# Patient Record
Sex: FEMALE | Race: WHITE | NOT HISPANIC OR LATINO | Employment: OTHER | URBAN - METROPOLITAN AREA
[De-identification: names, ages, dates, MRNs, and addresses within clinical notes are randomized per-mention and may not be internally consistent; named-entity substitution may affect disease eponyms.]

---

## 2018-06-21 ENCOUNTER — OFFICE VISIT CONVERTED (OUTPATIENT)
Dept: FAMILY MEDICINE CLINIC | Facility: CLINIC | Age: 83
End: 2018-06-21
Attending: NURSE PRACTITIONER

## 2018-07-09 ENCOUNTER — CONVERSION ENCOUNTER (OUTPATIENT)
Dept: OTHER | Facility: HOSPITAL | Age: 83
End: 2018-07-09

## 2018-10-23 ENCOUNTER — OFFICE VISIT CONVERTED (OUTPATIENT)
Dept: FAMILY MEDICINE CLINIC | Facility: CLINIC | Age: 83
End: 2018-10-23
Attending: NURSE PRACTITIONER

## 2018-12-13 ENCOUNTER — OFFICE VISIT CONVERTED (OUTPATIENT)
Dept: FAMILY MEDICINE CLINIC | Facility: CLINIC | Age: 83
End: 2018-12-13
Attending: NURSE PRACTITIONER

## 2019-07-02 ENCOUNTER — CONVERSION ENCOUNTER (OUTPATIENT)
Dept: FAMILY MEDICINE CLINIC | Facility: CLINIC | Age: 84
End: 2019-07-02

## 2019-07-02 ENCOUNTER — OFFICE VISIT CONVERTED (OUTPATIENT)
Dept: FAMILY MEDICINE CLINIC | Facility: CLINIC | Age: 84
End: 2019-07-02
Attending: NURSE PRACTITIONER

## 2019-07-09 ENCOUNTER — OFFICE VISIT CONVERTED (OUTPATIENT)
Dept: ORTHOPEDIC SURGERY | Facility: CLINIC | Age: 84
End: 2019-07-09
Attending: ORTHOPAEDIC SURGERY

## 2019-07-18 ENCOUNTER — HOSPITAL ENCOUNTER (OUTPATIENT)
Dept: OTHER | Facility: HOSPITAL | Age: 84
Discharge: HOME | End: 2019-07-18
Attending: NURSE PRACTITIONER

## 2019-07-18 LAB
ALBUMIN SERPL-MCNC: 3.7 G/DL (ref 3.5–5)
ALBUMIN/GLOB SERPL: 1.1 {RATIO} (ref 1.4–2.6)
ALP SERPL-CCNC: 177 U/L (ref 43–160)
ALT SERPL-CCNC: 17 U/L (ref 10–40)
ANION GAP SERPL CALC-SCNC: 16 MMOL/L (ref 8–19)
AST SERPL-CCNC: 24 U/L (ref 15–50)
BILIRUB SERPL-MCNC: 0.39 MG/DL (ref 0.2–1.3)
BUN SERPL-MCNC: 16 MG/DL (ref 5–25)
BUN/CREAT SERPL: 23 {RATIO} (ref 6–20)
CALCIUM SERPL-MCNC: 9.2 MG/DL (ref 8.7–10.4)
CHLORIDE SERPL-SCNC: 106 MMOL/L (ref 99–111)
CONV CO2: 30 MMOL/L (ref 22–32)
CONV TOTAL PROTEIN: 7.2 G/DL (ref 6.3–8.2)
CREAT UR-MCNC: 0.69 MG/DL (ref 0.5–0.9)
GFR SERPLBLD BASED ON 1.73 SQ M-ARVRAT: >60 ML/MIN/{1.73_M2}
GLOBULIN UR ELPH-MCNC: 3.5 G/DL (ref 2–3.5)
GLUCOSE SERPL-MCNC: 105 MG/DL (ref 65–99)
OSMOLALITY SERPL CALC.SUM OF ELEC: 310 MOSM/KG (ref 273–304)
POTASSIUM SERPL-SCNC: 3.4 MMOL/L (ref 3.5–5.3)
SODIUM SERPL-SCNC: 149 MMOL/L (ref 135–147)

## 2019-07-24 ENCOUNTER — HOSPITAL ENCOUNTER (OUTPATIENT)
Dept: FAMILY MEDICINE CLINIC | Facility: CLINIC | Age: 84
Discharge: HOME | End: 2019-07-24
Attending: NURSE PRACTITIONER

## 2019-07-24 LAB
ALBUMIN SERPL-MCNC: 3.9 G/DL (ref 3.5–5)
ALBUMIN/GLOB SERPL: 1.1 {RATIO} (ref 1.4–2.6)
ALP SERPL-CCNC: 164 U/L (ref 43–160)
ALT SERPL-CCNC: 18 U/L (ref 10–40)
ANION GAP SERPL CALC-SCNC: 15 MMOL/L (ref 8–19)
AST SERPL-CCNC: 27 U/L (ref 15–50)
BILIRUB SERPL-MCNC: 0.27 MG/DL (ref 0.2–1.3)
BUN SERPL-MCNC: 15 MG/DL (ref 5–25)
BUN/CREAT SERPL: 22 {RATIO} (ref 6–20)
CALCIUM SERPL-MCNC: 9.4 MG/DL (ref 8.7–10.4)
CHLORIDE SERPL-SCNC: 102 MMOL/L (ref 99–111)
CONV CO2: 31 MMOL/L (ref 22–32)
CONV TOTAL PROTEIN: 7.3 G/DL (ref 6.3–8.2)
CREAT UR-MCNC: 0.69 MG/DL (ref 0.5–0.9)
GFR SERPLBLD BASED ON 1.73 SQ M-ARVRAT: >60 ML/MIN/{1.73_M2}
GLOBULIN UR ELPH-MCNC: 3.4 G/DL (ref 2–3.5)
GLUCOSE SERPL-MCNC: 133 MG/DL (ref 65–99)
OSMOLALITY SERPL CALC.SUM OF ELEC: 301 MOSM/KG (ref 273–304)
POTASSIUM SERPL-SCNC: 3.6 MMOL/L (ref 3.5–5.3)
SODIUM SERPL-SCNC: 144 MMOL/L (ref 135–147)

## 2019-08-06 ENCOUNTER — HOSPITAL ENCOUNTER (OUTPATIENT)
Dept: OTHER | Facility: HOSPITAL | Age: 84
Discharge: HOME | End: 2019-08-06
Attending: FAMILY MEDICINE

## 2019-08-06 LAB
APPEARANCE UR: ABNORMAL
BILIRUB UR QL: NEGATIVE
CASTS URNS QL MICRO: ABNORMAL /[LPF]
COLOR UR: YELLOW
CONV BACTERIA: ABNORMAL
CONV COLLECTION SOURCE (UA): ABNORMAL
CONV CRYSTALS: ABNORMAL /[HPF]
CONV HYALINE CASTS IN URINE MICRO: ABNORMAL /[LPF]
CONV UROBILINOGEN IN URINE BY AUTOMATED TEST STRIP: 0.2 {EHRLICHU}/DL (ref 0.1–1)
GLUCOSE UR QL: NEGATIVE MG/DL
HGB UR QL STRIP: ABNORMAL
KETONES UR QL STRIP: NEGATIVE MG/DL
LEUKOCYTE ESTERASE UR QL STRIP: ABNORMAL
NITRITE UR QL STRIP: NEGATIVE
PH UR STRIP.AUTO: 5.5 [PH] (ref 5–8)
PROT UR QL: ABNORMAL MG/DL
RBC #/AREA URNS HPF: ABNORMAL /[HPF]
SP GR UR: 1.02 (ref 1–1.03)
SQUAMOUS SPT QL MICRO: ABNORMAL /[HPF]
WBC #/AREA URNS HPF: ABNORMAL /[HPF]

## 2019-08-08 LAB
AMOXICILLIN+CLAV SUSC ISLT: <=2
AMPICILLIN SUSC ISLT: 4
AMPICILLIN+SULBAC SUSC ISLT: <=2
BACTERIA UR CULT: ABNORMAL
CEFAZOLIN SUSC ISLT: <=4
CEFEPIME SUSC ISLT: <=1
CEFTAZIDIME SUSC ISLT: <=1
CEFTRIAXONE SUSC ISLT: <=1
CEFUROXIME ORAL SUSC ISLT: 4
CEFUROXIME PARENTER SUSC ISLT: 4
CIPROFLOXACIN SUSC ISLT: <=0.25
ERTAPENEM SUSC ISLT: <=0.5
GENTAMICIN SUSC ISLT: <=1
LEVOFLOXACIN SUSC ISLT: <=0.12
NITROFURANTOIN SUSC ISLT: <=16
TETRACYCLINE SUSC ISLT: <=1
TMP SMX SUSC ISLT: <=20
TOBRAMYCIN SUSC ISLT: <=1

## 2019-08-26 ENCOUNTER — HOSPITAL ENCOUNTER (OUTPATIENT)
Dept: FAMILY MEDICINE CLINIC | Facility: CLINIC | Age: 84
Discharge: HOME | End: 2019-08-26
Attending: NURSE PRACTITIONER

## 2019-08-26 ENCOUNTER — OFFICE VISIT CONVERTED (OUTPATIENT)
Dept: FAMILY MEDICINE CLINIC | Facility: CLINIC | Age: 84
End: 2019-08-26
Attending: NURSE PRACTITIONER

## 2019-08-28 LAB
AMOXICILLIN+CLAV SUSC ISLT: 4
AMPICILLIN SUSC ISLT: 8
AMPICILLIN+SULBAC SUSC ISLT: 4
BACTERIA UR CULT: ABNORMAL
CEFAZOLIN SUSC ISLT: >=64
CEFEPIME SUSC ISLT: <=1
CEFTAZIDIME SUSC ISLT: <=1
CEFTRIAXONE SUSC ISLT: <=1
CEFUROXIME ORAL SUSC ISLT: 4
CEFUROXIME PARENTER SUSC ISLT: 4
CIPROFLOXACIN SUSC ISLT: <=0.25
ERTAPENEM SUSC ISLT: <=0.5
GENTAMICIN SUSC ISLT: <=1
LEVOFLOXACIN SUSC ISLT: 0.5
NITROFURANTOIN SUSC ISLT: 32
TETRACYCLINE SUSC ISLT: >=16
TMP SMX SUSC ISLT: <=20
TOBRAMYCIN SUSC ISLT: <=1

## 2019-09-16 ENCOUNTER — HOSPITAL ENCOUNTER (OUTPATIENT)
Dept: FAMILY MEDICINE CLINIC | Facility: CLINIC | Age: 84
Discharge: HOME | End: 2019-09-16
Attending: NURSE PRACTITIONER

## 2019-09-18 LAB — BACTERIA UR CULT: NORMAL

## 2019-10-11 ENCOUNTER — HOSPITAL ENCOUNTER (OUTPATIENT)
Dept: FAMILY MEDICINE CLINIC | Facility: CLINIC | Age: 84
Discharge: HOME | End: 2019-10-11
Attending: NURSE PRACTITIONER

## 2019-10-13 LAB
AMOXICILLIN+CLAV SUSC ISLT: 4
AMOXICILLIN+CLAV SUSC ISLT: 8
AMPICILLIN SUSC ISLT: 16
AMPICILLIN+SULBAC SUSC ISLT: 8
BACTERIA UR CULT: ABNORMAL
CEFAZOLIN SUSC ISLT: <=4
CEFAZOLIN SUSC ISLT: >=64
CEFEPIME SUSC ISLT: <=1
CEFEPIME SUSC ISLT: <=1
CEFTAZIDIME SUSC ISLT: <=1
CEFTAZIDIME SUSC ISLT: <=1
CEFTRIAXONE SUSC ISLT: <=1
CEFTRIAXONE SUSC ISLT: <=1
CEFUROXIME ORAL SUSC ISLT: 2
CEFUROXIME ORAL SUSC ISLT: 4
CEFUROXIME PARENTER SUSC ISLT: 2
CEFUROXIME PARENTER SUSC ISLT: 4
CIPROFLOXACIN SUSC ISLT: <=0.25
CIPROFLOXACIN SUSC ISLT: <=0.25
ERTAPENEM SUSC ISLT: <=0.5
ERTAPENEM SUSC ISLT: <=0.5
GENTAMICIN SUSC ISLT: <=1
GENTAMICIN SUSC ISLT: <=1
LEVOFLOXACIN SUSC ISLT: <=0.12
LEVOFLOXACIN SUSC ISLT: <=0.12
NITROFURANTOIN SUSC ISLT: 32
NITROFURANTOIN SUSC ISLT: 32
TETRACYCLINE SUSC ISLT: <=1
TETRACYCLINE SUSC ISLT: <=1
TMP SMX SUSC ISLT: <=20
TMP SMX SUSC ISLT: <=20
TOBRAMYCIN SUSC ISLT: <=1
TOBRAMYCIN SUSC ISLT: <=1

## 2019-10-25 ENCOUNTER — OFFICE VISIT CONVERTED (OUTPATIENT)
Dept: ORTHOPEDIC SURGERY | Facility: CLINIC | Age: 84
End: 2019-10-25
Attending: ORTHOPAEDIC SURGERY

## 2019-12-05 ENCOUNTER — HOSPITAL ENCOUNTER (OUTPATIENT)
Dept: OTHER | Facility: HOSPITAL | Age: 84
Discharge: HOME | End: 2019-12-05
Attending: ORTHOPAEDIC SURGERY

## 2019-12-05 LAB
APPEARANCE UR: ABNORMAL
BILIRUB UR QL: NEGATIVE
CASTS URNS QL MICRO: ABNORMAL /[LPF]
COLOR UR: YELLOW
CONV BACTERIA: NEGATIVE
CONV COLLECTION SOURCE (UA): ABNORMAL
CONV CRYSTALS: ABNORMAL /[HPF]
CONV UROBILINOGEN IN URINE BY AUTOMATED TEST STRIP: 1 {EHRLICHU}/DL (ref 0.1–1)
GLUCOSE UR QL: NEGATIVE MG/DL
HGB UR QL STRIP: ABNORMAL
KETONES UR QL STRIP: NEGATIVE MG/DL
LEUKOCYTE ESTERASE UR QL STRIP: ABNORMAL
NITRITE UR QL STRIP: NEGATIVE
PH UR STRIP.AUTO: 5.5 [PH] (ref 5–8)
PROT UR QL: 30 MG/DL
RBC #/AREA URNS HPF: ABNORMAL /[HPF]
SP GR UR: 1.03 (ref 1–1.03)
SQUAMOUS SPT QL MICRO: ABNORMAL /[HPF]
WBC #/AREA URNS HPF: ABNORMAL /[HPF]

## 2019-12-07 LAB — BACTERIA UR CULT: NORMAL

## 2020-01-02 ENCOUNTER — HOSPITAL ENCOUNTER (OUTPATIENT)
Dept: OTHER | Facility: HOSPITAL | Age: 85
Discharge: HOME | End: 2020-01-02
Attending: FAMILY MEDICINE

## 2020-01-02 LAB
APPEARANCE UR: ABNORMAL
BILIRUB UR QL: NEGATIVE
CASTS URNS QL MICRO: ABNORMAL /[LPF]
COLOR UR: YELLOW
CONV BACTERIA: NEGATIVE
CONV COLLECTION SOURCE (UA): ABNORMAL
CONV CRYSTALS: ABNORMAL /[HPF]
CONV UROBILINOGEN IN URINE BY AUTOMATED TEST STRIP: 0.2 {EHRLICHU}/DL (ref 0.1–1)
GLUCOSE UR QL: NEGATIVE MG/DL
HGB UR QL STRIP: ABNORMAL
KETONES UR QL STRIP: NEGATIVE MG/DL
LEUKOCYTE ESTERASE UR QL STRIP: ABNORMAL
NITRITE UR QL STRIP: NEGATIVE
PH UR STRIP.AUTO: 5 [PH] (ref 5–8)
PROT UR QL: ABNORMAL MG/DL
RBC #/AREA URNS HPF: ABNORMAL /[HPF]
SP GR UR: 1.02 (ref 1–1.03)
SQUAMOUS SPT QL MICRO: ABNORMAL /[HPF]
WBC #/AREA URNS HPF: ABNORMAL /[HPF]

## 2020-01-04 LAB — BACTERIA UR CULT: NORMAL

## 2020-01-06 ENCOUNTER — HOSPITAL ENCOUNTER (OUTPATIENT)
Dept: FAMILY MEDICINE CLINIC | Facility: CLINIC | Age: 85
Discharge: HOME | End: 2020-01-06
Attending: NURSE PRACTITIONER

## 2020-01-06 LAB
ALBUMIN SERPL-MCNC: 4.2 G/DL (ref 3.5–5)
ALBUMIN/GLOB SERPL: 1.3 {RATIO} (ref 1.4–2.6)
ALP SERPL-CCNC: 100 U/L (ref 43–160)
ALT SERPL-CCNC: 13 U/L (ref 10–40)
ANION GAP SERPL CALC-SCNC: 18 MMOL/L (ref 8–19)
AST SERPL-CCNC: 24 U/L (ref 15–50)
BASOPHILS # BLD AUTO: 0.02 10*3/UL (ref 0–0.2)
BASOPHILS NFR BLD AUTO: 0.4 % (ref 0–3)
BILIRUB SERPL-MCNC: 0.42 MG/DL (ref 0.2–1.3)
BUN SERPL-MCNC: 18 MG/DL (ref 5–25)
BUN/CREAT SERPL: 22 {RATIO} (ref 6–20)
CALCIUM SERPL-MCNC: 9.2 MG/DL (ref 8.7–10.4)
CHLORIDE SERPL-SCNC: 103 MMOL/L (ref 99–111)
CHOLEST SERPL-MCNC: 197 MG/DL (ref 107–200)
CHOLEST/HDLC SERPL: 3.9 {RATIO} (ref 3–6)
CONV ABS IMM GRAN: 0.02 10*3/UL (ref 0–0.2)
CONV CO2: 27 MMOL/L (ref 22–32)
CONV IMMATURE GRAN: 0.4 % (ref 0–1.8)
CONV TOTAL PROTEIN: 7.4 G/DL (ref 6.3–8.2)
CREAT UR-MCNC: 0.82 MG/DL (ref 0.5–0.9)
DEPRECATED RDW RBC AUTO: 49.2 FL (ref 36.4–46.3)
EOSINOPHIL # BLD AUTO: 0.14 10*3/UL (ref 0–0.7)
EOSINOPHIL # BLD AUTO: 2.5 % (ref 0–7)
ERYTHROCYTE [DISTWIDTH] IN BLOOD BY AUTOMATED COUNT: 14.1 % (ref 11.7–14.4)
GFR SERPLBLD BASED ON 1.73 SQ M-ARVRAT: >60 ML/MIN/{1.73_M2}
GLOBULIN UR ELPH-MCNC: 3.2 G/DL (ref 2–3.5)
GLUCOSE SERPL-MCNC: 96 MG/DL (ref 65–99)
HCT VFR BLD AUTO: 39.4 % (ref 37–47)
HDLC SERPL-MCNC: 51 MG/DL (ref 40–60)
HGB BLD-MCNC: 12.5 G/DL (ref 12–16)
LDLC SERPL CALC-MCNC: 129 MG/DL (ref 70–100)
LYMPHOCYTES # BLD AUTO: 1.22 10*3/UL (ref 1–5)
LYMPHOCYTES NFR BLD AUTO: 21.7 % (ref 20–45)
MCH RBC QN AUTO: 30.2 PG (ref 27–31)
MCHC RBC AUTO-ENTMCNC: 31.7 G/DL (ref 33–37)
MCV RBC AUTO: 95.2 FL (ref 81–99)
MONOCYTES # BLD AUTO: 0.57 10*3/UL (ref 0.2–1.2)
MONOCYTES NFR BLD AUTO: 10.1 % (ref 3–10)
NEUTROPHILS # BLD AUTO: 3.66 10*3/UL (ref 2–8)
NEUTROPHILS NFR BLD AUTO: 64.9 % (ref 30–85)
NRBC CBCN: 0 % (ref 0–0.7)
OSMOLALITY SERPL CALC.SUM OF ELEC: 300 MOSM/KG (ref 273–304)
PLATELET # BLD AUTO: 154 10*3/UL (ref 130–400)
PMV BLD AUTO: 10.9 FL (ref 9.4–12.3)
POTASSIUM SERPL-SCNC: 4 MMOL/L (ref 3.5–5.3)
RBC # BLD AUTO: 4.14 10*6/UL (ref 4.2–5.4)
SODIUM SERPL-SCNC: 144 MMOL/L (ref 135–147)
TRIGL SERPL-MCNC: 87 MG/DL (ref 40–150)
VLDLC SERPL-MCNC: 17 MG/DL (ref 5–37)
WBC # BLD AUTO: 5.63 10*3/UL (ref 4.8–10.8)

## 2020-01-13 ENCOUNTER — OFFICE VISIT CONVERTED (OUTPATIENT)
Dept: FAMILY MEDICINE CLINIC | Facility: CLINIC | Age: 85
End: 2020-01-13
Attending: NURSE PRACTITIONER

## 2020-03-25 ENCOUNTER — PATIENT OUTREACH - CONVERTED (OUTPATIENT)
Dept: FAMILY MEDICINE CLINIC | Facility: CLINIC | Age: 85
End: 2020-03-25
Attending: NURSE PRACTITIONER

## 2020-04-27 ENCOUNTER — HOSPITAL ENCOUNTER (OUTPATIENT)
Dept: FAMILY MEDICINE CLINIC | Facility: CLINIC | Age: 85
Discharge: HOME | End: 2020-04-27
Attending: NURSE PRACTITIONER

## 2020-04-29 ENCOUNTER — PATIENT OUTREACH - CONVERTED (OUTPATIENT)
Dept: FAMILY MEDICINE CLINIC | Facility: CLINIC | Age: 85
End: 2020-04-29
Attending: NURSE PRACTITIONER

## 2020-04-30 LAB
AMOXICILLIN+CLAV SUSC ISLT: <=2
AMPICILLIN SUSC ISLT: 16
AMPICILLIN SUSC ISLT: <=2
AMPICILLIN+SULBAC SUSC ISLT: <=2
BACTERIA UR CULT: ABNORMAL
CEFAZOLIN SUSC ISLT: <=4
CEFEPIME SUSC ISLT: <=1
CEFTAZIDIME SUSC ISLT: <=1
CEFTRIAXONE SUSC ISLT: <=1
CEFUROXIME ORAL SUSC ISLT: <=1
CEFUROXIME PARENTER SUSC ISLT: <=1
CIPROFLOXACIN SUSC ISLT: <=0.25
CIPROFLOXACIN SUSC ISLT: <=0.5
CONV GENTAMICIN HIGH LEVEL SYNERGY: ABNORMAL
CONV STREPTOMYCIN HIGH LEVEL SYNERGY: ABNORMAL
DOXYCYCLINE SUSC ISLT: <=0.5
ERTAPENEM SUSC ISLT: <=0.5
ERYTHROMYCIN SUSC ISLT: <=0.25
GENTAMICIN SUSC ISLT: <=1
LEVOFLOXACIN SUSC ISLT: 2
LEVOFLOXACIN SUSC ISLT: <=0.12
LINEZOLID SUSC ISLT: 2
NITROFURANTOIN SUSC ISLT: 64
NITROFURANTOIN SUSC ISLT: <=16
TETRACYCLINE SUSC ISLT: <=1
TETRACYCLINE SUSC ISLT: <=1
TMP SMX SUSC ISLT: <=20
TOBRAMYCIN SUSC ISLT: <=1
VANCOMYCIN SUSC ISLT: <=0.5

## 2020-06-29 ENCOUNTER — PATIENT OUTREACH - CONVERTED (OUTPATIENT)
Dept: FAMILY MEDICINE CLINIC | Facility: CLINIC | Age: 85
End: 2020-06-29
Attending: NURSE PRACTITIONER

## 2020-08-26 ENCOUNTER — PATIENT OUTREACH - CONVERTED (OUTPATIENT)
Dept: FAMILY MEDICINE CLINIC | Facility: CLINIC | Age: 85
End: 2020-08-26
Attending: NURSE PRACTITIONER

## 2020-09-01 ENCOUNTER — HOSPITAL ENCOUNTER (OUTPATIENT)
Dept: FAMILY MEDICINE CLINIC | Facility: CLINIC | Age: 85
Discharge: HOME | End: 2020-09-01
Attending: NURSE PRACTITIONER

## 2020-09-01 ENCOUNTER — CONVERSION ENCOUNTER (OUTPATIENT)
Dept: FAMILY MEDICINE CLINIC | Facility: CLINIC | Age: 85
End: 2020-09-01

## 2020-09-01 ENCOUNTER — OFFICE VISIT CONVERTED (OUTPATIENT)
Dept: FAMILY MEDICINE CLINIC | Facility: CLINIC | Age: 85
End: 2020-09-01
Attending: NURSE PRACTITIONER

## 2020-09-02 LAB
ALBUMIN SERPL-MCNC: 4.1 G/DL (ref 3.5–5)
ALBUMIN/GLOB SERPL: 1.2 {RATIO} (ref 1.4–2.6)
ALP SERPL-CCNC: 111 U/L (ref 43–160)
ALT SERPL-CCNC: 12 U/L (ref 10–40)
ANION GAP SERPL CALC-SCNC: 15 MMOL/L (ref 8–19)
AST SERPL-CCNC: 23 U/L (ref 15–50)
BASOPHILS # BLD AUTO: 0.02 10*3/UL (ref 0–0.2)
BASOPHILS NFR BLD AUTO: 0.2 % (ref 0–3)
BILIRUB SERPL-MCNC: 0.28 MG/DL (ref 0.2–1.3)
BUN SERPL-MCNC: 17 MG/DL (ref 5–25)
BUN/CREAT SERPL: 20 {RATIO} (ref 6–20)
CALCIUM SERPL-MCNC: 9.8 MG/DL (ref 8.7–10.4)
CHLORIDE SERPL-SCNC: 103 MMOL/L (ref 99–111)
CHOLEST SERPL-MCNC: 206 MG/DL (ref 107–200)
CHOLEST/HDLC SERPL: 4.2 {RATIO} (ref 3–6)
CONV ABS IMM GRAN: 0.03 10*3/UL (ref 0–0.2)
CONV CO2: 27 MMOL/L (ref 22–32)
CONV IMMATURE GRAN: 0.4 % (ref 0–1.8)
CONV TOTAL PROTEIN: 7.4 G/DL (ref 6.3–8.2)
CREAT UR-MCNC: 0.84 MG/DL (ref 0.5–0.9)
DEPRECATED RDW RBC AUTO: 47.1 FL (ref 36.4–46.3)
EOSINOPHIL # BLD AUTO: 0.32 10*3/UL (ref 0–0.7)
EOSINOPHIL # BLD AUTO: 3.7 % (ref 0–7)
ERYTHROCYTE [DISTWIDTH] IN BLOOD BY AUTOMATED COUNT: 13.7 % (ref 11.7–14.4)
GFR SERPLBLD BASED ON 1.73 SQ M-ARVRAT: >60 ML/MIN/{1.73_M2}
GLOBULIN UR ELPH-MCNC: 3.3 G/DL (ref 2–3.5)
GLUCOSE SERPL-MCNC: 78 MG/DL (ref 65–99)
HCT VFR BLD AUTO: 42.4 % (ref 37–47)
HDLC SERPL-MCNC: 49 MG/DL (ref 40–60)
HGB BLD-MCNC: 13.4 G/DL (ref 12–16)
LDLC SERPL CALC-MCNC: 121 MG/DL (ref 70–100)
LYMPHOCYTES # BLD AUTO: 2.11 10*3/UL (ref 1–5)
LYMPHOCYTES NFR BLD AUTO: 24.6 % (ref 20–45)
MCH RBC QN AUTO: 30 PG (ref 27–31)
MCHC RBC AUTO-ENTMCNC: 31.6 G/DL (ref 33–37)
MCV RBC AUTO: 95.1 FL (ref 81–99)
MONOCYTES # BLD AUTO: 0.89 10*3/UL (ref 0.2–1.2)
MONOCYTES NFR BLD AUTO: 10.4 % (ref 3–10)
NEUTROPHILS # BLD AUTO: 5.2 10*3/UL (ref 2–8)
NEUTROPHILS NFR BLD AUTO: 60.7 % (ref 30–85)
NRBC CBCN: 0 % (ref 0–0.7)
OSMOLALITY SERPL CALC.SUM OF ELEC: 292 MOSM/KG (ref 273–304)
PLATELET # BLD AUTO: 187 10*3/UL (ref 130–400)
PMV BLD AUTO: 10.2 FL (ref 9.4–12.3)
POTASSIUM SERPL-SCNC: 3.7 MMOL/L (ref 3.5–5.3)
RBC # BLD AUTO: 4.46 10*6/UL (ref 4.2–5.4)
SODIUM SERPL-SCNC: 141 MMOL/L (ref 135–147)
TRIGL SERPL-MCNC: 179 MG/DL (ref 40–150)
TSH SERPL-ACNC: 1.79 M[IU]/L (ref 0.27–4.2)
VLDLC SERPL-MCNC: 36 MG/DL (ref 5–37)
WBC # BLD AUTO: 8.57 10*3/UL (ref 4.8–10.8)

## 2020-09-03 LAB
AMPICILLIN SUSC ISLT: 4
AMPICILLIN+SULBAC SUSC ISLT: <=2
BACTERIA UR CULT: ABNORMAL
CEFAZOLIN SUSC ISLT: <=4
CEFEPIME SUSC ISLT: <=0.12
CEFTAZIDIME SUSC ISLT: <=1
CEFTRIAXONE SUSC ISLT: <=0.25
CIPROFLOXACIN SUSC ISLT: <=0.25
ERTAPENEM SUSC ISLT: <=0.12
GENTAMICIN SUSC ISLT: <=1
LEVOFLOXACIN SUSC ISLT: <=0.12
NITROFURANTOIN SUSC ISLT: <=16
PIP+TAZO SUSC ISLT: <=4
TMP SMX SUSC ISLT: <=20
TOBRAMYCIN SUSC ISLT: <=1

## 2020-09-15 ENCOUNTER — HOSPITAL ENCOUNTER (OUTPATIENT)
Dept: FAMILY MEDICINE CLINIC | Facility: CLINIC | Age: 85
Discharge: HOME | End: 2020-09-15
Attending: NURSE PRACTITIONER

## 2020-09-17 LAB
AMPICILLIN SUSC ISLT: <=2
AMPICILLIN SUSC ISLT: >=32
AMPICILLIN+SULBAC SUSC ISLT: >=32
BACTERIA UR CULT: ABNORMAL
CEFAZOLIN SUSC ISLT: <=4
CEFEPIME SUSC ISLT: <=0.12
CEFTAZIDIME SUSC ISLT: <=1
CEFTRIAXONE SUSC ISLT: <=0.25
CIPROFLOXACIN SUSC ISLT: <=0.25
CIPROFLOXACIN SUSC ISLT: >=8
CONV GENTAMICIN HIGH LEVEL SYNERGY: ABNORMAL
CONV STREPTOMYCIN HIGH LEVEL SYNERGY: ABNORMAL
DAPTOMYCIN SUSC ISLT: 4
DOXYCYCLINE SUSC ISLT: >=16
ERTAPENEM SUSC ISLT: <=0.12
ERYTHROMYCIN SUSC ISLT: >=8
GENTAMICIN SUSC ISLT: <=1
LEVOFLOXACIN SUSC ISLT: <=0.12
LEVOFLOXACIN SUSC ISLT: >=8
LINEZOLID SUSC ISLT: 2
NITROFURANTOIN SUSC ISLT: 64
NITROFURANTOIN SUSC ISLT: <=16
PIP+TAZO SUSC ISLT: <=4
TETRACYCLINE SUSC ISLT: >=16
TMP SMX SUSC ISLT: >=320
TOBRAMYCIN SUSC ISLT: <=1
VANCOMYCIN SUSC ISLT: >=32

## 2020-09-25 ENCOUNTER — HOSPITAL ENCOUNTER (OUTPATIENT)
Dept: GENERAL RADIOLOGY | Facility: HOSPITAL | Age: 85
Discharge: HOME | End: 2020-09-25
Attending: NURSE PRACTITIONER

## 2020-09-28 ENCOUNTER — PATIENT OUTREACH - CONVERTED (OUTPATIENT)
Dept: FAMILY MEDICINE CLINIC | Facility: CLINIC | Age: 85
End: 2020-09-28
Attending: NURSE PRACTITIONER

## 2020-09-28 LAB
AMPICILLIN SUSC ISLT: <=2
AMPICILLIN SUSC ISLT: >=32
AMPICILLIN+SULBAC SUSC ISLT: >=32
BACTERIA UR CULT: ABNORMAL
CEFAZOLIN SUSC ISLT: <=4
CEFEPIME SUSC ISLT: <=0.12
CEFTAZIDIME SUSC ISLT: <=1
CEFTRIAXONE SUSC ISLT: <=0.25
CIPROFLOXACIN SUSC ISLT: 2
CIPROFLOXACIN SUSC ISLT: <=0.25
CONV GENTAMICIN HIGH LEVEL SYNERGY: ABNORMAL
CONV STREPTOMYCIN HIGH LEVEL SYNERGY: ABNORMAL
DOXYCYCLINE SUSC ISLT: <=0.5
ERTAPENEM SUSC ISLT: <=0.12
ERYTHROMYCIN SUSC ISLT: >=8
GENTAMICIN SUSC ISLT: <=1
LEVOFLOXACIN SUSC ISLT: 2
LEVOFLOXACIN SUSC ISLT: <=0.12
LINEZOLID SUSC ISLT: 2
NITROFURANTOIN SUSC ISLT: 128
NITROFURANTOIN SUSC ISLT: 64
PIP+TAZO SUSC ISLT: <=4
TETRACYCLINE SUSC ISLT: <=1
TMP SMX SUSC ISLT: >=320
TOBRAMYCIN SUSC ISLT: <=1
VANCOMYCIN SUSC ISLT: <=0.5

## 2020-10-16 ENCOUNTER — HOSPITAL ENCOUNTER (OUTPATIENT)
Dept: GENERAL RADIOLOGY | Facility: HOSPITAL | Age: 85
Discharge: HOME | End: 2020-10-16
Attending: NURSE PRACTITIONER

## 2020-10-19 LAB
AMPICILLIN SUSC ISLT: <=2
BACTERIA UR CULT: ABNORMAL
CIPROFLOXACIN SUSC ISLT: >=8
CONV GENTAMICIN HIGH LEVEL SYNERGY: ABNORMAL
CONV STREPTOMYCIN HIGH LEVEL SYNERGY: ABNORMAL
DAPTOMYCIN SUSC ISLT: 4
DOXYCYCLINE SUSC ISLT: >=16
ERYTHROMYCIN SUSC ISLT: >=8
LEVOFLOXACIN SUSC ISLT: >=8
LINEZOLID SUSC ISLT: 2
NITROFURANTOIN SUSC ISLT: <=16
TETRACYCLINE SUSC ISLT: >=16
VANCOMYCIN SUSC ISLT: >=32

## 2020-10-27 ENCOUNTER — PATIENT OUTREACH - CONVERTED (OUTPATIENT)
Dept: FAMILY MEDICINE CLINIC | Facility: CLINIC | Age: 85
End: 2020-10-27
Attending: NURSE PRACTITIONER

## 2020-11-16 ENCOUNTER — TELEMEDICINE CONVERTED (OUTPATIENT)
Dept: FAMILY MEDICINE CLINIC | Facility: CLINIC | Age: 85
End: 2020-11-16
Attending: NURSE PRACTITIONER

## 2020-12-31 ENCOUNTER — PATIENT OUTREACH - CONVERTED (OUTPATIENT)
Dept: FAMILY MEDICINE CLINIC | Facility: CLINIC | Age: 85
End: 2020-12-31
Attending: NURSE PRACTITIONER

## 2021-01-22 ENCOUNTER — HOSPITAL ENCOUNTER (OUTPATIENT)
Dept: FAMILY MEDICINE CLINIC | Facility: CLINIC | Age: 86
Discharge: HOME | End: 2021-01-22
Attending: NURSE PRACTITIONER

## 2021-01-22 LAB
APPEARANCE UR: ABNORMAL
BILIRUB UR QL: NEGATIVE
COLOR UR: YELLOW
CONV BACTERIA: NEGATIVE
CONV COLLECTION SOURCE (UA): ABNORMAL
CONV UROBILINOGEN IN URINE BY AUTOMATED TEST STRIP: 0.2 {EHRLICHU}/DL (ref 0.1–1)
GLUCOSE UR QL: NEGATIVE MG/DL
HGB UR QL STRIP: ABNORMAL
KETONES UR QL STRIP: NEGATIVE MG/DL
LEUKOCYTE ESTERASE UR QL STRIP: ABNORMAL
NITRITE UR QL STRIP: NEGATIVE
PH UR STRIP.AUTO: 6 [PH] (ref 5–8)
PROT UR QL: NEGATIVE MG/DL
RBC #/AREA URNS HPF: ABNORMAL /[HPF]
SP GR UR: 1.02 (ref 1–1.03)
WBC #/AREA URNS HPF: ABNORMAL /[HPF]

## 2021-01-24 LAB — BACTERIA UR CULT: NORMAL

## 2021-01-26 ENCOUNTER — PATIENT OUTREACH - CONVERTED (OUTPATIENT)
Dept: FAMILY MEDICINE CLINIC | Facility: CLINIC | Age: 86
End: 2021-01-26
Attending: NURSE PRACTITIONER

## 2021-02-23 ENCOUNTER — TELEPHONE CONVERTED (OUTPATIENT)
Dept: FAMILY MEDICINE CLINIC | Facility: CLINIC | Age: 86
End: 2021-02-23
Attending: NURSE PRACTITIONER

## 2021-03-30 ENCOUNTER — PATIENT OUTREACH - CONVERTED (OUTPATIENT)
Dept: FAMILY MEDICINE CLINIC | Facility: CLINIC | Age: 86
End: 2021-03-30
Attending: NURSE PRACTITIONER

## 2021-05-10 NOTE — OUTREACH NOTE
"   Quick Note      Patient Name: Pati Leon   Patient ID: [id number]   Sex: Female   YOB: 1933    Primary Care Provider: [place] BECERRIL    Visit Date: January 26, 2021    Provider: JONE Fabian   Location: [facility]   Location Address: [address]  [place]  [id number]   Location Phone: [phone]          History Of Present Illness     [facility]     TaskID: [id number]    Task Subject: CCM notes January 2021    Task Comments:    Date: Jan 26 2021 11:16AM     Creator: rblair  Culture results were inconclusive because of posible contamination during collection. Task has been place to Nurse by PCP to contact patient with instructions. (Chart review 5 min )    Date: Jan 21 2021  5:10PM     Creator: juan  Placed order for urinialysis with culture per instruction of PCP and printed order for lab at [place] office, I took order up fornt and got a potty hat, clean catch urine kit and took up front for patient's daughter Hilton to  in am and I called her back to inform to try to get the urine here to office before the 10am lab  and she will do her best. (8 minutes)    Date: Jan 21 2021  5:01PM     Creator: juan  Call from patient's daughter hilton who is stating the care taker is reporting her mom just \"does not feel right\" cannot pin point anything specific and they always start with urine and wanted to know if she could bring a urine for drop off tomorrow. I sent a Siterra message on computer to PCP and she reports as long as it is a clean catch urine and to do a U/A and culture of her urine. I called Hilton back and she will come to the [place] office to  supplies and return urine here for send off as she works in [place] and I will be at this office tomorrow. I requested she get it here as early as possible. (17 minutes)             Plan  · Medications  o Medications have been Reconciled  o Transition of Care or Provider " Policy            Electronically Signed by: Ricardo Vergara MA -Author on January 26, 2021 11:32:01 AM

## 2021-05-10 NOTE — OUTREACH NOTE
Quick Note      Patient Name: Pati Leon   Patient ID: [id number]   Sex: Female   YOB: 1933    Primary Care Provider: [place] BECERRIL    Visit Date: March 30, 2021    Provider: JONE Fabian   Location: [facility]   Location Address: [address]  [place]  [id number]   Location Phone: [phone]          History Of Present Illness     CCM     TaskID: [id number]    Task Subject: CCM notes March 2021    Task Comments:    Date: Mar 16 2021 12:56PM     Creator: juan GOMEZ updated CCM plan of care for March and goals all met and plan completed for this month. Scanned in cert of achievement and will mail at end of month to her home. My card is included in case they need us in the future. PCP updated on all (10 minutes)    Date: Mar 10 2021  1:11PM     Creator: juan  I spoke to patient's daughter Yenni, she is hesitant to stop services for CCM but is aware and agrees that her mom is stable in her chronic health conditions. She is aware of when to contact the office for needs for dementia, chronit UTI s/s, or anxiety. Her HLD and HTN are well maintained at this point. Certificate of Achievement completed and will be mailed to them. Goals met and UTI s/s are well maintained as well as her anxiety and dementia with sitters at home 24/7.(5 minutes)    Date: Mar  9 2021  9:37AM     Creator: juan  Noted patient consented to program 3/10/2020 for her chronic UTI, HLD, HTN, Anxiety and Dementia. All chronic conditins are now well managed and goals for PCP and patient (per her daughter Yenni) are met. She continues to have home sitters and that has improved her anxietyand dementia. Her dialy Bactrim has maintained her UTIs and no problems with that now. She is gaining weight and PCP goals met. She is due an AWV and will discuss with PCP and daughter to stop CCM services at this time. She has a good support system and her daughter is well aware of s/s to  report to PCP. (5 minutes)    Date: Mar  1 2021  2:58PM     Creator: juan  Per chart review she had a telehealth visit with provider last month on 2/23/2021 for irritated area to belly button as she had no sitter for a few days due to snow. Otherwise she is reported stable in her chronic conditions and frequent UTIs well managed with Bactrim. Only need is for an AWV as she does not have any old documentation in chart for one and her PHQ9 was score of 2 at last visit no depression needs. (chart review 7 minutes)                   Electronically Signed by: Elen Larkin RN -Author on March 30, 2021 01:36:02 PM

## 2021-05-10 NOTE — OUTREACH NOTE
Quick Note      Patient Name: Pati Leon   Patient ID: [id number]   Sex: Female   YOB: 1933    Primary Care Provider: [place] BECERRIL    Visit Date: August 26, 2020    Provider: JONE Francis   Location: [facility]   Location Address: [address] [place] [address] [place]  [id number]   Location Phone: [phone]          History Of Present Illness     [facility]     TaskID: [id number]    Task Subject: CCM notes August 2020    Task Comments:    Date: Aug 26 2020  9:38AM     Creator: juan  Patient's daughter calls this morning to report her mom states she is much better today and she does not need to be seen today in her acute appointment. I cancelled her appointment. (2 minutes)    Date: Aug 25 2020  9:45AM     Creator: juan  (cont) will call me tomorrow for update. I informed [facility] front office staff of appointment booked and why. (11 minutes)    Date: Aug 25 2020  9:44AM     Creator: juan  Noted task from PCP sent yesterday reports Desitin cream and warm compress and she would like patient seen prior to 9/1/2020 visit and request getting her a [facility] acute appointment. I discussed with  who reports any [facility] provider can see the acute patients or same day visits. I called patient's daughter back and reinforced the task stated exactly what we had discussed earlier this morning and offered an acute visit at [facility]. Her mother prefers female provider and I explained JONE Murray is available. She wanted to see how it goes this afternoon and tonight but MAY bring her in for face to face visit tomorrow for an afternoon appointment. I went ahead and booked the only afternoon appointment with [facility] for 1:15pm 8/16/2020 and we can cancel in a.m. if she wants to. She reports it is so difficult to get her mom out and she has to have afternoon appointments due to her mental state is better than morning times. PCP updated in response to the  Nurse Navigator task. Yenni    Date: Aug 25 2020  9:33AM     Creator: juan  Noted call from Yenni, patient's daughter, this am and she reports the cream she had at home and was using in Premarin cream. I discussed to not use that as it is a hormone cream for vaginal dryness and is not really appropriate for excoriation or denuded skin from incontinence irritation. We discussed use of a zinc based diaper rash cream until I hear back from PCP like Desitin or an equivalent. She asked in Vit A&D ointment would work. I informed, not as well as it does not have the zinc base. I informed her that PCP wanted the warm compresses for the left eye. She verbalizes agreement and understanding. (9 minutes)    Date: Aug 24 2020 11:04AM     Creator: juan  Task to PCP about the need for something for a possible stye on her eye and irritation on her bottom. nurse navigator task sent. (3 minutes)    Date: Aug 24 2020 10:30AM     Creator: juan  (cont) but is at work and does not know the name of it. She thinks it was prescribed when her mom was discharged from [facility] in June last year. I also informed her we would be giving her a gas card when they come back in for her 9/1/2020 follow up for her drive up here Friday for no visit. She was appreciative of that. (22 minutes)    Date: Aug 24 2020 10:27AM     Creator: juan  I called patient's daughter at [phone] to ask asbout changing schedule and to appologize for the mess up on her mother's follow up I left message for follow up call. She returned my call back and reports she was pretty upset that they did not get seen on Fridayjbut only because she is having a few issues since discharge from nursing home. She has a raw bottom and it burns when she pees because of her skin irritation and her left lwr eye lid si swollen and red now for 3 weeks and she did Zatidor drops at nursing home but no improvement. She is not wanting to do a tele health visit and wanted her seen sooner this  week, however I explained to her that [facility] is off Wednesdays and out on vacation days on Thursday and Friday this week. So, she will keep her original follow up for 9/1 with Diamond for in-person follow up. I asked her to bring all the medications with her since we still have not gotten a discharge summary and I will send a message to Diamond about her eye and bottom. She is using a cream currently    Date: Aug 24 2020  9:55AM     Creator: juan  I spoke with provider, Diamond, front office staff, Nohelia, and , Ayanna, about getting a sooner follow up. Noted PCP is out on Thursday and Friday this week. We had a same day appointment for 8/25 at 4pm, however she needs a 30 minute slot per Diamond due to she is normally [place]'s patient and she is not really familiar with her. The only other available slot is 8/31/2020 at 11am but it would have to be telehealth appointment per PCP. I will call her daughter and discuss with her. I also got approval from  to get a service recovery gas card for them on the day they come in for her follow up. (19 minutes)    Date: Aug 24 2020  8:58AM     Creator: juan  Noted a note on my desk this morning on a printed out CCM note from this month's notes that request I call [facility] at [phone] and that patient showed up for her appointment on 8/21/2020 at 1:30pm but it was cancelled because i was informed she woul be discharged on 8/25/2020 but she got out on 8/15/2020 as originally told and per note they have NO MIKAYLA that works there and her daughter was not pleaed. I called office and spoke with Wesly who is with  and I explained my prt of the situation and that I may have gotten the nurses name wrong and she reports she followed up on it and they never thought I did not call, she was told a Healther from their office called and cancelled her appointment. She stated her daughter is ok now and they are trying to get patient in sooner. (9  minutes)    Date: Aug 17 2020  2:37PM     Creator: juan  Call to [facility] for Discharge Summary notes this morning for TCM discharge med list, was transfered from opertor to nurses station and never got answered. I called again this afternoon and per Shanique she has not discharged yet. She is not scheduled to to leave now until 8/25/2020. She is doing well and progressing well but the discharge date was moved and she was unsure why. I cancelled her IPF for 8/21/2020. (11 minutes)    Date: Aug 10 2020 12:37PM     Creator: juan  Noted per discussion with front office staff, patient is being discharged on 8/15/2020 from [facility] and she has a TCM follow up with PCP scheduled for 8/21/2020. I put in another task for a reminder to call for discharge summary ad message to Ricardo LOWRY MA Care Navigator Assistant. (4 minutes)                   Electronically Signed by: Elen Larkin RN -Author on August 26, 2020 01:04:31 PM  Electronically Co-signed by: JONE Murray -Reviewer on August 26, 2020 01:20:03 PM

## 2021-05-10 NOTE — OUTREACH NOTE
"   Quick Note      Patient Name: Pati Leon   Patient ID: [id number]   Sex: Female   YOB: 1933    Primary Care Provider: [place] BECERRIL    Visit Date: December 31, 2020    Provider: JONE Fabian   Location: [facility]   Location Address: [address]  [place]  [id number]   Location Phone: [phone]          History Of Present Illness     [facility]     TaskID: [id number]    Task Subject: CCM notes December 2020    Task Comments:    Date: Dec  7 2020  2:18PM     Creator: juan  I updated her [facility] plan of care for the month and her goals per her daughter and PCP goals. (charting 12 minutes)    Date: Dec  7 2020  1:22PM     Creator: juan  Per discussion with PCP she wants her goal to be UTI free and no weight loss. (face to face with PCP 3 minutes)    Date: Dec  7 2020  1:21PM     Creator: juan  I called Yenni to check on patient and she reports she is doing so good. The Bactrim is doing the trick and no s/s of UTI since starting on this. The Seroquel is starting to kick in after about 3 weeks and they are noticing a difference in her memory now. She is \"eating everything in sight\" and no problems with weight loss at all. She is in a very good place now and better than she has been in a long time. She still has sitters Monday through Friday. No needs at this time. ( 8 minutes)    Date: Dec  7 2020  1:17PM     Creator: juan  Chart review done, she had started Bactrim daily instead of a Urology consult and will follow up on that. PCP had started Seroquel 25mg at bedtime, refilled her Bactrim for 90 days. She was instructed to have a 3 month follow up at her November telehealth with PCP but declined to schedule at that time. She is due a follow up mid February. (7 minute chart review)             Plan  · Medications  o Medications have been Reconciled  o Transition of Care or Provider Policy            Electronically Signed by: Elen Larkin RN " -Author on December 31, 2020 03:37:16 PM

## 2021-05-10 NOTE — OUTREACH NOTE
Quick Note      Patient Name: Pati Leon   Patient ID: [id number]   Sex: Female   YOB: 1933    Primary Care Provider: [place] BECERRIL    Visit Date: September 28, 2020    Provider: JONE Fabian   Location: [facility]   Location Address: [address] [facility] [place]  [id number]   Location Phone: [phone]          History Of Present Illness  CCM Comprehensive Care Plan    This Chronic Medical Management Care Plan for Pati Leon, 87 year old /White female, has been monitored and managed, reviewed, revised, and and a new plan of care implemented for the month of September. A cumulative time of 76 minutes was spent on this patient record, including face to face with provider, electronic communication with primary care provider, and chart review.   Regarding the patient's diagnoses Age-related macular degeneration, Dementia, Essential hypertension, Hyperlipidemia, and Palpitations, the following items were adressed: medical records, medications, and changes to medical care and any changes can be found within the plan section of the note. A detailed listing of time spent for chronic care management has been scanned into the patient's electronic record. Current medications include: acetaminophen 500 mg oral tablet, aspirin 81 mg oral tablet,delayed release (DR/EC), Cipro 500 mg oral tablet, Claritin 10 mg oral tablet, cranberry 400 mg oral capsule, donepezil 5 mg oral tablet, Flonase Allergy Relief 50 mcg/actuation nasal spray,suspension, ibuprofen 200 mg oral tablet, Istalol 0.5 % ophthalmic drops, once daily, losartan 50 mg oral tablet, Lubricant Redness Reliever 0.05-1 % ophthalmic (eye) drops, Macrobid 100 mg oral capsule, metoprolol succinate 50 mg oral tablet extended release 24 hr, Travatan Z 0.004 % ophthalmic drops, and Vistaril 25 mg oral capsule and the patient is reported to be compliant with medication protocol. and  Medications are reported to be non-effective in controlling symptoms and changes have been made to the medication protocol.   The patient was monitored remotely for activity level and mood and behavior for a period of 8 minutes.   This patient's physical needs include: help taking medications as prescribed, medication education, needs assistance with ADLs, physical healthcare, and. Lives at home with her daughter who is primary care taker. She is current with OT with [facility].   Patient's mental support needs include: continued support and. Her daughter Yenni is her home caretaker and she is stable at this time on medications.   The patient's cognitive support needs include: medication, continued support, coordination of community providers, requires supervision, needs assistance with ADLs, personal care, household care, emotional care, and. Continues to live at home with her daughter Yenni who is caretaker.   The patient's psychosocial support needs include:, continued support, coordination of community providers, and. CCM team working with provider and patient's daughter for continuity of care and support.   This patient's functional needs include: health care coverage, medication education, physical healthcare, and. Continues with home health for therapy s/p rehab stay and care by her daughter.   This patient's environmental needs are N/A.           Assessment  · Chronic Care Management (CCM)     V68.89/Z02.89  · Age-related macular degeneration     362.50/H35.30  · Essential hypertension     401.9/I10  · Hyperlipidemia     272.4/E78.5  · Dementia     294.8/F03.90  · UTI (urinary tract infection)     599.0/N39.0      Plan  · Orders  o 2 - CCM:Each additional 20 mins () - V68.89/Z02.89, 272.4/E78.5, 294.8/F03.90, 599.0/N39.0 - 09/21/2020  · Instructions  o Patient's Health Care Goals: To get over UTI and be more alert and up more.  o Provider's Health Care Goals: Follow up for continued UTI  for possible need for daily antibiotics.  o Patient was provided an electronic copy of care plan  o CCM services were explained and offered and patient has accepted these services.  o Patient has given their written consent to recieve CCM services and understands that this includes the authorization of electronic communication of medical information with other treating providers.  o Patient understands that they may stop CCM services at any time and these changes will be effective at the end of the calendar month and will effectively revocate the agreement of CCM services.  o Patient understands that only one practioner can furnish and be paid for CCM services during one calendar month. Patient also understands that there may be co-payment or deductible fees in association with CCM services.  o Patient will continue with at least monthly follow-up calls with the Nurse Navigator.  · Associate Tasks  o Task ID [id number] Order Follow up: Follow-Up for Pati Leon ([id number]): [id number] Urine Culture (Clean Catch) [facility]  o Task ID [id number] CCM: CCM notes September 2020            Electronically Signed by: lEen Larkin RN -Author on September 28, 2020 02:44:46 PM

## 2021-05-10 NOTE — OUTREACH NOTE
Quick Note      Patient Name: Pati Leon   Patient ID: [id number]   Sex: Female   YOB: 1933    Primary Care Provider: [place] BECERRIL    Visit Date: April 29, 2020    Provider: JONE Fabian   Location: [facility]   Location Address: [address] [place] [address] [place]  [id number]   Location Phone: [phone]          History Of Present Illness  CCM Comprehensive Care Plan    This Chronic Medical Management Care Plan for Pati Leon, 86 year old /White female, has been monitored and managed and reviewed for the month of April. A cumulative time of 26 minutes was spent on this patient record, including phone call with patient, chart review, and Call with care giver.   Regarding the patient's diagnoses Dementia, Essential hypertension, and Medication monitoring encounter, the following items were adressed: medical records and medications and any changes can be found within the plan section of the note. A detailed listing of time spent for chronic care management has been scanned into the patient's electronic record. Current medications include: aspirin 81 mg oral tablet,delayed release (DR/EC), Claritin 10 mg oral tablet, cranberry 400 mg oral capsule, donepezil 5 mg oral tablet, ibuprofen 200 mg oral tablet, Istalol 0.5 % ophthalmic drops, once daily, losartan 25 mg oral tablet, metoprolol succinate 50 mg oral tablet extended release 24 hr, Travatan Z 0.004 % ophthalmic drops, and Vistaril 25 mg oral capsule and the patient is reported to be compliant with medication protocol. and Medications are reported to be effective.   The patient was monitored remotely for blood pressure for a period of 26 minutes.   This patient's physical needs are currently being met.   Patient's mental support needs include: Continued support. Continued support required from family and med staff to assist with patients continuing health concerns.   The patient's cognitive support  needs include: medication, increased support, requires supervision, needs assistance with ADLs, personal care, household care, health care, emotional care, supervision, caregiver, and. Due to the patients declining mental status due to dementia all aspects of the patients daily activities may need to be supervised..   The patient's psychosocial support needs are N/A,   This patient's functional needs include: needs assistance for ADLs and. Continued assistance with most ADL's required.   This patient's environmental needs are N/A.           Assessment  · Chronic Care Management (CCM)     V68.89/Z02.89  · Essential hypertension     401.9/I10  · Medication monitoring encounter     V58.83/Z51.81  · Dementia     294.8/F03.90      Plan  · Medications  o Medications have been Reconciled  o Transition of Care or Provider Policy  · Instructions  o Patient's Health Care Goals: _____________________  o Provider's Health Care Goals: ____________________  o Patient was provided an electronic copy of care plan  o CCM services were explained and offered and patient has accepted these services.  o Patient has given their written consent to recieve CCM services and understands that this includes the authorization of electronic communication of medical information with other treating providers.  o Patient understands that they may stop CCM services at any time and these changes will be effective at the end of the calendar month and will effectively revocate the agreement of CCM services.  o Patient understands that only one practioner can furnish and be paid for CCM services during one calendar month. Patient also understands that there may be co-payment or deductible fees in association with CCM services.  o Patient will continue with at least monthly follow-up calls with the Nurse Navigator.  · Associate Tasks  o Task ID [id number] CCM: CCM Note April 2020            Electronically Signed by: Ricardo Vergara MA -Author on April 29, 2020  10:41:57 AM

## 2021-05-10 NOTE — OUTREACH NOTE
Quick Note      Patient Name: Pati Leon   Patient ID: [id number]   Sex: Female   YOB: 1933    Primary Care Provider: [place] BECERRIL    Visit Date: August 26, 2020    Provider: JONE Francis   Location: On license of [facility]   Location Address: [address] [place] [address] JIMMY Aguila  [id number]   Location Phone: [phone]          History Of Present Illness  CCM Comprehensive Care Plan    This Chronic Medical Management Care Plan for Pati Leon, 87 year old /White female, has been monitored and managed, reviewed, revised, and and a new plan of care implemented for the month of August. A cumulative time of 90 minutes was spent on this patient record, including face to face with provider, phone call with other providers, electronic communication with primary care provider, chart review, and phone call with patient's daughter/POA.   Regarding the patient's diagnoses Dementia, Essential hypertension, Hyperlipidemia, Macular degeneration, and Palpitations, the following items were adressed: medical records, medications, and changes to medical care and any changes can be found within the plan section of the note. A detailed listing of time spent for chronic care management has been scanned into the patient's electronic record. Current medications include: acetaminophen 500 mg oral tablet, amlodipine 5 mg oral tablet, aspirin 81 mg oral tablet,delayed release (DR/EC), Claritin 10 mg oral tablet, cranberry 400 mg oral capsule, donepezil 5 mg oral tablet, ibuprofen 200 mg oral tablet, Istalol 0.5 % ophthalmic drops, once daily, losartan 50 mg oral tablet, metoprolol succinate 50 mg oral tablet extended release 24 hr, Travatan Z 0.004 % ophthalmic drops, and Vistaril 25 mg oral capsule and the patient is reported to be compliant with medication protocol. and Medications are reported to be effective.   The patient was monitored remotely for activity level and skin integrity  for a period of 13 minutes.   This patient's physical needs include: help taking medications as prescribed, medication education, needs assistance with ADLs, physician referral, and. Lives at home with her daughter who cares for her, she was admitted to [facility] and then sent to rehab and is now back home this month.   Patient's mental support needs include: continued support and. Continues on medications and her daughter Yenni helps care for her.   The patient's cognitive support needs include: medication, increased support, coordination of community providers, needs assistance with ADLs, personal care, household care, emotional care, and. Has come home from rehab and is being cared for by her daughter Yenni.   The patient's psychosocial support needs include:, continued support, coordination of community providers, and. Per miscommunication from nursing home staff to care navigator, the TCM follow up was not completed this month, she will have a regular follow up visit with PCP first of next month. All medications have been reviewed and updated with her daughter.   This patient's functional needs include: needs assistance for ADLs, physical healthcare, and. Living with daughter from rehab facility, per daughter all needs met at this time.   This patient's environmental needs are N/A.           Assessment  · Chronic Care Management (CCM)     V68.89/Z02.89  · Essential hypertension     401.9/I10  · Dementia     294.8/F03.90  · Depression     311/F32.9      Plan  · Orders  o 2 - CCM:Each additional 20 mins () - V68.89/Z02.89, 401.9/I10, 294.8/F03.90, 311/F32.9 - 08/24/2020  · Instructions  o Patient's Health Care Goals: Per her daughter Yenni, to be able to stay at home as long as able.  o Provider's Health Care Goals: Current PCP is off on leave and provider has not seen for follow up to make goals at this time.  o Patient was provided an electronic copy of care plan  o CCM services were explained and offered and  patient has accepted these services.  o Patient has given their written consent to recieve CCM services and understands that this includes the authorization of electronic communication of medical information with other treating providers.  o Patient understands that they may stop CCM services at any time and these changes will be effective at the end of the calendar month and will effectively revocate the agreement of CCM services.  o Patient understands that only one practioner can furnish and be paid for CCM services during one calendar month. Patient also understands that there may be co-payment or deductible fees in association with CCM services.  o Patient will continue with at least monthly follow-up calls with the Nurse Navigator.  · Associate Tasks  o Task ID [id number] CCM: CCM notes August 2020  o Task ID [id number] 'Nurse Navigator: CCM call with daughter, needs for patient            Electronically Signed by: Elen Larkin RN -Author on August 26, 2020 01:03:54 PM

## 2021-05-10 NOTE — OUTREACH NOTE
Quick Note      Patient Name: Pati Leon   Patient ID: [id number]   Sex: Female   YOB: 1933    Primary Care Provider: [place] BECERRIL    Visit Date: September 28, 2020    Provider: GOOD Fabian   Location: [facility]   Location Address: [address]  [place]  [id number]   Location Phone: [phone]          History Of Present Illness     [facility]     TaskID: [id number]    Task Subject: CCM notes September 2020    Task Comments:    Date: Sep 28 2020  2:29PM     Creator: juan  Noted urine culture from Friday in chart and notes PCP reviewed but was unsure if it was another order or not. I messaged back and spoke with Hilton who reports she is somewhat better today and still sleeping but has been up more and some grouchy, so she feels like she is getting back to herself. (8 minutes)    Date: Sep 25 2020  4:32PM     Creator: juan  Patient's daughter calls to report she is at [facility] office to get upplies to bring urine back on Monday however her mom is still sleeing a lot, weak, and not feeling good. Discussion with Good Pittman and she wanted a urine today. They office is closing due to OT so she is putting in urine culture order for her to drop off urine at [place] office before 5pm today and she sent her in Cipro for further treatment. TASK to Diamond per request and hilton aware of needs. (19 minutes)    Date: Sep 21 2020  3:45PM     Creator: juan  I spoke wot patient's daughter hilton. We updated medication list and I changed some dosagases of medication in her list. She reports the Keflex and Monostat are done. She has sitters at home for her mom now and she is much better being cleaned before bed and when she gets up. Her eye is improved becuse she is getting her drops on a scheduled basis and in rehab they were not doing her drops. She is still on Aricept and we discussed the urology consult but she states her and  "Lesa had talked about it too and she just thinks her mom does not need that. She thinks the Marobid is working well and wanted to bring another urine by the office when she is done with this antibiotic Her last does will be this Friday. I tasked back to PCP and updated MOC at front dest [facility] and [facility] that Yenni will be in to  a potty hat and urine cup to bring a urine on Monday. (13 minutes)    Date: Sep 21 2020  2:49PM     Creator: juan  Charting for new CCM plan of care and notes done. Updated provider goals at this time, will discuss with daughter her patient goals at follow up call. (12 minutes)    Date: Sep 21 2020  2:26PM     Creator: juan  (cont) of need for daily antibiotics for future. I did not see any Urology consult in her chart and will discuss with PCP. I did send second task for antibiotics back to [place] to inform that Diamond had treated with Microbid and see what she wanted to do about that vs the Cipro. (24 minutes chart revie, lab review and tasking update)    Date: Sep 21 2020  2:24PM     Creator: juan  Noted per thorough chart review, patient has urine culture form 9/1/2020 with E coli and was started on Keflex and monistat at her appointmen with Diamond on 9/1/2020. Her labs were done as well and CMP was WNL. Her lipid profile with trig elevated at 179, chol elevated at 206 and LDL sl elevted at 121. Diamond reports to re-evaluate at next follo up and she was instructed to return in 3 months. No follow up scheduled. He blood pressure was stable at 136/74, her sánchez issue she was diagnosed with \"ectopion\" of left eye and suggested referal for eye doctor but daughter refused at this time. She had another urine culture on 9/15/2020 with E coli and on 9/18/2020 sent in Macroid for 7 days and to refer to urology for continued UTIs. I also noted current task in per JONE Samano who is her PCP but was off on maternity leave until last week. On 9/21/2020 se put in task on urine " from 9/15/2020 to treat with Cipro BID for 5 days and probiotics and follow up with telehealth visit after that for possibility                   Electronically Signed by: Elen Larkin RN -Author on September 28, 2020 02:45:16 PM

## 2021-05-10 NOTE — OUTREACH NOTE
Quick Note      Patient Name: Pati Leon   Patient ID: [id number]   Sex: Female   YOB: 1933    Primary Care Provider: [place] BECERRIL    Visit Date: June 29, 2020    Provider: JONE Fabian   Location: [facility]   Location Address: [address] [place] [address] JIMMY Aguila  [id number]   Location Phone: [phone]          History Of Present Illness  CCM Comprehensive Care Plan    This Chronic Medical Management Care Plan for Pati Leon, 86 year old /White female, has been monitored and managed, reviewed, revised, and and a new plan of care implemented for the month of June. A cumulative time of 29 minutes was spent on this patient record, including chart review.   Regarding the patient's diagnoses Age-related macular degeneration, Cataract, Dementia, Essential hypertension, Hyperlipidemia, and atrial fibrillation, the following items were adressed: medical records, medications, changes to medical care, and transitions to medical care and any changes can be found within the plan section of the note. A detailed listing of time spent for chronic care management has been scanned into the patient's electronic record. Current medications include: acetaminophen 500 mg oral tablet, amlodipine 5 mg oral tablet, aspirin 81 mg oral tablet,delayed release (DR/EC), Claritin 10 mg oral tablet, cranberry 400 mg oral capsule, donepezil 5 mg oral tablet, ibuprofen 200 mg oral tablet, Istalol 0.5 % ophthalmic drops, once daily, losartan 50 mg oral tablet, metoprolol succinate 50 mg oral tablet extended release 24 hr, quetiapine 25 mg oral tablet, Travatan Z 0.004 % ophthalmic drops, and Vistaril 25 mg oral capsule and the patient is reported to be compliant with medication protocol. and Medications are reported to be non-effective in controlling symptoms and changes have been made to the medication protocol.   This patient's physical needs include: help taking medications as  prescribed, needs assistance with ADLs, and physical healthcare. Lives at home with her daughter who cares for her and helps with medications.   Patient's mental support needs include: mental health education and continued support. Patient admitted to [facility] this month and discharged to [facility] due to inability to walk and started on Seroquel for mood.   The patient's cognitive support needs include: medication, increased support, coordination of community providers, needs assistance with ADLs, personal care, household care, and emotional care. Living with daughter and admitted this month and she has been discharged to [facility] for rehab needs.   The patient's psychosocial support needs include:, continued support and coordination of community providers. Patient will have a TCM follow up once discharged from nursing home.   This patient's functional needs include: medication education, needs assistance for ADLs, and physical healthcare. Currently residing at [facility] after hospitalization at [facility] for inability to walk and mood changes.   This patient's environmental needs are N/A.           Assessment  · Chronic Care Management (CCM)     V68.89/Z02.89  · Age-related macular degeneration     362.50/H35.30  · Essential hypertension     401.9/I10  · Hyperlipidemia     272.4/E78.5  · Cataract     366.9/H26.9  · Palpitations     785.1/R00.2  · Macular degeneration     362.50/H35.30  · Dementia     294.8/F03.90  · Atrial fibrillation     427.31/I48.91      Plan  · Instructions  o Patient's Health Care Goals: Unable to address goals as she was admitted to rehab facility this month.  o Provider's Health Care Goals: Safety, medication compliance, and follow up after discharge from [facility].  o Patient was provided an electronic copy of care plan  o CCM services were explained and offered and patient has accepted these services.  o Patient has given their written consent to recieve CCM services and  understands that this includes the authorization of electronic communication of medical information with other treating providers.  o Patient understands that they may stop CCM services at any time and these changes will be effective at the end of the calendar month and will effectively revocate the agreement of CCM services.  o Patient understands that only one practioner can furnish and be paid for CCM services during one calendar month. Patient also understands that there may be co-payment or deductible fees in association with CCM services.  o Patient will continue with at least monthly follow-up calls with the Nurse Navigator.  · Associate Tasks  o Task ID [id number] CCM: CCM Notes June 2020            Electronically Signed by: Elen Larkin RN -Author on June 29, 2020 02:51:11 PM

## 2021-05-10 NOTE — OUTREACH NOTE
Quick Note      Patient Name: Pati Leon   Patient ID: [id number]   Sex: Female   YOB: 1933    Primary Care Provider: [place] BECERRIL    Visit Date: March 30, 2021    Provider: JONE Fabian   Location: [facility]   Location Address: [address] [facility] [place] Agu[place]  [id number]   Location Phone: [phone]          History Of Present Illness  CCM Comprehensive Care Plan    This Chronic Medical Management Care Plan for Pati Leon, 87 year old /White female, has been monitored and managed, reviewed, and completed for the month of March. A cumulative time of 27 minutes was spent on this patient record, including face to face with provider, chart review, and phone call with patient's daughter.   Regarding the patient's diagnoses Dementia, Essential hypertension, Hyperlipidemia, and chronic UTI, the following items were adressed: medical records and medications and any changes can be found within the plan section of the note. A detailed listing of time spent for chronic care management has been scanned into the patient's electronic record. Current medications include: acetaminophen 500 mg oral tablet, aspirin 81 mg oral tablet,delayed release (DR/EC), Bactrim 400-80 mg oral tablet, Claritin 10 mg oral tablet, cranberry 400 mg oral capsule, Culturelle 10 billion cell oral capsule, donepezil 5 mg oral tablet, Flonase Allergy Relief 50 mcg/actuation nasal spray,suspension, ibuprofen 200 mg oral tablet, Istalol 0.5 % ophthalmic drops, once daily, losartan 50 mg oral tablet, Lubricant Redness Reliever 0.05-1 % ophthalmic (eye) drops, metoprolol succinate 50 mg oral tablet extended release 24 hr, Seroquel 25 mg oral tablet, Travatan Z 0.004 % ophthalmic drops, and Vistaril 25 mg oral capsule and the patient is reported to be compliant with medication protocol. Medications are reported to be effective.   The patient was monitored remotely for  weight, activity level, and mood and behavior for a period of 3 minutes.   This patient's physical needs include: help taking medications as prescribed. Patient lives by her daughter and she has caretakers at the home.   Patient's mental support needs include: continued support. Doing well with dementia with having people at the house on a regular basis for new sitter group is doing well.   The patient's cognitive support needs include: medication, continued support, coordination of community providers, personal care, household care, emotional care, supervision, and caregiver. Patient is stable and living at her home and her daughter is caring for her and checking on her, all CCM goals met and plan of care completed this month.   The patient's psychosocial support needs include:, continued support. Continued support from her daughter and she is aware to contact the CCM team if needs arise.   This patient's functional needs are N/A.   This patient's environmental needs are N/A.           Assessment  · Chronic Care Management (CCM)     V68.89/Z02.89  · Essential hypertension     401.9/I10  · Hyperlipidemia     272.4/E78.5  · Dementia     294.8/F03.90      Plan  · Instructions  o Patient's Health Care Goals: Goals met per discussion with her daughter Yenni she is stable in her mood and behavior and no UTI issues for months now, completed CCM program and daughter is aware of when to contact office for needs.  o Provider's Health Care Goals: Goals met per discussion with her provider. Will discharge from services and Certificate of Achievement and CCM discontinuation form in chart. Will mail certificate at end of month.   o Patient was provided an electronic copy of care plan  o CCM services were explained and offered and patient has accepted these services.  o Patient has given their written consent to recieve CCM services and understands that this includes the authorization of electronic communication of medical  information with other treating providers.  o Patient understands that they may stop CCM services at any time and these changes will be effective at the end of the calendar month and will effectively revocate the agreement of CCM services.  o Patient understands that only one practioner can furnish and be paid for CCM services during one calendar month. Patient also understands that there may be co-payment or deductible fees in association with CCM services.  o Patient will continue with at least monthly follow-up calls with the Nurse Navigator.  · Associate Tasks  o Task ID [id number] CCM: CCM notes March 2021            Electronically Signed by: Elen aLrkin RN -Author on March 30, 2021 01:37:18 PM

## 2021-05-10 NOTE — OUTREACH NOTE
Quick Note      Patient Name: Pati Leon   Patient ID: [id number]   Sex: Female   YOB: 1933    Primary Care Provider: [place] BECERRIL    Visit Date: October 27, 2020    Provider: JONE Fabian   [facility]   [facility] [address] [facility]  [id number]   Location Phone: [phone]          History Of Present Illness  CCM Comprehensive Care Plan    This Chronic Medical Management Care Plan for Pati Leon, 87 year old /White female, has been monitored and managed, reviewed, revised, and and a new plan of care implemented for the month of October. A cumulative time of 74 minutes was spent on this patient record, including face to face with provider, phone call with other providers, chart review, and phone call with patient's daughter Yenni.   Regarding the patient's diagnoses Dementia, Essential hypertension, Medication monitoring encounter, and recurrent UTI, the following items were adressed: medical records, medications, and changes to medical care and any changes can be found within the plan section of the note. A detailed listing of time spent for chronic care management has been scanned into the patient's electronic record. Current medications include: acetaminophen 500 mg oral tablet, aspirin 81 mg oral tablet,delayed release (DR/EC), Bactrim 400-80 mg oral tablet, Claritin 10 mg oral tablet, cranberry 400 mg oral capsule, donepezil 5 mg oral tablet, Flonase Allergy Relief 50 mcg/actuation nasal spray,suspension, ibuprofen 200 mg oral tablet, Istalol 0.5 % ophthalmic drops, once daily, losartan 50 mg oral tablet, Lubricant Redness Reliever 0.05-1 % ophthalmic (eye) drops, metoprolol succinate 50 mg oral tablet extended release 24 hr, Travatan Z 0.004 % ophthalmic drops, and Vistaril 25 mg oral capsule and the patient is reported to be compliant with medication protocol. Medications are reported to be  non-effective in controlling symptoms and changes have been made to the medication protocol.   The patient was monitored remotely for activity level and s/s of UTI or infection for a period of 13 minutes.   This patient's physical needs include: help taking medications as prescribed, medication education, and needs assistance with ADLs. Patient lives at home with her daughter and she cares for her and manages medications.   Patient's mental support needs include: continued support and coordination of community providers. Per daughter Yenni she has morning and evening help staying with her mother.   The patient's cognitive support needs include: medication, continued support, coordination of community providers, needs assistance with ADLs, personal care, household care, and emotional care. Patient lives with her daughter and she has support with private sitters as well.   The patient's psychosocial support needs include:, need for increased support and coordination of community providers. CCM team working with daughter and PCP for continued need for urinary issues for chronic UTIs and needs for antibiotics.   This patient's functional needs are N/A.   This patient's environmental needs are N/A.           Assessment  · Chronic Care Management (CCM)     V68.89/Z02.89  · Essential hypertension     401.9/I10  · Hyperlipidemia     272.4/E78.5  · Medication monitoring encounter     V58.83/Z51.81  · Dementia     294.8/F03.90  · Recurrent UTI     599.0/N39.0      Plan  · Orders  o 2 - CCM:Each additional 20 mins () - V68.89/Z02.89, 401.9/I10, 294.8/F03.90, 599.0/N39.0 - 10/19/2020  · Instructions  o Patient's Health Care Goals: To remain infection free of UTI and do the daily antibiotics for preventative. To remain out of hospital or nursing home and at home with caregivers and family for as long as possible.  o Provider's Health Care Goals: Compliance with medications and follow up. to trial Bactrim for 2 months and if  fails for urine cultures or any more UTIs will need urology evaluation.  o Patient was provided an electronic copy of care plan  o CCM services were explained and offered and patient has accepted these services.  o Patient has given their written consent to recieve CCM services and understands that this includes the authorization of electronic communication of medical information with other treating providers.  o Patient understands that they may stop CCM services at any time and these changes will be effective at the end of the calendar month and will effectively revocate the agreement of CCM services.  o Patient understands that only one practioner can furnish and be paid for CCM services during one calendar month. Patient also understands that there may be co-payment or deductible fees in association with CCM services.  o Patient will continue with at least monthly follow-up calls with the Nurse Navigator.  · Associate Tasks  o Task ID [id number] CCM: CCM notes October 2020  o Task ID [id number] ''Prescription Refill/Request: refills requested            Electronically Signed by: Elen Larkin RN -Author on October 27, 2020 05:52:02 PM

## 2021-05-10 NOTE — OUTREACH NOTE
Quick Note      Patient Name: Pati Leon   Patient ID: [id number]   Sex: Female   YOB: 1933    Primary Care Provider: Madeline BECERRIL    Visit Date: January 26, 2021    Provider: JONE Fabian   Location: [facility]   Location Address: [address] [facility] [place]  [id number]   Location Phone: [phone]          History Of Present Illness  CCM Comprehensive Care Plan    This Chronic Medical Management Care Plan for Pati Leon, 87 year old /White female, has been monitored and managed and reviewed for the month of January. A cumulative time of 30 minutes was spent on this patient record, including phone call with patient and chart review.   Regarding the patient's diagnoses Dementia, Essential hypertension, Hyperlipidemia, and recurrent UTI, the following items were adressed: medical records and medications and any changes can be found within the plan section of the note. A detailed listing of time spent for chronic care management has been scanned into the patient's electronic record. Current medications include: acetaminophen 500 mg oral tablet, aspirin 81 mg oral tablet,delayed release (DR/EC), Bactrim 400-80 mg oral tablet, Claritin 10 mg oral tablet, cranberry 400 mg oral capsule, Culturelle 10 billion cell oral capsule, donepezil 5 mg oral tablet, Flonase Allergy Relief 50 mcg/actuation nasal spray,suspension, ibuprofen 200 mg oral tablet, Istalol 0.5 % ophthalmic drops, once daily, losartan 50 mg oral tablet, Lubricant Redness Reliever 0.05-1 % ophthalmic (eye) drops, metoprolol succinate 50 mg oral tablet extended release 24 hr, Seroquel 25 mg oral tablet, Travatan Z 0.004 % ophthalmic drops, and Vistaril 25 mg oral capsule and the patient is reported to be compliant with medication protocol. Medications are reported to be effective. Regarding these diagnoses, referrals were made to the following provider/s N/A.   The  patient was monitored remotely for blood pressure, weight, and activity level for a period of 6 minutes.   This patient's physical needs include: help taking medications as prescribed, health care coverage, medication education, and needs assistance with ADLs. Patient lives with daughter but is not independent still requires assistance has sitter..   Patient's mental support needs include: continued support and contact information. Continued use of medication with minimal improvement..   The patient's cognitive support needs include: medication, continued support, and coordination of community providers. on going treatment for continued UTI.   The patient's psychosocial support needs include:, continued support. continued support from family as well as staff to help with treatment plan..   This patient's functional needs are N/A.   This patient's environmental needs are N/A.           Assessment  · Chronic Care Management (CCM)     V68.89/Z02.89  · Essential hypertension     401.9/I10  · Hyperlipidemia     272.4/E78.5  · Medication monitoring encounter     V58.83/Z51.81  · Dementia     294.8/F03.90      Plan  · Medications  o Medications have been Reconciled  o Transition of Care or Provider Policy  · Instructions  o Patient's Health Care Goals: eliminate UTI issue  o Provider's Health Care Goals: Continued treatment per care plan and ongoing health and wellness.   o Patient was provided an electronic copy of care plan  o CCM services were explained and offered and patient has accepted these services.  o Patient has given their written consent to recieve CCM services and understands that this includes the authorization of electronic communication of medical information with other treating providers.  o Patient understands that they may stop CCM services at any time and these changes will be effective at the end of the calendar month and will effectively revocate the agreement of CCM services.  o Patient understands  that only one practioner can furnish and be paid for CCM services during one calendar month. Patient also understands that there may be co-payment or deductible fees in association with CCM services.  o Patient will continue with at least monthly follow-up calls with the Nurse Navigator.  · Associate Tasks  o Task ID [id number] CCM: CCM notes January 2021            Electronically Signed by: Ricardo Vergara MA -Author on January 26, 2021 11:31:32 AM

## 2021-05-10 NOTE — OUTREACH NOTE
Quick Note      Patient Name: Pati Leon   Patient ID: [id number]   Sex: Female   YOB: 1933    Primary Care Provider: Madeline BECERRIL    Visit Date: October 27, 2020    Provider: JONE Fabian   Location: [facility]   Location Address: [address] [facility] [place]  [id number]   Location Phone: [phone]          History Of Present Illness     [facility]     TaskID: [id number]    Task Subject: [facility] notes October 2020    Task Comments:    Date: Oct 27 2020  5:47PM     Creator: juan  Noted patient started her Bactrim per converstaion with her daughter Yenni. We sara discussed the  probiotics. She reports giving her yougart. I told her she really needs to do a probiotic like Cuturelle, Align, etc to help prevent diarrhea from being on a daily dose of bactrim. She will get her something OTC tonight. She is requesting refills on 3 medications and task sent to PCP. She is aware of the VRE in the urine and to monitor her for S/S of infection and she reports she is doing fine for now. She is also aware the bactrim is for a 2 month trial per task from PCP. (15 minutes)    Date: Oct 20 2020 10:06AM     Creator: juan  Noted per tasking results from PCP not enough organism to treat for UTI at 20,000 but wants patient to see Urology. I called her daughter Yenni and she is still against this and wants to see if PCP will just treat with amaintenance antibiotic as they had discussed previously about 2 years ago and just recently. She does not think her mom will do well going to a specialists office, having any procedures done, and is not wanting exposure to go into an office. I sent task back to PCP. (8 minutes)    Date: Oct 19 2020  4:48PM     Creator: juan  [facility] results fax received form lab and scanned, attached to order for PCP to review. Will call her daughter back once results reviewed and plan of care obtained. I do not think she will treat as the final is  20,000 cfu/ml. (5 minutes)    Date: Oct 19 2020  4:01PM     Creator: juan  I checked for urine culture results in [place] and order si still pending. I got a call from her daughter Yenni to check status and noted per [facility] lab results it is in but not able for me to print. 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 That is in the system. Waiting for culture results to be faxed from hospital as I called micro and they are to fax over report for PCP. (9 minutes)    Date: Oct 16 2020  2:01PM     Creator: juan  Patient's daughter Yenni called this am and she dropped of the urine. She felt like it looked better today. I did inform it will be sent for culture only and no U/A or dip per PCP. She verbalizes understanding. (4 minutes)    Date: Oct 15 2020  4:45PM     Creator: juan  Orders placed for urine culture and order printed for [facility] lab staff. I sent task to PCP about Losartan need to PCP. She requested to send to [facility] to send over for 30 days. Task changed to YOVANNY Garner. (6 minutes)    Date: Oct 15 2020  4:38PM     Creator: juan  Call from Yenni who reports patient is doing ok, still kind of sluggish still. She is going by [facility] office to get the specimine cup for her repeat urine and I told her she could take urine sample there or I will be at [facility] office tomorrow and they have a lab as well like last time. She will bring by here. I discussed with PCP and she only wants a send out culture, no u/a or dip. I also was told per her daughter she needs the Losartan for B/P sent to \[facility]\"" as the 50mg, take 1/2 tab or 25 mg daily and they do not have that on file, only have the 100mg, take 1/2 tab daily from nursing home there. I will d/w PCP. (11 minutes)    Date: Oct  5 2020  1:25PM     Creator: juan Correa came to office to discuss urine culture. She reports there was not  really a lot of organisms for antibiotic treatment with last culture and she would like to wait a full 2 weeks OFF antibiotics to repeat a culture this time. I called Yenni back to inform and she is aware she can  supplies anytime but PCP does not want another urine until 10/16/2020. She verbalizes understanding but is not 100% in agreement. She is aware of s/s of UTI and knows to contact office if any issues arrise betwen now and then. (6 minutes)    Date: Oct  5 2020  1:21PM     Creator: juan  Called to check on patient. Noted per Yenni, she is doing well. she had a bad day yesterday but doing fine today so far. She would like another urine done as her mom is off her CIpro as of Friday 10/2/2020. I informed her I need to discuss with PCP and that I would get another set of cup and potty hat to place up front for her to . I  updated task from Madeline about urine culture. (10 minutes)                   Electronically Signed by: Elen Larkin RN -Author on October 27, 2020 05:52:41 PM

## 2021-05-10 NOTE — OUTREACH NOTE
Quick Note      Patient Name: Pati Leon   Patient ID: [id number]   Sex: Female   YOB: 1933    Primary Care Provider: Madeline BECERRIL    Visit Date: December 31, 2020    Provider: JONE Fabian   Location: [facility]   Location Address: [address] [facility] [place]  [id number]   Location Phone: [phone]          History Of Present Illness  CCM Comprehensive Care Plan    This Chronic Medical Management Care Plan for Pati Leon, 87 year old /White female, has been monitored and managed, reviewed, revised, and and a new plan of care implemented for the month of December. A cumulative time of 30 minutes was spent on this patient record, including face to face with provider, chart review, and phone cl with patient's daughter Yenni.   Regarding the patient's diagnoses Dementia, Essential hypertension, Hyperlipidemia, and recurrent UTI, the following items were adressed: medical records and medications and any changes can be found within the plan section of the note. A detailed listing of time spent for chronic care management has been scanned into the patient's electronic record. Current medications include: acetaminophen 500 mg oral tablet, aspirin 81 mg oral tablet,delayed release (DR/EC), Bactrim 400-80 mg oral tablet, Claritin 10 mg oral tablet, cranberry 400 mg oral capsule, Culturelle 10 billion cell oral capsule, donepezil 5 mg oral tablet, Flonase Allergy Relief 50 mcg/actuation nasal spray,suspension, ibuprofen 200 mg oral tablet, Istalol 0.5 % ophthalmic drops, once daily, losartan 50 mg oral tablet, Lubricant Redness Reliever 0.05-1 % ophthalmic (eye) drops, metoprolol succinate 50 mg oral tablet extended release 24 hr, Seroquel 25 mg oral tablet, Travatan Z 0.004 % ophthalmic drops, and Vistaril 25 mg oral capsule and the patient is reported to be compliant with medication protocol. Medications are reported to be effective.    The patient was monitored remotely for weight, activity level, and mood and behavior and s/s of UTI for a period of 6 minutes.   This patient's physical needs include: help taking medications as prescribed, medication education, needs assistance with ADLs, and physical healthcare. lives in daughter's home in her own area and has sitters at home Monday through Friday to help care for her.   Patient's mental support needs include: continued support. Daughter reports improvement since starting Seroquel, now noting improvement in memory.   The patient's cognitive support needs include: medication, continued support, needs assistance with ADLs, personal care, household care, emotional care, supervision, and caregiver. Continues to live with daughter and has sitters. She is in a really good place right now per her daughter.   The patient's psychosocial support needs include:, continued support and coordination of community providers. Continues her antibiotics for preventative UTI and PCP just refilled for another 3 months.   This patient's functional needs are N/A.   This patient's environmental needs are N/A.           Assessment  · Chronic Care Management (CCM)     V68.89/Z02.89  · Essential hypertension     401.9/I10  · Hyperlipidemia     272.4/E78.5  · Dementia     294.8/F03.90  · Recurrent UTI (urinary tract infection)     599.0/N39.0      Plan  · Medications  o Medications have been Reconciled  o Transition of Care or Provider Policy  · Instructions  o Patient's Health Care Goals: Per her daughter Yenni, she is in a very good place right now, better than she has been in a while. To maintain status and continue memory improvement with Seroquel.  o Provider's Health Care Goals: To remain UTI free and eat better with no weight loss. Per discussion with her daughter she is eating very well now and no weight loss.   o Patient was provided an electronic copy of care plan  o CCM services were explained and offered and  patient has accepted these services.  o Patient has given their written consent to recieve CCM services and understands that this includes the authorization of electronic communication of medical information with other treating providers.  o Patient understands that they may stop CCM services at any time and these changes will be effective at the end of the calendar month and will effectively revocate the agreement of CCM services.  o Patient understands that only one practioner can furnish and be paid for CCM services during one calendar month. Patient also understands that there may be co-payment or deductible fees in association with CCM services.  o Patient will continue with at least monthly follow-up calls with the Nurse Navigator.  · Associate Tasks  o Task ID [id number] CCM: CCM notes December 2020            Electronically Signed by: Elen Larkin RN -Author on December 31, 2020 03:36:03 PM

## 2021-05-13 ENCOUNTER — TELEMEDICINE CONVERTED (OUTPATIENT)
Dept: FAMILY MEDICINE CLINIC | Facility: CLINIC | Age: 86
End: 2021-05-13
Attending: NURSE PRACTITIONER

## 2021-05-13 NOTE — PROGRESS NOTES
"   Progress Note      Patient Name: Pati Leon   Patient ID: [id number]   Sex: Female   YOB: 1933    Primary Care Provider: Madeline BECERRIL    Visit Date: September 1, 2020    Provider: JONE Francis   Location: [facility]   Location Address: [address]  [place]  [id number]   Location Phone: [phone]          Chief Complaint  · \"Bottom\" is burning on the outside  · Left eye irritated  · Nasal Drip       History Of Present Illness  Pati Leon is a 87 year old /White female who presents for evaluation and treatment of:      She is here with her daughter.    She has been in rehab and has been discharged and has been discharged from OT as well. Her daughter says she is doing great.   She has 3 concerns.    her left eyelid is irritated    She is having burning with urination, but her daughter thinks it is coming from her backside rather than the front    she has a \"drippy nose.\"       Past Medical History  Disease Name Date Onset Notes   Age-related macular degeneration 12/13/2018 --    Arthritis --  --    Cataract --  --    Dementia --  --    Essential hypertension 12/13/2018 --    Hemorrhoids --  --    Hip fracture, right 06/05/19 Dr Orta   Hyperlipidemia 12/13/2018 --    Macular degeneration --  --    Malignant Melanoma Of Skin --  2001   Medication monitoring encounter 12/13/2018 --    Palpitations --  --    Screening for depression 12/13/2018 --    Tricuspid Regurgitation --  --          Past Surgical History  Procedure Name Date Notes   Cesarian Section --  --    Hip fracture repair, right 06/05/19 Dr Mariano   Joint Surgery --  --    Melanoma excision 2001 --          Medication List  Name Date Started Instructions   acetaminophen 500 mg oral tablet  take 1 ( 500 mg) tablet every 6 hours while awake for pain   aspirin 81 mg oral tablet,delayed release (DR/EC)  take 1 tablet (81 mg) by oral route once daily   Claritin 10 mg " oral tablet  take 1 tablet (10 mg) by oral route once daily   cranberry 400 mg oral capsule  take 1 capsule by oral route daily   donepezil 5 mg oral tablet 2020 take 1 tablet (5 mg) by oral route once daily in the evening for 30 days   ibuprofen 200 mg oral tablet  take 2 tablets by oral route daily   Istalol 0.5 % ophthalmic drops, once daily  instill 1 drop into affected eye(s) by ophthalmic route once daily   losartan 50 mg oral tablet  take 1 tablet (50 mg) by oral route once daily for 30 days   metoprolol succinate 50 mg oral tablet extended release 24 hr  take 1 tablet (50 mg) by oral route twice daily   Travatan Z 0.004 % ophthalmic drops  instill 1 drop into affected eye(s) by ophthalmic route once daily in the evening   Vistaril 25 mg oral capsule 2020 take 1 capsule by oral route in the PM.         Allergy List  Allergen Name Date Reaction Notes   NO KNOWN DRUG ALLERGIES --  --  --          Family Medical History  Disease Name Relative/Age Notes   Stroke Mother/   Mother   Heart Disease Brother/  Father/  Sister/   Father; Sister; Brother         Reproductive History  Menstrual   Pregnancy Summary   Total Pregnancies: 1 Full Term: 0 Premature: 0   Ab Induced: 0 Ab Spontaneous: 0 Ectopics: 0   Multiples: 0 Livin         Social History  Finding Status Start/Stop Quantity Notes   Alcohol Never --/-- --  --    Alcohol Use Never --/-- --  does not drink   Homemaker --  --/-- --  --    lives alone --  --/-- --  --    Other Substance Use Never --/-- --  --    Recreational Drug Use Never --/-- --  no   Retired. --  --/-- --  --    Tobacco Never --/-- --  never smoker    --  --/-- --  --          Immunizations  NameDate Admin Mfg Trade Name Lot Number Route Inj VIS Given VIS Publication   Yzjkslmem36/13/2020 SKB Fluarix, quadrivalent, preservative free [id number]  LD 2020    Comments:    Uskdadfyz58/13/2018 SKB Fluarix, quadrivalent, preservative free [id number] [facility] 2018  "  Comments:    Nbmylnuvq36/05/2017 SKB Fluarix, quadrivalent, preservative free [id number] IM LD 10/05/2017 08/05/2015   Comments:    Djmnbqbra35/28/2016 SKB Fluarix, quadrivalent, preservative free [id number] IM LD 12/28/2016 08/05/2016   Comments:    Mywbiauyf00/09/2014 SKB Fluarix, quadrivalent, preservative free [id number] NE NE 10/09/2014 07/02/2012   Comments:    Prevnar 1310/05/2017 NE Prevnar [id number] IM RD 10/05/2017 08/05/2015   Comments:          Review of Systems  · Constitutional  o Admits  o : fatigue  o Denies  o : fever, weight loss, weight gain  · Cardiovascular  o Denies  o : lower extremity edema, claudication, chest pressure, palpitations  · Respiratory  o Denies  o : shortness of breath, wheezing, cough, hemoptysis, dyspnea on exertion  · Gastrointestinal  o Denies  o : nausea, vomiting, diarrhea, constipation, abdominal pain  · Psychiatric  o Denies  o : suicidal ideation, homicidal ideation      Vitals  Date Time BP Position Site L\R Cuff Size HR RR TEMP (F) WT  HT  BMI kg/m2 BSA m2 O2 Sat HC       01/13/2020 01:20 /62 Sitting    64 - R 20 98.6 110lbs 9oz 5'  2\" 20.22 1.48 96 %    01/13/2020 01:42 /76 Sitting               09/01/2020 02:25 /74 Sitting    79 - R 20 98.5 121lbs 16oz 5'  2\" 22.31 1.56 93 %          Physical Examination  · Constitutional  o Appearance  o : well-nourished, well developed, alert, in no acute distress  · Respiratory  o Respiratory Effort  o : breathing unlabored  o Auscultation of Lungs  o : normal breath sounds throughout  · Cardiovascular  o Heart  o :   § Auscultation of Heart  § : irregular rate and rhythm, no murmurs, gallops or rubs  § Palpation of Heart  § : normal apical impulse, no cardiac thrill present  · Neurologic  o Mental Status Examination  o :   § Orientation  § : grossly oriented to person, place and time  o Cranial Nerves  o : cranial nerves intact and symmetric throughout  · Psychiatric  o Mood and Affect  o : mood normal, affect appropriate, " denies any SI/HI     no sign of pressure sore on the coccyx, there is some stool aroudn the rectum, she is wearing a brief    she has mild ectropian of the left lower lid with some tearing           Results  · In-Office Procedures  o Lab procedure  § IOP - Urinalysis without Microscopy (Clinitek) [facility] ([id number])   § Color Ur: Yellow   § Clarity Ur: Clear   § Glucose Ur Ql Strip: Negative   § Bilirub Ur Ql Strip: Negative   § Ketones Ur Ql Strip: Negative   § Sp Gr Ur Qn: 1.015   § Hgb Ur Ql Strip: Moderate   § pH Ur-LsCnc: 5.5   § Prot Ur Ql Strip: Negative   § Urobilinogen Ur Strip-mCnc: 0.2 E.U./dL   § Nitrite Ur Ql Strip: Negative   § WBC Est Ur Ql Strip: Small       Assessment  · Generalized anxiety disorder     300.02/F41.1  · Essential hypertension     401.9/I10  · Hyperlipidemia     272.4/E78.5  · Need for influenza vaccination     V04.81/Z23  · UTI (urinary tract infection)     599.0/N39.0  · Dementia     294.20/F03.90  · Ectropion due to laxity of eyelid, left     374.10/H02.106  · Vaginitis     616.10/N76.0      Plan  · Orders  o Physical, Primary Care Panel (CBC, CMP, Lipid, TSH) [facility] ([id number], [id number], 76999, 14074) - - 09/01/2020  o ACO-39: Current medications updated and reviewed () - - 09/01/2020  o ACO-15: Pneumococcal Vaccine Administered or Previously Received (4040F) - - 09/01/2020  o ACO-14: Influenza immunization administered or previously received () - - 09/01/2020  o Urine Culture (Clean Catch) [facility] ([id number]) - - 09/01/2020  · Medications  o Keflex 500 mg oral capsule   SIG: take 1 capsule (500 mg) by oral route 4 times per day for 10 days   DISP: (40) capsules with 0 refills  Prescribed on 09/01/2020     o Monistat 7 2 % vaginal cream   SIG: insert 1 applicatorful by vaginal route once daily at bedtime for 7 days   DISP: (1) 45 gm tube/kit with 0 refills  Prescribed on 09/01/2020     o Flonase Allergy Relief 50 mcg/actuation nasal spray,suspension   SIG: inhale 2 sprays (100 mcg) in each  nostril by intranasal route once daily as needed   DISP: (1) 9.9 ml aer w/adap with 1 refills  Prescribed on 09/01/2020     o Lubricant Redness Reliever 0.05-1 % ophthalmic (eye) drops   SIG: instill 1 drop in affected eye 4 times a day as needed   DISP: (1) 15 ml drop btl with 4 refills  Prescribed on 09/01/2020     o Medications have been Reconciled  o Transition of Care or Provider Policy  · Instructions  o Advised that cheeses and other sources of dairy fats, animal fats, fast food, and the extras (candy, pasteries, pies, doughnuts and cookies) all contain LDL raising nutrients. Advised to increase fruits, vegetables, whole grains, and to monitor portion sizes.   o Take all medications as prescribed/directed.  o Patient was educated/instructed on their diagnosis, treatment and medications prior to discharge from the clinic today.  o Patient instructed to seek medical attention urgently for new or worsening symptoms.  o Call the office with any concerns or questions.  o will go ahead and treat for a uti as well as the vaginitis, I offered a surgical eval for the ectropian and she declines. Will check labs and call with results.   · Disposition  o Call or Return if symptoms worsen or persist.  o F/u appt in 3 months            Electronically Signed by: JONE Francis -Author on September 3, 2020 05:17:16 PM

## 2021-05-13 NOTE — PROGRESS NOTES
Progress Note      Patient Name: Pati Leon   Patient ID: [id number]   Sex: Female   YOB: 1933    Primary Care Provider: Madeline BECERRIL    Visit Date: November 16, 2020    Provider: JONE Fabian   Location: [facility]   Location Address: [address]  [place]  [id number]   Location Phone: [phone]          Chief Complaint     med refill       History Of Present Illness  Pati Leon is a 87 year old /White female who presents for evaluation and treatment of:   TELEHEALTH TELEPHONE VISIT  Pati Leon is a 87 year old /White female who is presenting for evaluation via telehealth telephone visit. Verbal consent obtained before beginning visit.   Provider spent 8:25-8:46 minutes with patient during telehealth visit.   The following staff were present during this visit: MIGUEL Lau LPN, daughter (Yenni), pt, KCW   Past Medical History/Overview of Patient Symptoms     She is not doing HH anymore.  Chronic UTI are doing better with the Bactrim  She is having up and downs with scratching hand and palms are raw per daughter.  She is restless more during the day but evenings she does rest.  She has care givers from 9-4 and she is doing good.  She is eating good.  She is doing good with bowels.  SHe is wearing panties and doing good and pullup at night.  She has the same people there during the day (2 different lady).  She has been scratching her hands more.    HTN:  She is taking her meds and she is doing.  LIPID:  SHe is not on chol med.  Allergies:  She is doing good with her nasal sprays.      I reviewed her Sept labs with pt.       Past Medical History  Disease Name Date Onset Notes   Age-related macular degeneration 12/13/2018 --    Arthritis --  --    Cataract --  --    Dementia --  --    Essential hypertension 12/13/2018 --    Hemorrhoids --  --    Hip fracture, right 06/05/19 Dr Orta   Hyperlipidemia 12/13/2018  --    Macular degeneration --  --    Malignant Melanoma Of Skin --  2001   Medication monitoring encounter 12/13/2018 --    Palpitations --  --    Screening for depression 12/13/2018 --    Tricuspid Regurgitation --  --          Past Surgical History  Procedure Name Date Notes   Cesarian Section --  --    Hip fracture repair, right 06/05/19 Dr Mariano   Joint Surgery --  --    Melanoma excision 2001 --          Medication List  Name Date Started Instructions   acetaminophen 500 mg oral tablet  take 1 ( 500 mg) tablet every 6 hours while awake for pain   aspirin 81 mg oral tablet,delayed release (DR/EC)  take 1 tablet (81 mg) by oral route once daily   Bactrim 400-80 mg oral tablet 11/16/2020 take 1 tablet by oral route daily for 90 days   Claritin 10 mg oral tablet  take 1 tablet (10 mg) by oral route once daily   cranberry 400 mg oral capsule  take 1 capsule by oral route daily   Culturelle 10 billion cell oral capsule  take 1 capsule by oral route daily   donepezil 5 mg oral tablet 11/16/2020 take 1 tablet (5 mg) by oral route once daily in the evening for 90 days   Flonase Allergy Relief 50 mcg/actuation nasal spray,suspension 11/16/2020 inhale 2 sprays (100 mcg) in each nostril by intranasal route once daily as needed   ibuprofen 200 mg oral tablet  take 2 tablets by oral route daily   Istalol 0.5 % ophthalmic drops, once daily  instill 1 drop into affected eye(s) by ophthalmic route once daily   losartan 50 mg oral tablet 11/16/2020 take 0.5 tablet by oral route daily for 90 days   Lubricant Redness Reliever 0.05-1 % ophthalmic (eye) drops 09/01/2020 instill 1 drop in affected eye 4 times a day as needed   metoprolol succinate 50 mg oral tablet extended release 24 hr 11/16/2020 take 1 tablet (50 mg) by oral route once daily for 90 days   Travatan Z 0.004 % ophthalmic drops  instill 1 drop into affected eye(s) by ophthalmic route once daily in the evening   Vistaril 25 mg oral capsule 11/02/2020 take 1 capsule by  oral route in the PM.         Allergy List  Allergen Name Date Reaction Notes   NO KNOWN DRUG ALLERGIES --  --  --        Allergies Reconciled  Family Medical History  Disease Name Relative/Age Notes   Stroke Mother/   Mother   Heart Disease Brother/  Father/  Sister/   Father; Sister; Brother         Reproductive History  Menstrual   Pregnancy Summary   Total Pregnancies: 1 Full Term: 0 Premature: 0   Ab Induced: 0 Ab Spontaneous: 0 Ectopics: 0   Multiples: 0 Livin         Social History  Finding Status Start/Stop Quantity Notes   Alcohol Never --/-- --  --    Alcohol Use Never --/-- --  does not drink   Homemaker --  --/-- --  --    lives alone --  --/-- --  --    Other Substance Use Never --/-- --  --    Recreational Drug Use Never --/-- --  no   Retired. --  --/-- --  --    Tobacco Never --/-- --  never smoker    --  --/-- --  --          Immunizations  NameDate Admin Mfg Trade Name Lot Number Route Inj VIS Given VIS Publication   Cmcggdhiq05/13/2020 SKB Fluarix, quadrivalent, preservative free [id number]  LD 2020    Comments:    Prevnar 131 NE Prevnar [id number] IM RD 10/05/2017 2015   Comments:          Review of Systems  · Constitutional  o Admits  o : fatigue  o Denies  o : fever, weight loss, weight gain  · Cardiovascular  o Denies  o : lower extremity edema, claudication, chest pressure, palpitations  · Respiratory  o Denies  o : shortness of breath, wheezing, cough, hemoptysis, dyspnea on exertion  · Gastrointestinal  o Denies  o : nausea, vomiting, diarrhea, constipation, abdominal pain  · Psychiatric  o Denies  o : suicidal ideation, homicidal ideation          Assessment  · Allergic rhinitis due to allergen     477.9/J30.9  · Generalized anxiety disorder     300.02/F41.1  · Essential hypertension     401.9/I10  · Hyperlipidemia     272.4/E78.5  · Chronic UTI     599.0/N39.0  · Dementia     294.20/F03.90  · Memory changes     780.93/R41.3      Plan  · Orders  o ACO-39: Current  medications updated and reviewed () - - 11/16/2020  o ACO-15: Pneumococcal Vaccine Administered or Previously Received (4040F) - - 11/16/2020  o ACO-14: Influenza immunization administered or previously received () - - 11/16/2020  o ACO - Advance Care Plan or Surrogate Decision Maker documented in EMR (1123F) - - 11/16/2020 7/2019 in chart  o Physician Telephone Evaluation, 21-30 minutes (29103) - - 11/16/2020  · Medications  o Seroquel 25 mg oral tablet   SIG: take 1 tablet by oral route once a day (at bedtime)   DISP: (30) Tablet with 2 refills  Prescribed on 11/16/2020     o Bactrim 400-80 mg oral tablet   SIG: take 1 tablet by oral route daily for 90 days   DISP: (90) Tablet with 1 refills  Refilled on 11/16/2020     o donepezil 5 mg oral tablet   SIG: take 1 tablet (5 mg) by oral route once daily in the evening for 90 days   DISP: (90) Tablet with 1 refills  Refilled on 11/16/2020     o Flonase Allergy Relief 50 mcg/actuation nasal spray,suspension   SIG: inhale 2 sprays (100 mcg) in each nostril by intranasal route once daily as needed   DISP: (3) Bottle with 1 refills  Refilled on 11/16/2020     o losartan 50 mg oral tablet   SIG: take 0.5 tablet by oral route daily for 90 days   DISP: (45) Tablet with 1 refills  Refilled on 11/16/2020     o metoprolol succinate 50 mg oral tablet extended release 24 hr   SIG: take 1 tablet (50 mg) by oral route once daily for 90 days   DISP: (90) Tablet with 1 refills  Refilled on 11/16/2020     o Vistaril 25 mg oral capsule   SIG: take 1 capsule by oral route in the PM.   DISP: (30) Capsule with 2 refills  Refilled on 11/16/2020     o Medications have been Reconciled  o Transition of Care or Provider Policy  · Instructions  o Advised that cheeses and other sources of dairy fats, animal fats, fast food, and the extras (candy, pasteries, pies, doughnuts and cookies) all contain LDL raising nutrients. Advised to increase fruits, vegetables, whole grains, and to  monitor portion sizes.   o Advised that cheeses and other sources of dairy fats, animal fats, fast food, and the extras (candy, pastries, pies, doughnuts and cookies) all contain LDL raising nutrients. Advised to increase fruits, vegetables, whole grains, and to monitor portion sizes.   o Take all medications as prescribed/directed.  o Patient was educated/instructed on their diagnosis, treatment and medications prior to discharge from the clinic today.  o Patient instructed to seek medical attention urgently for new or worsening symptoms.  o Call the office with any concerns or questions.  o We will add Seroquel at night with the Vistaril. F>U in 3 months for telemed to see how the Seroquel is doing. We may need to increase up at that time but if Yenni see's any bad s/e she will call me. Call with any concerns or questions. Declines to do 3 month appt.  · Disposition  o Call or Return if symptoms worsen or persist.  o Return Visit Request in/on 6 months +/- 2 days ([id number]).            Electronically Signed by: JONE Fabian -Author on November 16, 2020 08:48:31 AM

## 2021-05-14 VITALS
WEIGHT: 122 LBS | DIASTOLIC BLOOD PRESSURE: 74 MMHG | HEART RATE: 79 BPM | RESPIRATION RATE: 20 BRPM | TEMPERATURE: 98.5 F | SYSTOLIC BLOOD PRESSURE: 136 MMHG | OXYGEN SATURATION: 93 % | BODY MASS INDEX: 22.45 KG/M2 | HEIGHT: 62 IN

## 2021-05-14 NOTE — PROGRESS NOTES
Progress Note      Patient Name: Pati Leon   Patient ID: [id number]   Sex: Female   YOB: 1933    Primary Care Provider: Madeline BECERRIL    Visit Date: February 23, 2021    Provider: JONE Fabian   Location: [facility]   Location Address: [address] [facility] [place]  [id number]   Location Phone: [phone]          Chief Complaint     Med refills  Belly button drainage, picks at belly button       History Of Present Illness  Pati Leon is a 87 year old /White female who presents for evaluation and treatment of:   TELEHEALTH TELEPHONE VISIT  Pati Leon is a 87 year old /White female who is presenting for evaluation via telehealth telephone visit. Verbal consent obtained before beginning visit.   Provider spent 7:44-7:55 minutes with patient during telehealth visit.   The following staff were present during this visit: MIGUEL Lau LPN, daughter (Yenni), pt, KCW   Past Medical History/Overview of Patient Symptoms     She still has her sitters and last week during the snow the sitters could not come so she was picking more and she picked an area on the belly button per Yenni it is looking better already this week but asked if they could do Neosporin.  Chronic UTI are doing better with the Bactrim and doing well.  She is doing better with the itching.    HTN:  She is taking her meds and she is doing.  LIPID:  SHe is not on chol med.  Allergies:  She is doing good with her nasal sprays.      I reviewed her Sept labs with pt.       Past Medical History  Disease Name Date Onset Notes   Age-related macular degeneration 12/13/2018 --    Arthritis --  --    Cataract --  --    Dementia --  --    Essential hypertension 12/13/2018 --    Hemorrhoids --  --    Hip fracture, right 06/05/19 Dr Orta   Hyperlipidemia 12/13/2018 --    Macular degeneration --  --    Malignant Melanoma Of Skin --  2001   Medication monitoring encounter  12/13/2018 --    Palpitations --  --    Screening for depression 12/13/2018 --    Tricuspid Regurgitation --  --          Past Surgical History  Procedure Name Date Notes   Cesarian Section --  --    Hip fracture repair, right 06/05/19 Dr Mariano   Joint Surgery --  --    Melanoma excision 2001 --          Medication List  Name Date Started Instructions   acetaminophen 500 mg oral tablet  take 1 ( 500 mg) tablet every 6 hours while awake for pain   aspirin 81 mg oral tablet,delayed release (/EC)  take 1 tablet (81 mg) by oral route once daily   Bactrim 400-80 mg oral tablet 11/16/2020 take 1 tablet by oral route daily for 90 days   Claritin 10 mg oral tablet  take 1 tablet (10 mg) by oral route once daily   cranberry 400 mg oral capsule  take 1 capsule by oral route daily   Culturelle 10 billion cell oral capsule  take 1 capsule by oral route daily   donepezil 5 mg oral tablet 11/16/2020 take 1 tablet (5 mg) by oral route once daily in the evening for 90 days   Flonase Allergy Relief 50 mcg/actuation nasal spray,suspension 11/16/2020 inhale 2 sprays (100 mcg) in each nostril by intranasal route once daily as needed   ibuprofen 200 mg oral tablet  take 2 tablets by oral route daily   Istalol 0.5 % ophthalmic drops, once daily  instill 1 drop into affected eye(s) by ophthalmic route once daily   losartan 50 mg oral tablet 11/16/2020 take 0.5 tablet by oral route daily for 90 days   Lubricant Redness Reliever 0.05-1 % ophthalmic (eye) drops 09/01/2020 instill 1 drop in affected eye 4 times a day as needed   metoprolol succinate 50 mg oral tablet extended release 24 hr 11/16/2020 take 1 tablet (50 mg) by oral route once daily for 90 days   Seroquel 25 mg oral tablet 02/23/2021 take 1 tablet by oral route once a day (at bedtime) for 90 days   Travatan Z 0.004 % ophthalmic drops  instill 1 drop into affected eye(s) by ophthalmic route once daily in the evening   Vistaril 25 mg oral capsule 02/23/2021 take 1 capsule by oral  route in the PM. for 90 days         Allergy List  Allergen Name Date Reaction Notes   NO KNOWN DRUG ALLERGIES --  --  --        Allergies Reconciled  Family Medical History  Disease Name Relative/Age Notes   Stroke Mother/   Mother   Heart Disease Brother/  Father/  Sister/   Father; Sister; Brother         Reproductive History  Menstrual   Pregnancy Summary   Total Pregnancies: 1 Full Term: 0 Premature: 0   Ab Induced: 0 Ab Spontaneous: 0 Ectopics: 0   Multiples: 0 Livin         Social History  Finding Status Start/Stop Quantity Notes   Alcohol Never --/-- --  --    Alcohol Use Never --/-- --  does not drink   Homemaker --  --/-- --  --    lives alone --  --/-- --  --    Other Substance Use Never --/-- --  --    Recreational Drug Use Never --/-- --  no   Retired. --  --/-- --  --    Tobacco Never --/-- --  never smoker    --  --/-- --  --          Immunizations  NameDate Admin Mfg Trade Name Lot Number Route Inj VIS Given VIS Publication   COVID [id number] MOD Moderna COVID-19 Vaccine  NE NE 2021    Comments:    Xqrpmxrpp62/13/2020 SKB Fluarix, quadrivalent, preservative free [id number] IM LD 2020    Comments:    Prevnar 131 NE Prevnar [id number] IM RD 10/05/2017 2015   Comments:          Review of Systems  · Constitutional  o Admits  o : fatigue  o Denies  o : fever, weight loss, weight gain  · Cardiovascular  o Denies  o : lower extremity edema, claudication, chest pressure, palpitations  · Respiratory  o Denies  o : shortness of breath, wheezing, cough, hemoptysis, dyspnea on exertion  · Gastrointestinal  o Denies  o : nausea, vomiting, diarrhea, constipation, abdominal pain  · Psychiatric  o Denies  o : suicidal ideation, homicidal ideation              Assessment  · Allergic rhinitis due to allergen     477.9/J30.9  · Generalized anxiety disorder     300.02/F41.1  · Essential hypertension     401.9/I10  · Hyperlipidemia     272.4/E78.5  · Screening for  depression     V79.0/Z13.89  · Dementia     294.20/F03.90  · Memory changes     780.93/R41.3      Plan  · Orders  o ACO-18: Negative screen for clinical depression using a standardized tool () - V79.0/Z13.89 - 02/23/2021  o ACO-39: Current medications updated and reviewed () - - 02/23/2021  o ACO-15: Pneumococcal Vaccine Administered or Previously Received (4040F) - - 02/23/2021  o ACO-14: Influenza immunization administered or previously received () - - 02/23/2021  o Physician Telephone Evaluation, 11-20 minutes (89555) - - 02/23/2021  · Medications  o Seroquel 25 mg oral tablet   SIG: take 1 tablet by oral route once a day (at bedtime) for 90 days   DISP: (90) Tablet with 0 refills  Refilled on 02/23/2021     o Vistaril 25 mg oral capsule   SIG: take 1 capsule by oral route in the PM. for 90 days   DISP: (270) Capsule with 0 refills  Refilled on 02/23/2021     o Medications have been Reconciled  o Transition of Care or Provider Policy  · Instructions  o Advised that cheeses and other sources of dairy fats, animal fats, fast food, and the extras (candy, pasteries, pies, doughnuts and cookies) all contain LDL raising nutrients. Advised to increase fruits, vegetables, whole grains, and to monitor portion sizes.   o Advised that cheeses and other sources of dairy fats, animal fats, fast food, and the extras (candy, pastries, pies, doughnuts and cookies) all contain LDL raising nutrients. Advised to increase fruits, vegetables, whole grains, and to monitor portion sizes.   o Today's PHQ-9 result is _2__  o Take all medications as prescribed/directed.  o Patient was educated/instructed on their diagnosis, treatment and medications prior to discharge from the clinic today.  o Patient instructed to seek medical attention urgently for new or worsening symptoms.  o Call the office with any concerns or questions.  o We will stay the same for the meds. F.U in 3 months in office and go from there. Call with any  concerns or questions. Per daughter eYnni pt is doing well. She did not talk today on the phone but she was right beside Yenni per Yenni. She will call back to schedule the appt. We will do a covering during the night for the belly button. No streaking noted.  · Disposition  o Call or Return if symptoms worsen or persist.  o Return Visit Request in/on 3 months +/- 2 days ([id number]).            Electronically Signed by: JONE Fabian -Author on February 23, 2021 08:10:38 AM

## 2021-05-15 VITALS — HEART RATE: 76 BPM | HEIGHT: 62 IN | BODY MASS INDEX: 20.61 KG/M2 | OXYGEN SATURATION: 97 % | WEIGHT: 112 LBS

## 2021-05-15 VITALS
DIASTOLIC BLOOD PRESSURE: 60 MMHG | TEMPERATURE: 97.7 F | OXYGEN SATURATION: 96 % | RESPIRATION RATE: 12 BRPM | WEIGHT: 112 LBS | SYSTOLIC BLOOD PRESSURE: 120 MMHG | BODY MASS INDEX: 19.12 KG/M2 | HEIGHT: 64 IN | HEART RATE: 73 BPM

## 2021-05-15 VITALS — HEART RATE: 63 BPM | HEIGHT: 64 IN | WEIGHT: 110 LBS | BODY MASS INDEX: 18.78 KG/M2 | OXYGEN SATURATION: 97 %

## 2021-05-15 VITALS
HEIGHT: 62 IN | BODY MASS INDEX: 20.35 KG/M2 | HEART RATE: 64 BPM | TEMPERATURE: 98.6 F | WEIGHT: 110.56 LBS | RESPIRATION RATE: 20 BRPM | DIASTOLIC BLOOD PRESSURE: 62 MMHG | OXYGEN SATURATION: 96 % | SYSTOLIC BLOOD PRESSURE: 142 MMHG

## 2021-05-15 VITALS
RESPIRATION RATE: 12 BRPM | TEMPERATURE: 98.8 F | HEIGHT: 64 IN | HEART RATE: 73 BPM | BODY MASS INDEX: 18.86 KG/M2 | WEIGHT: 110.5 LBS | OXYGEN SATURATION: 97 %

## 2021-05-15 VITALS — DIASTOLIC BLOOD PRESSURE: 82 MMHG | SYSTOLIC BLOOD PRESSURE: 142 MMHG

## 2021-05-16 VITALS
OXYGEN SATURATION: 97 % | HEART RATE: 41 BPM | BODY MASS INDEX: 19.17 KG/M2 | TEMPERATURE: 97.5 F | SYSTOLIC BLOOD PRESSURE: 160 MMHG | WEIGHT: 112.25 LBS | RESPIRATION RATE: 16 BRPM | DIASTOLIC BLOOD PRESSURE: 90 MMHG | HEIGHT: 64 IN

## 2021-05-16 VITALS
WEIGHT: 108.06 LBS | OXYGEN SATURATION: 98 % | TEMPERATURE: 98.8 F | DIASTOLIC BLOOD PRESSURE: 53 MMHG | BODY MASS INDEX: 18.45 KG/M2 | RESPIRATION RATE: 16 BRPM | HEART RATE: 37 BPM | HEIGHT: 64 IN | SYSTOLIC BLOOD PRESSURE: 159 MMHG

## 2021-05-16 VITALS
OXYGEN SATURATION: 97 % | HEART RATE: 87 BPM | HEIGHT: 64 IN | TEMPERATURE: 98.4 F | DIASTOLIC BLOOD PRESSURE: 76 MMHG | SYSTOLIC BLOOD PRESSURE: 157 MMHG | BODY MASS INDEX: 18.31 KG/M2 | WEIGHT: 107.25 LBS | RESPIRATION RATE: 16 BRPM

## 2021-05-16 VITALS — DIASTOLIC BLOOD PRESSURE: 72 MMHG | SYSTOLIC BLOOD PRESSURE: 136 MMHG

## 2021-05-24 ENCOUNTER — HOSPITAL ENCOUNTER (OUTPATIENT)
Dept: FAMILY MEDICINE CLINIC | Facility: CLINIC | Age: 86
Discharge: HOME | End: 2021-05-24
Attending: NURSE PRACTITIONER

## 2021-05-24 ENCOUNTER — OFFICE VISIT CONVERTED (OUTPATIENT)
Dept: FAMILY MEDICINE CLINIC | Facility: CLINIC | Age: 86
End: 2021-05-24
Attending: NURSE PRACTITIONER

## 2021-05-24 LAB
ALBUMIN SERPL-MCNC: 3.6 G/DL (ref 3.5–5)
ALBUMIN/GLOB SERPL: 1.1 {RATIO} (ref 1.4–2.6)
ALP SERPL-CCNC: 91 U/L (ref 43–160)
ALT SERPL-CCNC: 12 U/L (ref 10–40)
ANION GAP SERPL CALC-SCNC: 14 MMOL/L (ref 8–19)
AST SERPL-CCNC: 16 U/L (ref 15–50)
BASOPHILS # BLD AUTO: 0.04 10*3/UL (ref 0–0.2)
BASOPHILS # BLD: 0 % (ref 0–3)
BASOPHILS NFR BLD AUTO: 0.3 % (ref 0–3)
BILIRUB SERPL-MCNC: 0.43 MG/DL (ref 0.2–1.3)
BUN SERPL-MCNC: 23 MG/DL (ref 5–25)
BUN/CREAT SERPL: 24 {RATIO} (ref 6–20)
CALCIUM SERPL-MCNC: 8.8 MG/DL (ref 8.7–10.4)
CHLORIDE SERPL-SCNC: 105 MMOL/L (ref 99–111)
CONV ABS BANDS: 4 % (ref 1–5)
CONV ABS IMM GRAN: 0.07 10*3/UL (ref 0–0.2)
CONV ANISOCYTES: SLIGHT
CONV ATYPICAL LYMPHOCYTES: 1 % (ref 0–5)
CONV CO2: 28 MMOL/L (ref 22–32)
CONV IMMATURE GRAN: 0.5 % (ref 0–1.8)
CONV SEGMENTED NEUTROPHILS: 43 % (ref 45–70)
CONV TOTAL PROTEIN: 7 G/DL (ref 6.3–8.2)
CREAT UR-MCNC: 0.96 MG/DL (ref 0.5–0.9)
DEPRECATED RDW RBC AUTO: 50.8 FL (ref 36.4–46.3)
EOSINOPHIL # BLD AUTO: 25 % (ref 0–7)
EOSINOPHIL # BLD AUTO: 3.2 10*3/UL (ref 0–0.7)
EOSINOPHIL NFR BLD AUTO: 25 % (ref 0–7)
ERYTHROCYTE [DISTWIDTH] IN BLOOD BY AUTOMATED COUNT: 14.5 % (ref 11.7–14.4)
GFR SERPLBLD BASED ON 1.73 SQ M-ARVRAT: 53 ML/MIN/{1.73_M2}
GLOBULIN UR ELPH-MCNC: 3.4 G/DL (ref 2–3.5)
GLUCOSE SERPL-MCNC: 88 MG/DL (ref 65–99)
HCT VFR BLD AUTO: 37.3 % (ref 37–47)
HGB BLD-MCNC: 12 G/DL (ref 12–16)
LYMPHOCYTES # BLD AUTO: 1.92 10*3/UL (ref 1–5)
LYMPHOCYTES NFR BLD AUTO: 15 % (ref 20–45)
MCH RBC QN AUTO: 30.9 PG (ref 27–31)
MCHC RBC AUTO-ENTMCNC: 32.2 G/DL (ref 33–37)
MCV RBC AUTO: 96.1 FL (ref 81–99)
METAMYELOCYTES NFR BLD MANUAL: 1 %
MONOCYTES # BLD AUTO: 1.45 10*3/UL (ref 0.2–1.2)
MONOCYTES NFR BLD AUTO: 11.3 % (ref 3–10)
MONOCYTES NFR BLD MANUAL: 15 % (ref 3–10)
NEUTROPHILS # BLD AUTO: 6.14 10*3/UL (ref 2–8)
NEUTROPHILS NFR BLD AUTO: 47.9 % (ref 30–85)
NRBC CBCN: 0 % (ref 0–0.7)
NUC CELL # PRT MANUAL: 0 /100{WBCS}
OSMOLALITY SERPL CALC.SUM OF ELEC: 299 MOSM/KG (ref 273–304)
PLAT MORPH BLD: NORMAL
PLATELET # BLD AUTO: 200 10*3/UL (ref 130–400)
PMV BLD AUTO: 10.9 FL (ref 9.4–12.3)
POTASSIUM SERPL-SCNC: 3.9 MMOL/L (ref 3.5–5.3)
RBC # BLD AUTO: 3.88 10*6/UL (ref 4.2–5.4)
SMALL PLATELETS BLD QL SMEAR: ADEQUATE
SODIUM SERPL-SCNC: 143 MMOL/L (ref 135–147)
T4 FREE SERPL-MCNC: 1.3 NG/DL (ref 0.9–1.8)
TSH SERPL-ACNC: 2.04 M[IU]/L (ref 0.27–4.2)
VARIANT LYMPHS NFR BLD MANUAL: 11 % (ref 20–45)
WBC # BLD AUTO: 12.82 10*3/UL (ref 4.8–10.8)

## 2021-05-25 LAB — IGE SERPL-ACNC: 79 K[IU]/ML (ref 0–24)

## 2021-05-26 LAB — BACTERIA SPEC AEROBE CULT: NORMAL

## 2021-06-05 NOTE — PROGRESS NOTES
Progress Note      Patient Name: Pati Leon   Patient ID: [id number]   Sex: Female   YOB: 1933    Primary Care Provider: Madeline BECERRIL    Visit Date: May 13, 2021    Provider: JONE Francis   Location: [facility]   Location Address: [address]  [place]  [id number]   Location Phone: [phone]          Chief Complaint  · rash legs, hands, hip, bottom      History Of Present Illness  Pati Leon is a 87 year old /White female who presents for evaluation and treatment of:   Video Conferencing Visit  Pati Leon is a 87 year old /White female who is presenting for evaluation via video conferencing via [employer]. Verbal consent obtained before beginning visit.   The following staff were present during this visit: gm      daughter Yenni on the call with her.     Rash for about a week and half or two weeks, starting to spread. Is on her hands, hips, now on her bottom, some are fluid filled, some have little blood blisters, no new meds, no new skin products, itching not burning. She has 2 caregivers and none say they have used anything new. She had improvement with Benadryl ointment.       Past Medical History  Disease Name Date Onset Notes   Age-related macular degeneration 12/13/2018 --    Arthritis --  --    Cataract --  --    Dementia --  --    Essential hypertension 12/13/2018 --    Hemorrhoids --  --    Hip fracture, right 06/05/19 Dr Orta   Hyperlipidemia 12/13/2018 --    Macular degeneration --  --    Malignant Melanoma Of Skin --  2001   Medication monitoring encounter 12/13/2018 --    Palpitations --  --    Screening for depression 12/13/2018 --    Tricuspid Regurgitation --  --          Past Surgical History  Procedure Name Date Notes   Cesarian Section --  --    Hip fracture repair, right 06/05/19 Dr Mariano   Joint Surgery --  --    Melanoma excision 2001 --          Medication List  Name Date Started  Instructions   acetaminophen 500 mg oral tablet  take 1 ( 500 mg) tablet every 6 hours while awake for pain   aspirin 81 mg oral tablet,delayed release (DR/EC)  take 1 tablet (81 mg) by oral route once daily   Bactrim 400-80 mg oral tablet 11/16/2020 take 1 tablet by oral route daily for 90 days   Claritin 10 mg oral tablet  take 1 tablet (10 mg) by oral route once daily   cranberry 400 mg oral capsule  take 1 capsule by oral route daily   Culturelle 10 billion cell oral capsule  take 1 capsule by oral route daily   donepezil 5 mg oral tablet 11/16/2020 take 1 tablet (5 mg) by oral route once daily in the evening for 90 days   Flonase Allergy Relief 50 mcg/actuation nasal spray,suspension 11/16/2020 inhale 2 sprays (100 mcg) in each nostril by intranasal route once daily as needed   ibuprofen 200 mg oral tablet  take 2 tablets by oral route daily   Istalol 0.5 % ophthalmic drops, once daily  instill 1 drop into affected eye(s) by ophthalmic route once daily   losartan 50 mg oral tablet 11/16/2020 take 0.5 tablet by oral route daily for 90 days   Lubricant Redness Reliever 0.05-1 % ophthalmic (eye) drops 09/01/2020 instill 1 drop in affected eye 4 times a day as needed   metoprolol succinate 50 mg oral tablet extended release 24 hr 11/16/2020 take 1 tablet (50 mg) by oral route once daily for 90 days   Seroquel 25 mg oral tablet 02/23/2021 take 1 tablet by oral route once a day (at bedtime) for 90 days   Travatan Z 0.004 % ophthalmic drops  instill 1 drop into affected eye(s) by ophthalmic route once daily in the evening   Vistaril 25 mg oral capsule 05/03/2021 take 1 capsule by oral route 2 times a day for 30 days         Allergy List  Allergen Name Date Reaction Notes   NO KNOWN DRUG ALLERGIES --  --  --        Allergies Reconciled  Family Medical History  Disease Name Relative/Age Notes   Stroke Mother/   Mother   Heart Disease Brother/  Father/  Sister/   Father; Sister; Brother         Reproductive  History  Menstrual   Pregnancy Summary   Total Pregnancies: 1 Full Term: 0 Premature: 0   Ab Induced: 0 Ab Spontaneous: 0 Ectopics: 0   Multiples: 0 Livin         Social History  Finding Status Start/Stop Quantity Notes   Alcohol Never --/-- --  --    Homemaker --  --/-- --  --    lives alone --  --/-- --  --    Other Substance Use Never --/-- --  --    Recreational Drug Use Never --/-- --  no   Retired. --  --/-- --  --    Tobacco Never --/-- --  never smoker    --  --/-- --  --          Immunizations  NameDate Admin Mfg Trade Name Lot Number Route Inj VIS Given VIS Publication   COVID [id number] MOD Moderna COVID-19 Vaccine  NE NE 2021    Comments:    COVID [id number] MOD Moderna COVID-19 Vaccine  NE NE 2021    Comments:    Gplfgkefr22/13/2020 SKB Fluarix, quadrivalent, preservative free [id number] IM LD 2020    Comments:    Prevnar 131 NE Prevnar [id number] IM RD 10/05/2017 2015   Comments:          Review of Systems  · Constitutional  o Denies  o : fever, fatigue, weight loss, weight gain  · Cardiovascular  o Denies  o : lower extremity edema chest pressure, palpitations  · Respiratory  o Denies  o : shortness of breath, wheezing, cough, dyspnea on exertion  · Gastrointestinal  o Denies  o : nausea, vomiting, diarrhea, constipation, abdominal pain  · Integument  o Admits  o : rash, itching      Physical Examination  · Constitutional  o Appearance  o : well developed, well-nourished, no acute distress  · Respiratory  o Respiratory Effort  o : breathing unlabored  · Psychiatric  o Mood and Affect  o : mood normal, affect appropriate     difficult to view rash due to quality of video and internet problems, there seemed to be some crusting of the blisters on her hand and some red raised areas on the right hip.           Assessment  · Folliculitis     704.8/L73.9  · Rash     782.1/R21  · Itching     698.9/L29.9      Plan  · Orders  o ACO-39: Current medications updated  and reviewed (1159F, ) - - 05/13/2021  · Medications  o Keflex 500 mg oral capsule   SIG: take 1 capsule by oral route 3 times a day for 10 days   DISP: (30) Capsule with 0 refills  Prescribed on 05/13/2021     o Medrol (Thanh) 4 mg oral tablets,dose pack   SIG: take by oral route as directed per package instructions   DISP: (1) Packet with 0 refills  Prescribed on 05/13/2021     o Medications have been Reconciled  o Transition of Care or Provider Policy  · Instructions  o Patient was educated/instructed on their diagnosis, treatment and medications prior to discharge from the clinic today.  o Patient instructed to seek medical attention urgently for new or worsening symptoms.  o Call the office with any concerns or questions.  o hold the Bactrim while on the Keflex. I might consider holding the Bactrim completely in case it's a delayed reaction to the abx. F/u in 10 days sooner if needed.   · Disposition  o Call or Return if symptoms worsen or persist.            Electronically Signed by: JONE Francis -Author on May 13, 2021 05:10:41 PM

## 2021-06-05 NOTE — PROGRESS NOTES
Progress Note      Patient Name: Pati Leon   Patient ID: [id number]   Sex: Female   YOB: 1933    Primary Care Provider: Madeline BECERRIL    Visit Date: May 24, 2021    Provider: JONE Francis   Location: [facility]   Location Address: [address]  [place], [place]  [id number]   Location Phone: [phone]          Chief Complaint  · rash      History Of Present Illness  Pati Leon is a 87 year old /White female who presents for evaluation and treatment of:      daughter Yenni with her    Rash for about a month now, started first of May, was treated wth abx and steroids, right after she stopped the steroids it started cming back with blistering now. Is on her hands, hips, now on her bottom, some are fluid filled, no new meds, no new skin products, itching not burning. She has 2 caregivers and none say they have used anything new. They are quite bothersome. She stopped the Bactrim for 2 weeks and no improvement, thought it might be a med. reaction.     No fevers or any other symptoms.    Covid vaccine was in march.       Past Medical History  Disease Name Date Onset Notes   Age-related macular degeneration 12/13/2018 --    Arthritis --  --    Cataract --  --    Dementia --  --    Essential hypertension 12/13/2018 --    Hemorrhoids --  --    Hip fracture, right 06/05/19 Dr Orta   Hyperlipidemia 12/13/2018 --    Macular degeneration --  --    Malignant Melanoma Of Skin --  2001   Medication monitoring encounter 12/13/2018 --    Palpitations --  --    Screening for depression 12/13/2018 --    Tricuspid Regurgitation --  --          Past Surgical History  Procedure Name Date Notes   Cesarian Section --  --    Hip fracture repair, right 06/05/19 Dr Mariano   Joint Surgery --  --    Melanoma excision 2001 --          Medication List  Name Date Started Instructions   acetaminophen 500 mg oral tablet  take 1 ( 500 mg) tablet every 6 hours while  awake for pain   aspirin 81 mg oral tablet,delayed release (DR/EC)  take 1 tablet (81 mg) by oral route once daily   Bactrim 400-80 mg oral tablet 11/16/2020 take 1 tablet by oral route daily for 90 days   Claritin 10 mg oral tablet  take 1 tablet (10 mg) by oral route once daily   cranberry 400 mg oral capsule  take 1 capsule by oral route daily   Culturelle 10 billion cell oral capsule  take 1 capsule by oral route daily   donepezil 5 mg oral tablet 11/16/2020 take 1 tablet (5 mg) by oral route once daily in the evening for 90 days   Flonase Allergy Relief 50 mcg/actuation nasal spray,suspension 11/16/2020 inhale 2 sprays (100 mcg) in each nostril by intranasal route once daily as needed   ibuprofen 200 mg oral tablet  take 2 tablets by oral route daily   Istalol 0.5 % ophthalmic drops, once daily  instill 1 drop into affected eye(s) by ophthalmic route once daily   Keflex 500 mg oral capsule 05/13/2021 take 1 capsule by oral route 3 times a day for 10 days   losartan 50 mg oral tablet 05/18/2021 take 0.5 tablet by oral route daily for 90 days   Lubricant Redness Reliever 0.05-1 % ophthalmic (eye) drops 09/01/2020 instill 1 drop in affected eye 4 times a day as needed   Medrol (Thanh) 4 mg oral tablets,dose pack 05/13/2021 take by oral route as directed per package instructions   metoprolol succinate 50 mg oral tablet extended release 24 hr 11/16/2020 take 1 tablet (50 mg) by oral route once daily for 90 days   Seroquel 25 mg oral tablet 02/23/2021 take 1 tablet by oral route once a day (at bedtime) for 90 days   Travatan Z 0.004 % ophthalmic drops  instill 1 drop into affected eye(s) by ophthalmic route once daily in the evening   Vistaril 25 mg oral capsule 05/03/2021 take 1 capsule by oral route 2 times a day for 30 days         Allergy List  Allergen Name Date Reaction Notes   NO KNOWN DRUG ALLERGIES --  --  --        Allergies Reconciled  Family Medical History  Disease Name Relative/Age Notes   Stroke  "Mother/   Mother   Heart Disease Brother/  Father/  Sister/   Father; Sister; Brother         Reproductive History  Menstrual   Pregnancy Summary   Total Pregnancies: 1 Full Term: 0 Premature: 0   Ab Induced: 0 Ab Spontaneous: 0 Ectopics: 0   Multiples: 0 Livin         Social History  Finding Status Start/Stop Quantity Notes   Alcohol Never --/-- --  --    Homemaker --  --/-- --  --    lives alone --  --/-- --  --    Other Substance Use Never --/-- --  --    Recreational Drug Use Never --/-- --  no   Retired. --  --/-- --  --    Tobacco Never --/-- --  never smoker    --  --/-- --  --          Immunizations  NameDate Admin Mfg Trade Name Lot Number Route Inj VIS Given VIS Publication   COVID [id number] MOD Moderna COVID-19 Vaccine  NE NE 2021    Comments:    COVID [id number] MOD Moderna COVID-19 Vaccine  [facility] 2021    Comments:    Mwztqfiex45/13/2020 SKB Fluarix, quadrivalent, preservative free [id number] IM LD 2020    Comments:    Prevnar 131 NE Prevnar [id number] IM RD 10/05/2017 2015   Comments:          Review of Systems  · Constitutional  o Denies  o : chills, fatigue, fever, weight gain, weight loss  · Integument  o Admits  o : rash, itching, new skin lesions  · Heme-Lymph  o Denies  o : easy bleeding, easy bruising, edema      Vitals  Date Time BP Position Site L\R Cuff Size HR RR TEMP (F) WT  HT  BMI kg/m2 BSA m2 O2 Sat FR L/min FiO2        2021 11:46 /52 Sitting    76 - R 18 97.1 141lbs 1oz 5'  2\" 25.8 1.67 96 %            Physical Examination  · Constitutional  o Appearance  o : well developed, well-nourished, no acute distress  · Respiratory  o Respiratory Effort  o : breathing unlabored  o Auscultation of Lungs  o : normal breath sounds  · Cardiovascular  o Heart  o : heart rate and rhythm regular  o Peripheral Vascular System  o : no edema  · Psychiatric  o Mood and Affect  o : mood normal, affect appropriate     on the right thigh there is " "a lacy area of redness that is about 8\" in diameter, on the medial side there is a blister that is about 2.5\" across. This is one that I cultured.     there are several similar areas on the left thigh. There are a few scattered round blisters on the hands, on the bottom, the skin is reddened and scaly.           Assessment  · Rash     782.1/R21  · Itching     698.9/L29.9  · Blisters of multiple sites     919.2/R23.8      Plan  · Orders  o CBC with Auto Diff [facility] (48756) - - 2021  o CMP [facility] (96265) - - 2021  o ACO-39: Current medications updated and reviewed (1159F, ) - - 2021  o DERMATOLOGY CONSULTATION (DERMA) - 782.1/R21, 698.9/L29.9, 919.2/R23.8 - 2021  o Thyroid Profile (24750, 05144, THYII) - - 2021  o IgE ab total RAST (90777) - - 2021  o Wound Culture with Sensitivities if indicated [facility] (33407) - 919.2/R23.8 - 2021  · Medications  o triamcinolone acetonide 0.1 % topical cream   SIG: apply a thin layer to the affected area(s) by topical route 2 times per day   DISP: (35) Gram with 1 refills  Prescribed on 2021     o prednisone 20 mg oral tablet   SI tidx3d, 1 bidx3d, 1qdx3d   DISP: (18) Tablet with 0 refills  Prescribed on 2021     o SSD 1 % topical cream   SIG: apply a 1/16 inch (1.5 mm) thick layer to entire burn area by topical route 2 times per day   DISP: (60) Gram with 0 refills  Prescribed on 2021     o Medications have been Reconciled  o Transition of Care or Provider Policy  · Instructions  o Patient was educated/instructed on their diagnosis, treatment and medications prior to discharge from the clinic today.  o Patient instructed to seek medical attention urgently for new or worsening symptoms.  o Call the office with any concerns or questions.  o Chronic conditions reviewed and taken into consideration for today's treatment plan.  o hold the Bactrim while on the Keflex. I might consider holding the Bactrim completely in case it's a " delayed reaction to the abx. F/u in 10 days sooner if needed.   o i'm a little concerned about melvin Sommers, will get her in wht derm asap.   o since she had improvement with the steroids will do another round  · Disposition  o Call or Return if symptoms worsen or persist.  o Care Transition            Electronically Signed by: JONE Francis -Author on May 24, 2021 12:40:46 PM

## 2021-07-01 ENCOUNTER — TELEMEDICINE (OUTPATIENT)
Dept: FAMILY MEDICINE CLINIC | Facility: CLINIC | Age: 86
End: 2021-07-01

## 2021-07-01 DIAGNOSIS — N39.0 CHRONIC UTI (URINARY TRACT INFECTION): Primary | Chronic | ICD-10-CM

## 2021-07-01 DIAGNOSIS — F02.80 OTHER FRONTOTEMPORAL DEMENTIA WITHOUT BEHAVIORAL DISTURBANCE: ICD-10-CM

## 2021-07-01 DIAGNOSIS — G31.09 OTHER FRONTOTEMPORAL DEMENTIA WITHOUT BEHAVIORAL DISTURBANCE: ICD-10-CM

## 2021-07-01 PROBLEM — I07.1 TRICUSPID REGURGITATION: Status: ACTIVE | Noted: 2021-07-01

## 2021-07-01 PROBLEM — C43.9 MALIGNANT MELANOMA OF SKIN: Status: ACTIVE | Noted: 2021-07-01

## 2021-07-01 PROBLEM — E78.5 HYPERLIPIDEMIA: Status: ACTIVE | Noted: 2018-12-13

## 2021-07-01 PROBLEM — M19.90 ARTHRITIS: Status: ACTIVE | Noted: 2021-07-01

## 2021-07-01 PROBLEM — H35.30 AGE-RELATED MACULAR DEGENERATION: Status: ACTIVE | Noted: 2018-12-13

## 2021-07-01 PROBLEM — I10 ESSENTIAL HYPERTENSION: Status: ACTIVE | Noted: 2018-12-13

## 2021-07-01 PROBLEM — H26.9 CATARACT: Status: ACTIVE | Noted: 2021-07-01

## 2021-07-01 PROBLEM — F03.90 DEMENTIA: Status: ACTIVE | Noted: 2021-07-01

## 2021-07-01 PROBLEM — Z51.81 ENCOUNTER FOR THERAPEUTIC DRUG MONITORING: Status: ACTIVE | Noted: 2018-12-13

## 2021-07-01 PROCEDURE — 99213 OFFICE O/P EST LOW 20 MIN: CPT | Performed by: NURSE PRACTITIONER

## 2021-07-01 RX ORDER — ACETAMINOPHEN 500 MG
500 TABLET ORAL EVERY MORNING
COMMUNITY

## 2021-07-01 RX ORDER — HYDROXYZINE PAMOATE 25 MG/1
25 CAPSULE ORAL DAILY
Qty: 90 CAPSULE | Refills: 1 | Status: SHIPPED | OUTPATIENT
Start: 2021-07-01 | End: 2022-01-12

## 2021-07-01 RX ORDER — LORATADINE 10 MG/1
TABLET ORAL
COMMUNITY
End: 2022-07-27 | Stop reason: SDUPTHER

## 2021-07-01 RX ORDER — AMLODIPINE BESYLATE 5 MG/1
TABLET ORAL
COMMUNITY
End: 2021-07-01

## 2021-07-01 RX ORDER — METOPROLOL SUCCINATE 50 MG/1
TABLET, EXTENDED RELEASE ORAL
COMMUNITY
End: 2021-08-18

## 2021-07-01 RX ORDER — HYDROXYZINE PAMOATE 25 MG/1
25 CAPSULE ORAL 2 TIMES DAILY
COMMUNITY
Start: 2021-05-03 | End: 2021-07-01 | Stop reason: SDUPTHER

## 2021-07-01 RX ORDER — DONEPEZIL HYDROCHLORIDE 5 MG/1
5 TABLET, FILM COATED ORAL NIGHTLY
Qty: 90 TABLET | Refills: 1 | Status: SHIPPED | OUTPATIENT
Start: 2021-07-01 | End: 2022-01-12

## 2021-07-01 RX ORDER — DONEPEZIL HYDROCHLORIDE 5 MG/1
5 TABLET, FILM COATED ORAL NIGHTLY
COMMUNITY
End: 2021-07-01 | Stop reason: SDUPTHER

## 2021-07-01 RX ORDER — QUETIAPINE FUMARATE 25 MG/1
TABLET, FILM COATED ORAL
COMMUNITY
End: 2021-07-01

## 2021-07-01 RX ORDER — TIMOLOL MALEATE 6.8 MG/ML
1 SOLUTION/ DROPS OPHTHALMIC
COMMUNITY

## 2021-07-01 RX ORDER — LOSARTAN POTASSIUM 50 MG/1
25 TABLET ORAL DAILY
COMMUNITY
Start: 2021-05-18 | End: 2021-12-02

## 2021-07-01 RX ORDER — ASPIRIN 81 MG/1
81 TABLET ORAL DAILY
COMMUNITY

## 2021-07-01 RX ORDER — TRAVOPROST OPHTHALMIC SOLUTION 0.04 MG/ML
1 SOLUTION OPHTHALMIC
COMMUNITY

## 2021-07-01 NOTE — ASSESSMENT & PLAN NOTE
She saw Diamond about 4 to 6 weeks ago for a rash.  She was given antibiotics and steroids.  Then she had to see dermatology after all.  She was having blisters.  They have given her 3 steroid shots and since then all the blisters/bullous are gone.  The dermatologist thought that it could be related to her medications especially her Bactrim.  They would not specifically say though.  So her daughter Yenni who we did this conversation with has started back on the Bactrim and within the first 2 days the rash/blisters started again.  She once again stopped the Bactrim.  Patient is not having any urinary tract symptom issues right now.  We will hold from starting back any current medications on the UTI until she starts showing signs and symptoms of a UTI.  At that time patient's daughter will bring in urine for sample.

## 2021-07-01 NOTE — PROGRESS NOTES
Chief Complaint  Med Refill (Discuss changing antibotics)    Subjective          Pati Leon presents to [facility]  History of Present Illness  The use of a video visit has been reviewed with the patient and verbal informed consent has been obtained.  Pt's daughter did a zoom through [employer] and we had to use the telephone to do voice.    Past Medical History:   • Age-related macular degeneration   • Arthritis   • Cataract   • Dementia (CMS/HCC)   • Essential hypertension   • Hemorrhoids   • Hip fracture (CMS/HCC)    Right, Dr. Orta   • HLD (hyperlipidemia)   • Macular degeneration   • Malignant melanoma of skin (CMS/HCC)   • Palpitations   • Tricuspid regurgitation       Allergies  Patient has no known allergies.    Past Surgical History:   •  SECTION   • HIP FRACTURE SURGERY    Dr. Orta   • OTHER SURGICAL HISTORY    Joint Surgery   • SKIN CANCER EXCISION    Melanoma       Social History     Tobacco Use   • Smoking status: Never Smoker   • Smokeless tobacco: Never Used   Substance Use Topics   • Alcohol use: Never   • Drug use: Never       Family History   Problem Relation Age of Onset   • Stroke Mother    • Heart disease Father    • Heart disease Sister    • Heart disease Brother         Health Maintenance Due   Topic Date Due   • DXA SCAN  Never done   • TDAP/TD VACCINES (1 - Tdap) Never done   • ZOSTER VACCINE (1 of 2) Never done   • Pneumococcal Vaccine 65+ (2 of 2 - PPSV23) 10/05/2018   • ANNUAL WELLNESS VISIT  Never done   • LIPID PANEL  2021          Current Outpatient Medications:   •  acetaminophen (TYLENOL) 500 MG tablet, acetaminophen 500 mg oral tablet take 1 ( 500 mg)  tablet every 6 hours while awake for pain   Active, Disp: , Rfl:   •  aspirin (aspirin) 81 MG EC tablet, aspirin 81 mg oral tablet,delayed release (DR/EC) take 1 tablet (81 mg) by oral route once daily   Active, Disp: , Rfl:   •  Calcium Carbonate-Vitamin D (calcium-vitamin D) 500-200  MG-UNIT tablet per tablet, Calcium 500 + D 500 mg(1,250mg) -200 unit oral tablet take 1 tablet by oral route 2 times a day   Suspended, Disp: , Rfl:   •  donepezil (ARICEPT) 5 MG tablet, Take 1 tablet by mouth Every Night., Disp: 90 tablet, Rfl: 1  •  hydrOXYzine pamoate (VISTARIL) 25 MG capsule, Take 1 capsule by mouth Daily., Disp: 90 capsule, Rfl: 1  •  loratadine (Claritin) 10 MG tablet, Claritin 10 mg oral tablet take 1 tablet (10 mg) by oral route once daily   Suspended, Disp: , Rfl:   •  losartan (COZAAR) 50 MG tablet, Take 25 mg by mouth Daily., Disp: , Rfl:   •  metoprolol succinate XL (TOPROL-XL) 50 MG 24 hr tablet, metoprolol succinate 50 mg oral tablet extended release 24 hr take 1 tablet (50 mg) by oral route twice daily   Suspended, Disp: , Rfl:   •  Timolol Maleate (Istalol) 0.5 % (DAILY) solution, 1 drop., Disp: , Rfl:   •  travoprost, JUNE free, (Travatan Z) 0.004 % solution ophthalmic solution, 1 drop., Disp: , Rfl:     Medications Discontinued During This Encounter   Medication Reason   • amLODIPine (NORVASC) 5 MG tablet *Therapy completed   • QUEtiapine (SEROquel) 25 MG tablet *Therapy completed   • hydrOXYzine pamoate (VISTARIL) 25 MG capsule Reorder   • donepezil (ARICEPT) 5 MG tablet Reorder       Immunization History   Administered Date(s) Administered   • COVID-19 (MODERNA) 02/03/2021, 03/03/2021   • Influenza, Unspecified 01/13/2020   • Pneumococcal Conjugate 13-Valent (PCV13) 10/05/2017       Review of Systems     Objective       There were no vitals filed for this visit.  There is no height or weight on file to calculate BMI.         Physical Exam        Result Review :     The following data was reviewed by: JONE Fabian on 07/01/2021:                     Assessment and Plan      Diagnoses and all orders for this visit:    1. Chronic UTI (urinary tract infection) (Primary)  Assessment & Plan:  She saw Diamond about 4 to 6 weeks ago for a rash.  She was given antibiotics and  steroids.  Then she had to see dermatology after all.  She was having blisters.  They have given her 3 steroid shots and since then all the blisters/bullous are gone.  The dermatologist thought that it could be related to her medications especially her Bactrim.  They would not specifically say though.  So her daughter Yenni who we did this conversation with has started back on the Bactrim and within the first 2 days the rash/blisters started again.  She once again stopped the Bactrim.  Patient is not having any urinary tract symptom issues right now.  We will hold from starting back any current medications on the UTI until she starts showing signs and symptoms of a UTI.  At that time patient's daughter will bring in urine for sample.       2. Other frontotemporal dementia without behavioral disturbance (CMS/HCC)  Assessment & Plan:  She then started to try the ibuprofen and and has not started back on the Aricept or Seroquel yet.  She will start back with Aricept next week to see if her mom has a reaction if so we will stop that too.  She will take the hydroxyzine daily and twice a day if needed for the itching.  We will follow up in 2 weeks with a phone call.      Other orders  -     hydrOXYzine pamoate (VISTARIL) 25 MG capsule; Take 1 capsule by mouth Daily.  Dispense: 90 capsule; Refill: 1  -     donepezil (ARICEPT) 5 MG tablet; Take 1 tablet by mouth Every Night.  Dispense: 90 tablet; Refill: 1          Follow Up     No follow-ups on file.    Patient was given instructions and counseling regarding her condition or for health maintenance advice. Please see specific information pulled into the AVS if appropriate.   Patient's daughter wanted just to have a consultation with me to talk about her mom regarding her medications.  We will start back on the Aricept next week and she will keep me posted if she has any blisters.  We will do a UTI test when appropriate.  At this time we will hold from any new antibiotics  regarding her chronic UTIs.  We will follow up as needed.    Madeline Trammell, APRN

## 2021-07-01 NOTE — ASSESSMENT & PLAN NOTE
She then started to try the ibuprofen and and has not started back on the Aricept or Seroquel yet.  She will start back with Aricept next week to see if her mom has a reaction if so we will stop that too.  She will take the hydroxyzine daily and twice a day if needed for the itching.  We will follow up in 2 weeks with a phone call.

## 2021-07-15 VITALS
HEART RATE: 76 BPM | WEIGHT: 141.06 LBS | RESPIRATION RATE: 18 BRPM | OXYGEN SATURATION: 96 % | SYSTOLIC BLOOD PRESSURE: 140 MMHG | HEIGHT: 62 IN | BODY MASS INDEX: 25.96 KG/M2 | DIASTOLIC BLOOD PRESSURE: 52 MMHG | TEMPERATURE: 97.1 F

## 2021-07-20 ENCOUNTER — TELEPHONE (OUTPATIENT)
Dept: FAMILY MEDICINE CLINIC | Facility: CLINIC | Age: 86
End: 2021-07-20

## 2021-07-21 ENCOUNTER — TELEPHONE (OUTPATIENT)
Dept: FAMILY MEDICINE CLINIC | Facility: CLINIC | Age: 86
End: 2021-07-21

## 2021-07-21 PROBLEM — R53.83 LETHARGIC: Status: ACTIVE | Noted: 2021-07-21

## 2021-07-21 PROCEDURE — 87086 URINE CULTURE/COLONY COUNT: CPT | Performed by: NURSE PRACTITIONER

## 2021-07-21 NOTE — TELEPHONE ENCOUNTER
Patient daughter Yenni Allen phoned office said patient sleeping a lot, slurring speech.Yenni ask if she could collect ua to see if patient has uti. Yenni will pickup cup for collection.

## 2021-08-10 ENCOUNTER — CLINICAL SUPPORT (OUTPATIENT)
Dept: FAMILY MEDICINE CLINIC | Facility: CLINIC | Age: 86
End: 2021-08-10

## 2021-08-10 ENCOUNTER — TELEPHONE (OUTPATIENT)
Dept: FAMILY MEDICINE CLINIC | Facility: CLINIC | Age: 86
End: 2021-08-10

## 2021-08-10 DIAGNOSIS — R41.0 CONFUSION: Primary | ICD-10-CM

## 2021-08-10 PROCEDURE — 87086 URINE CULTURE/COLONY COUNT: CPT | Performed by: NURSE PRACTITIONER

## 2021-08-10 NOTE — TELEPHONE ENCOUNTER
Yenni states her mom was more confused yesterday and would like to do a urine check. I advise her to stop by for a urine cup.

## 2021-08-11 ENCOUNTER — CLINICAL SUPPORT (OUTPATIENT)
Dept: FAMILY MEDICINE CLINIC | Facility: CLINIC | Age: 86
End: 2021-08-11

## 2021-08-11 DIAGNOSIS — R41.0 CONFUSION: Primary | ICD-10-CM

## 2021-08-11 LAB — BACTERIA SPEC AEROBE CULT: NORMAL

## 2021-08-11 PROCEDURE — 87086 URINE CULTURE/COLONY COUNT: CPT | Performed by: NURSE PRACTITIONER

## 2021-08-12 LAB — BACTERIA SPEC AEROBE CULT: NORMAL

## 2021-08-18 RX ORDER — METOPROLOL SUCCINATE 50 MG/1
TABLET, EXTENDED RELEASE ORAL
Qty: 90 TABLET | Refills: 1 | Status: SHIPPED | OUTPATIENT
Start: 2021-08-18 | End: 2022-03-31

## 2021-10-04 ENCOUNTER — OFFICE VISIT (OUTPATIENT)
Dept: FAMILY MEDICINE CLINIC | Facility: CLINIC | Age: 86
End: 2021-10-04

## 2021-10-04 VITALS
OXYGEN SATURATION: 98 % | HEIGHT: 62 IN | WEIGHT: 143.3 LBS | SYSTOLIC BLOOD PRESSURE: 140 MMHG | DIASTOLIC BLOOD PRESSURE: 80 MMHG | BODY MASS INDEX: 26.37 KG/M2 | HEART RATE: 78 BPM | RESPIRATION RATE: 20 BRPM | TEMPERATURE: 97.5 F

## 2021-10-04 DIAGNOSIS — R41.0 CONFUSION: Primary | ICD-10-CM

## 2021-10-04 DIAGNOSIS — G31.09 OTHER FRONTOTEMPORAL DEMENTIA WITHOUT BEHAVIORAL DISTURBANCE: ICD-10-CM

## 2021-10-04 DIAGNOSIS — N39.0 CHRONIC UTI (URINARY TRACT INFECTION): ICD-10-CM

## 2021-10-04 DIAGNOSIS — F02.80 OTHER FRONTOTEMPORAL DEMENTIA WITHOUT BEHAVIORAL DISTURBANCE: ICD-10-CM

## 2021-10-04 LAB
BILIRUB BLD-MCNC: NEGATIVE MG/DL
CLARITY, POC: CLEAR
COLOR UR: YELLOW
GLUCOSE UR STRIP-MCNC: NEGATIVE MG/DL
KETONES UR QL: NEGATIVE
LEUKOCYTE EST, POC: NEGATIVE
NITRITE UR-MCNC: NEGATIVE MG/ML
PH UR: 5.5 [PH] (ref 5–8)
PROT UR STRIP-MCNC: ABNORMAL MG/DL
RBC # UR STRIP: NEGATIVE /UL
SP GR UR: 1.03 (ref 1–1.03)
UROBILINOGEN UR QL: NORMAL

## 2021-10-04 PROCEDURE — 99213 OFFICE O/P EST LOW 20 MIN: CPT | Performed by: NURSE PRACTITIONER

## 2021-10-04 PROCEDURE — 81003 URINALYSIS AUTO W/O SCOPE: CPT | Performed by: NURSE PRACTITIONER

## 2021-10-04 PROCEDURE — 87086 URINE CULTURE/COLONY COUNT: CPT | Performed by: NURSE PRACTITIONER

## 2021-10-04 NOTE — PROGRESS NOTES
Chief Complaint  Fatigue, Altered Mental Status, and Headache    Subjective          Pati Leon presents to [facility]  History of Present Illness  She is having increased hallucinations.  She is not having any pain.  She is weak/confused.  She is wanting to know what and where.  She didn't go back to the derm last week.  She has not had a steroid shot in 2 months.  She has been off the bactrim and ibu but the s/s have gotten worse.      Depression Screening Not Performed    Past Medical History:   • Age-related macular degeneration   • Arthritis   • Cataract   • Dementia (HCC)   • Essential hypertension   • Hemorrhoids   • Hip fracture (HCC)    Right, Dr. Orta   • HLD (hyperlipidemia)   • Macular degeneration   • Malignant melanoma of skin (HCC)   • Palpitations   • Tricuspid regurgitation       Allergies  Patient has no known allergies.    Past Surgical History:   •  SECTION   • HIP FRACTURE SURGERY    Dr. Orta   • OTHER SURGICAL HISTORY    Joint Surgery   • SKIN CANCER EXCISION    Melanoma       Social History     Tobacco Use   • Smoking status: Never Smoker   • Smokeless tobacco: Never Used   Substance Use Topics   • Alcohol use: Never   • Drug use: Never       Family History   Problem Relation Age of Onset   • Stroke Mother    • Heart disease Father    • Heart disease Sister    • Heart disease Brother         Health Maintenance Due   Topic Date Due   • DXA SCAN  Never done   • TDAP/TD VACCINES (1 - Tdap) Never done   • ZOSTER VACCINE (1 of 2) Never done   • Pneumococcal Vaccine 65+ (2 of 2 - PPSV23) 10/05/2018   • ANNUAL WELLNESS VISIT  Never done   • LIPID PANEL  2021   • INFLUENZA VACCINE  2021          Current Outpatient Medications:   •  acetaminophen (TYLENOL) 500 MG tablet, acetaminophen 500 mg oral tablet take 1 ( 500 mg)  tablet every 6 hours while awake for pain   Active, Disp: , Rfl:   •  aspirin (aspirin) 81 MG EC tablet, aspirin 81 mg oral  "tablet,delayed release (DR/EC) take 1 tablet (81 mg) by oral route once daily   Active, Disp: , Rfl:   •  Calcium Carbonate-Vitamin D (calcium-vitamin D) 500-200 MG-UNIT tablet per tablet, Calcium 500 + D 500 mg(1,250mg) -200 unit oral tablet take 1 tablet by oral route 2 times a day   Suspended, Disp: , Rfl:   •  donepezil (ARICEPT) 5 MG tablet, Take 1 tablet by mouth Every Night., Disp: 90 tablet, Rfl: 1  •  hydrOXYzine pamoate (VISTARIL) 25 MG capsule, Take 1 capsule by mouth Daily., Disp: 90 capsule, Rfl: 1  •  loratadine (Claritin) 10 MG tablet, Claritin 10 mg oral tablet take 1 tablet (10 mg) by oral route once daily   Suspended, Disp: , Rfl:   •  losartan (COZAAR) 50 MG tablet, Take 25 mg by mouth Daily., Disp: , Rfl:   •  metoprolol succinate XL (TOPROL-XL) 50 MG 24 hr tablet, TAKE ONE TABLET BY MOUTH ONCE DAILY, Disp: 90 tablet, Rfl: 1  •  Timolol Maleate (Istalol) 0.5 % (DAILY) solution, 1 drop., Disp: , Rfl:   •  travoprost, JUNE free, (Travatan Z) 0.004 % solution ophthalmic solution, 1 drop., Disp: , Rfl:     There are no discontinued medications.    Immunization History   Administered Date(s) Administered   • COVID-19 (MODERNA) 02/03/2021, 03/03/2021   • Influenza, Unspecified 01/13/2020   • Pneumococcal Conjugate 13-Valent (PCV13) 10/05/2017       Review of Systems   Gastrointestinal: Negative for nausea and vomiting.   Neurological: Positive for dizziness and confusion.        Objective       Vitals:    10/04/21 1555   BP: 140/80   Pulse:    Resp:    Temp:    SpO2:      Body mass index is 26.21 kg/m².     Vitals:    10/04/21 1516 10/04/21 1555   BP: 160/80 140/80   Pulse: 78    Resp: 20    Temp: 97.5 °F (36.4 °C)    SpO2: 98%    Weight: 65 kg (143 lb 4.8 oz)    Height: 157.5 cm (62\")          Physical Exam  Vitals reviewed.   Constitutional:       Appearance: Normal appearance. She is well-developed.   Cardiovascular:      Rate and Rhythm: Normal rate and regular rhythm.      Heart sounds: Normal " heart sounds. No murmur heard.     Pulmonary:      Effort: Pulmonary effort is normal.      Breath sounds: Normal breath sounds.   Neurological:      Mental Status: She is alert.      Cranial Nerves: No cranial nerve deficit.      Motor: No weakness.   Psychiatric:         Mood and Affect: Mood and affect normal.             Result Review :     The following data was reviewed by: JONE Fabian on 10/04/2021:    UA    Urinalysis 1/22/21 10/4/21   Specific [place], UA 1.021    Ketones, UA NEGATIVE Negative   Blood, UA LARGE (A)    Leukocytes, UA TRACE (A) Negative   Nitrite, UA NEGATIVE    RBC, UA TNTC(>51) (A)    (A) Abnormal value       Comments are available for some flowsheets but are not being displayed.                          Assessment and Plan      Diagnoses and all orders for this visit:    1. Confusion (Primary)  -     POCT urinalysis dipstick, automated    2. Chronic UTI (urinary tract infection)  -     Urine Culture - Urine, Urine, Clean Catch    3. Other frontotemporal dementia without behavioral disturbance (HCC)            Follow Up     No follow-ups on file.  We will do the urine and urine cult today and if needed we will do 8 days of macrobid then go to the daily 50mg of macrodantin.  Drink plenty of fluids.  Call with any concerns or questions.  Discussed also that it could be the dementia getting worse.  We may need to adjust medications for that.  Patient was given instructions and counseling regarding her condition or for health maintenance advice. Please see specific information pulled into the AVS if appropriate.   JONE Fabian

## 2021-10-05 LAB — BACTERIA SPEC AEROBE CULT: NORMAL

## 2021-10-06 RX ORDER — QUETIAPINE FUMARATE 25 MG/1
25 TABLET, FILM COATED ORAL NIGHTLY
Qty: 30 TABLET | Refills: 2 | Status: SHIPPED | OUTPATIENT
Start: 2021-10-06 | End: 2022-03-31

## 2021-10-28 ENCOUNTER — OFFICE VISIT (OUTPATIENT)
Dept: UROLOGY | Facility: CLINIC | Age: 86
End: 2021-10-28

## 2021-10-28 VITALS — RESPIRATION RATE: 16 BRPM | WEIGHT: 142.8 LBS | BODY MASS INDEX: 23.79 KG/M2 | HEIGHT: 65 IN

## 2021-10-28 DIAGNOSIS — N39.0 RECURRENT URINARY TRACT INFECTION: ICD-10-CM

## 2021-10-28 DIAGNOSIS — N95.2 ATROPHIC VAGINITIS: Primary | ICD-10-CM

## 2021-10-28 PROCEDURE — 99214 OFFICE O/P EST MOD 30 MIN: CPT | Performed by: NURSE PRACTITIONER

## 2021-10-28 PROCEDURE — 87086 URINE CULTURE/COLONY COUNT: CPT | Performed by: NURSE PRACTITIONER

## 2021-10-28 RX ORDER — TITANIUM DIOXIDE, OCTINOXATE, ZINC OXIDE 4.61; 1.6; .78 G/40ML; G/40ML; G/40ML
CREAM TOPICAL
COMMUNITY
End: 2022-07-27

## 2021-10-28 RX ORDER — ESTRADIOL 0.1 MG/G
1 CREAM VAGINAL 3 TIMES WEEKLY
Qty: 42.5 G | Refills: 6 | Status: SHIPPED | OUTPATIENT
Start: 2021-10-29 | End: 2022-01-27

## 2021-10-28 NOTE — PROGRESS NOTES
"Chief Complaint: Cystitis (was on bactrim but can't take now and she does not act like she did on bactrim)    Subjective         History of Present Illness  Pati Leon is a 88 y.o. female presents to [facility] to be seen for recurrent urinary tract infections.    She does have a HX of urosepsis in last may.    She was started on prophylactic antibiotic therapy daily with bactrim.     She was diagnosed with bullous pemphigoid and this was thought to be related to the Bactrim so the Bactrim was discontinued by dermatology.    Her daughter states since coming off of prophylactic bactrim she has had worsening issues with intermittent confusion like she was having previously when she had recurrent urinary tract infections.    She drinks water and cranberry juice and her daughter states that they \"push\" fluids but unsure how much she is getting.     Her daughter wishes to trial macrobid.    Her daughter states she feels as if the patient is very confused today.     The patient's daughter states that she often complains of vaginal pain as well.      Urine cultures:     10/4/2021 greater than 100,000 colony-forming units per milliliter of mixed manny    8/11/2021 less than 25,000 colony-forming units per milliliter of mixed manny    8/10/2021 25,000 colony-forming units per milliliter of mixed manny    7/21/2021 25,000 colony-forming units per milliliter of mixed manny     1/22/2021 multiple organisms isolated probable contamination not suggestive of urinary tract infection.    10/16/2020 vancomycin-resistant Enterococcus faecalis only susceptible to ampicillin, linezolid, nitrofurantoin, daptomycin    9/25/2020 Enterococcus faecium 60,000 colony-forming units per milliliter E. coli 15,000 colony-forming units per milliliter resistant to erythromycin nitrofurantoin ampicillin, Bactrim, ampicillin/sulbactam    9/15/2020 E. coli greater than 100,000 colony-forming units per milliliter, " vancomycin-resistant Enterococcus faecalis resistant to ampicillin/sulbactam, ampicillin, Bactrim, ciprofloxacin, levofloxacin, tetracycline, gentamicin, erythromycin, vancomycin, doxycycline    2020 E. coli greater than 100,000 colony-forming units per milliliter pansensitive    2020 Klebsiella oxytoca 20,000 colony-forming units per milliliter, Enterococcus faecium greater than 100,000 coliform units per milliliter resistant to ampicillin, intermittent susceptibility to nitrofurantoin    2020 no uropathogens    2019 no uropathogens      Objective     Past Medical History:   Diagnosis Date   • Age-related macular degeneration 2018   • Arthritis    • Cataract    • Dementia (HCC)    • Essential hypertension 2018   • Hemorrhoids    • Hip fracture (HCC) 2019    Right, Dr. Orta   • HLD (hyperlipidemia) 2018   • Macular degeneration    • Malignant melanoma of skin (HCC)    • Palpitations    • Tricuspid regurgitation        Past Surgical History:   Procedure Laterality Date   •  SECTION     • HIP FRACTURE SURGERY Right 2019    Dr. Orta   • OTHER SURGICAL HISTORY      Joint Surgery   • SKIN CANCER EXCISION      Melanoma         Current Outpatient Medications:   •  acetaminophen (TYLENOL) 500 MG tablet, acetaminophen 500 mg oral tablet take 1 ( 500 mg)  tablet every 6 hours while awake for pain   Active, Disp: , Rfl:   •  aspirin (aspirin) 81 MG EC tablet, aspirin 81 mg oral tablet,delayed release (DR/EC) take 1 tablet (81 mg) by oral route once daily   Active, Disp: , Rfl:   •  Cranberry 400 MG capsule, cranberry 400 mg oral capsule take 1 capsule by oral route daily   Active, Disp: , Rfl:   •  donepezil (ARICEPT) 5 MG tablet, Take 1 tablet by mouth Every Night., Disp: 90 tablet, Rfl: 1  •  [START ON 10/29/2021] estradiol (ESTRACE) 0.1 MG/GM vaginal cream, Insert 1 g into the vagina 3 (Three) Times a Week for 90 days., Disp: 42.5 g, Rfl: 6  •  hydrOXYzine  "pamoate (VISTARIL) 25 MG capsule, Take 1 capsule by mouth Daily., Disp: 90 capsule, Rfl: 1  •  loratadine (Claritin) 10 MG tablet, Claritin 10 mg oral tablet take 1 tablet (10 mg) by oral route once daily   Suspended, Disp: , Rfl:   •  losartan (COZAAR) 50 MG tablet, Take 25 mg by mouth Daily., Disp: , Rfl:   •  metoprolol succinate XL (TOPROL-XL) 50 MG 24 hr tablet, TAKE ONE TABLET BY MOUTH ONCE DAILY, Disp: 90 tablet, Rfl: 1  •  QUEtiapine (SEROquel) 25 MG tablet, Take 1 tablet by mouth Every Night., Disp: 30 tablet, Rfl: 2  •  Timolol Maleate (Istalol) 0.5 % (DAILY) solution, 1 drop., Disp: , Rfl:   •  travoprost, JUNE free, (Travatan Z) 0.004 % solution ophthalmic solution, 1 drop., Disp: , Rfl:     Allergies   Allergen Reactions   • Sulfa Antibiotics Unknown - Low Severity   • Keflex [Cephalexin] Rash        Family History   Problem Relation Age of Onset   • Stroke Mother    • Heart disease Father    • Heart disease Sister    • Heart disease Brother        Social History     Socioeconomic History   • Marital status:    Tobacco Use   • Smoking status: Never Smoker   • Smokeless tobacco: Never Used   Vaping Use   • Vaping Use: Never used   Substance and Sexual Activity   • Alcohol use: Never   • Drug use: Never       Vital Signs:   Resp 16   Ht 165.1 cm (65\")   Wt 64.8 kg (142 lb 12.8 oz)   BMI 23.76 kg/m²      Physical Exam     Result Review :   The following data was reviewed by: JONE Bryson on 10/28/2021:  Results for orders placed or performed in visit on 10/04/21   Urine Culture - Urine, Urine, Clean Catch    Specimen: Urine, Clean Catch   Result Value Ref Range    Urine Culture >100,000 CFU/mL Mixed Tana Isolated    POCT urinalysis dipstick, automated    Specimen: Urine   Result Value Ref Range    Color Yellow Yellow, Straw, Dark Yellow, Shana    Clarity, UA Clear Clear    Specific Gravity  1.030 1.005 - 1.030    pH, Urine 5.5 5.0 - 8.0    Leukocytes Negative Negative    Nitrite, UA " Negative Negative    Protein, POC Trace (A) Negative mg/dL    Glucose, UA Negative Negative, 1000 mg/dL (3+) mg/dL    Ketones, UA Negative Negative    Urobilinogen, UA Normal Normal    Bilirubin Negative Negative    Blood, UA Negative Negative          Procedures        Assessment and Plan    Diagnoses and all orders for this visit:    1. Atrophic vaginitis (Primary)  -     estradiol (ESTRACE) 0.1 MG/GM vaginal cream; Insert 1 g into the vagina 3 (Three) Times a Week for 90 days.  Dispense: 42.5 g; Refill: 6      Discussed with the patient and the patient's daughter that unsure at this point if her symptoms are from a urologic cause given that her last 4 urine cultures have all been heavily contaminated with vaginal manny.    Discussed with the patient's daughter that prophylactic therapy with Macrobid at this point in time does not seem like a viable option especially given that her last 4 cultures have not had a predominant bacteria or sensitivity report that would support placing her on Macrobid.  We did discuss that no phylactic treatment with an antibiotic such as Macrobid may cause dizziness, drowsiness, confusion, pulmonary fibrosis, decrease in renal function studies all of which could be potentially fatal for the elderly population.    Discussed that given her age the contamination is likely related to vaginal tissue atrophy and that her vaginal pain may be alleviated by trial of vaginal Estrace cream which can also provide a benefit in prevention of recurrent urinary tract infections as well as contamination from vaginal manny.    Recommend catheterized specimen for potential urinary tract infection symptoms.  Patient's daughter to call the office with any symptoms for an office visit to be catheterized or may present to PCP office should they be equipped to perform catheterized specimen.        I spent 30 minutes caring for Pati on this date of service. This time includes time spent by me in the  following activities:reviewing tests, obtaining and/or reviewing a separately obtained history, performing a medically appropriate examination and/or evaluation , counseling and educating the patient/family/caregiver, ordering medications, tests, or procedures, and documenting information in the medical record  Follow Up   Return in about 3 months (around 1/28/2022) for f/u UTIs/ Vaginitis .  Patient was given instructions and counseling regarding her condition or for health maintenance advice. Please see specific information pulled into the AVS if appropriate.         This document has been electronically signed by JONE Bryson  October 28, 2021 14:46 EDT

## 2021-10-29 LAB — BACTERIA SPEC AEROBE CULT: NORMAL

## 2021-11-15 ENCOUNTER — OFFICE VISIT (OUTPATIENT)
Dept: UROLOGY | Facility: CLINIC | Age: 86
End: 2021-11-15

## 2021-11-15 DIAGNOSIS — R30.0 DYSURIA: Primary | ICD-10-CM

## 2021-11-15 PROCEDURE — 87186 SC STD MICRODIL/AGAR DIL: CPT | Performed by: NURSE PRACTITIONER

## 2021-11-15 PROCEDURE — 87077 CULTURE AEROBIC IDENTIFY: CPT | Performed by: NURSE PRACTITIONER

## 2021-11-15 PROCEDURE — 87086 URINE CULTURE/COLONY COUNT: CPT | Performed by: NURSE PRACTITIONER

## 2021-11-17 DIAGNOSIS — N39.0 URINARY TRACT INFECTION WITHOUT HEMATURIA, SITE UNSPECIFIED: Primary | ICD-10-CM

## 2021-11-17 LAB — BACTERIA SPEC AEROBE CULT: ABNORMAL

## 2021-11-17 RX ORDER — NITROFURANTOIN 25; 75 MG/1; MG/1
100 CAPSULE ORAL 2 TIMES DAILY
Qty: 20 CAPSULE | Refills: 0 | Status: SHIPPED | OUTPATIENT
Start: 2021-11-17 | End: 2021-11-27

## 2021-11-17 RX ORDER — SULFAMETHOXAZOLE AND TRIMETHOPRIM 800; 160 MG/1; MG/1
1 TABLET ORAL 2 TIMES DAILY
Qty: 20 TABLET | Refills: 0 | Status: SHIPPED | OUTPATIENT
Start: 2021-11-17 | End: 2021-11-27

## 2021-12-02 RX ORDER — LOSARTAN POTASSIUM 50 MG/1
TABLET ORAL
Qty: 45 TABLET | Refills: 1 | Status: SHIPPED | OUTPATIENT
Start: 2021-12-02 | End: 2022-07-01

## 2022-01-12 RX ORDER — DONEPEZIL HYDROCHLORIDE 5 MG/1
TABLET, FILM COATED ORAL
Qty: 90 TABLET | Refills: 1 | Status: SHIPPED | OUTPATIENT
Start: 2022-01-12 | End: 2022-07-01

## 2022-01-12 RX ORDER — HYDROXYZINE PAMOATE 25 MG/1
CAPSULE ORAL
Qty: 90 CAPSULE | Refills: 1 | Status: SHIPPED | OUTPATIENT
Start: 2022-01-12 | End: 2022-07-01

## 2022-01-27 ENCOUNTER — TELEPHONE (OUTPATIENT)
Dept: UROLOGY | Facility: CLINIC | Age: 87
End: 2022-01-27

## 2022-03-03 ENCOUNTER — TELEPHONE (OUTPATIENT)
Dept: UROLOGY | Facility: CLINIC | Age: 87
End: 2022-03-03

## 2022-03-08 NOTE — TELEPHONE ENCOUNTER
3RD CALL TO PTS DAUGHTER TO [facility] PT FROM 3/3/2022. NO ANSWER/LMOM. PT CANCELLED APPT 3/3, 1/28, AND NO SHOW 11/15/21, PLEASE ADVISE IF ANYTHING FURTHER TO DO

## 2022-03-31 DIAGNOSIS — G31.09 OTHER FRONTOTEMPORAL DEMENTIA WITHOUT BEHAVIORAL DISTURBANCE: ICD-10-CM

## 2022-03-31 DIAGNOSIS — F02.80 OTHER FRONTOTEMPORAL DEMENTIA WITHOUT BEHAVIORAL DISTURBANCE: ICD-10-CM

## 2022-03-31 RX ORDER — QUETIAPINE FUMARATE 25 MG/1
TABLET, FILM COATED ORAL
Qty: 30 TABLET | Refills: 0 | Status: SHIPPED | OUTPATIENT
Start: 2022-03-31 | End: 2022-07-27

## 2022-03-31 RX ORDER — METOPROLOL SUCCINATE 50 MG/1
TABLET, EXTENDED RELEASE ORAL
Qty: 30 TABLET | Refills: 0 | Status: SHIPPED | OUTPATIENT
Start: 2022-03-31 | End: 2022-04-07

## 2022-04-07 RX ORDER — METOPROLOL SUCCINATE 50 MG/1
TABLET, EXTENDED RELEASE ORAL
Qty: 90 TABLET | Refills: 0 | Status: SHIPPED | OUTPATIENT
Start: 2022-04-07 | End: 2022-07-27 | Stop reason: SDUPTHER

## 2022-05-14 ENCOUNTER — APPOINTMENT (OUTPATIENT)
Dept: GENERAL RADIOLOGY | Facility: HOSPITAL | Age: 87
End: 2022-05-14

## 2022-05-14 ENCOUNTER — HOSPITAL ENCOUNTER (EMERGENCY)
Facility: HOSPITAL | Age: 87
Discharge: HOME | End: 2022-05-15
Attending: EMERGENCY MEDICINE | Admitting: EMERGENCY MEDICINE

## 2022-05-14 DIAGNOSIS — N39.0 URINARY TRACT INFECTION IN FEMALE: Primary | ICD-10-CM

## 2022-05-14 LAB
ALBUMIN SERPL-MCNC: 3.5 G/DL (ref 3.5–5.2)
ALBUMIN/GLOB SERPL: 1.1 G/DL
ALP SERPL-CCNC: 101 U/L (ref 39–117)
ALT SERPL W P-5'-P-CCNC: 5 U/L (ref 1–33)
ANION GAP SERPL CALCULATED.3IONS-SCNC: 11.7 MMOL/L (ref 5–15)
AST SERPL-CCNC: 15 U/L (ref 1–32)
BACTERIA UR QL AUTO: ABNORMAL /HPF
BASOPHILS # BLD AUTO: 0.01 10*3/MM3 (ref 0–0.2)
BASOPHILS NFR BLD AUTO: 0.1 % (ref 0–1.5)
BILIRUB SERPL-MCNC: 0.5 MG/DL (ref 0–1.2)
BILIRUB UR QL STRIP: NEGATIVE
BUN SERPL-MCNC: 9 MG/DL (ref 8–23)
BUN/CREAT SERPL: 14.1 (ref 7–25)
CALCIUM SPEC-SCNC: 8.3 MG/DL (ref 8.6–10.5)
CHLORIDE SERPL-SCNC: 106 MMOL/L (ref 98–107)
CLARITY UR: CLEAR
CO2 SERPL-SCNC: 23.3 MMOL/L (ref 22–29)
COLOR UR: YELLOW
CREAT SERPL-MCNC: 0.64 MG/DL (ref 0.57–1)
DEPRECATED RDW RBC AUTO: 46.5 FL (ref 37–54)
EGFRCR SERPLBLD CKD-EPI 2021: 85.1 ML/MIN/1.73
EOSINOPHIL # BLD AUTO: 0.06 10*3/MM3 (ref 0–0.4)
EOSINOPHIL NFR BLD AUTO: 0.7 % (ref 0.3–6.2)
ERYTHROCYTE [DISTWIDTH] IN BLOOD BY AUTOMATED COUNT: 13.8 % (ref 12.3–15.4)
GLOBULIN UR ELPH-MCNC: 3.2 GM/DL
GLUCOSE SERPL-MCNC: 108 MG/DL (ref 65–99)
GLUCOSE UR STRIP-MCNC: NEGATIVE MG/DL
HCT VFR BLD AUTO: 39.2 % (ref 34–46.6)
HGB BLD-MCNC: 12.8 G/DL (ref 12–15.9)
HGB UR QL STRIP.AUTO: ABNORMAL
HOLD SPECIMEN: NORMAL
HOLD SPECIMEN: NORMAL
HYALINE CASTS UR QL AUTO: ABNORMAL /LPF
IMM GRANULOCYTES # BLD AUTO: 0.04 10*3/MM3 (ref 0–0.05)
IMM GRANULOCYTES NFR BLD AUTO: 0.5 % (ref 0–0.5)
INR PPP: 0.98 (ref 0.86–1.15)
KETONES UR QL STRIP: NEGATIVE
LEUKOCYTE ESTERASE UR QL STRIP.AUTO: ABNORMAL
LIPASE SERPL-CCNC: 38 U/L (ref 13–60)
LYMPHOCYTES # BLD AUTO: 1.47 10*3/MM3 (ref 0.7–3.1)
LYMPHOCYTES NFR BLD AUTO: 17.6 % (ref 19.6–45.3)
MCH RBC QN AUTO: 29.8 PG (ref 26.6–33)
MCHC RBC AUTO-ENTMCNC: 32.7 G/DL (ref 31.5–35.7)
MCV RBC AUTO: 91.2 FL (ref 79–97)
MONOCYTES # BLD AUTO: 0.68 10*3/MM3 (ref 0.1–0.9)
MONOCYTES NFR BLD AUTO: 8.1 % (ref 5–12)
NEUTROPHILS NFR BLD AUTO: 6.11 10*3/MM3 (ref 1.7–7)
NEUTROPHILS NFR BLD AUTO: 73 % (ref 42.7–76)
NITRITE UR QL STRIP: NEGATIVE
NRBC BLD AUTO-RTO: 0 /100 WBC (ref 0–0.2)
PH UR STRIP.AUTO: 7 [PH] (ref 5–8)
PLATELET # BLD AUTO: 162 10*3/MM3 (ref 140–450)
PMV BLD AUTO: 9.6 FL (ref 6–12)
POTASSIUM SERPL-SCNC: 3.1 MMOL/L (ref 3.5–5.2)
PROT SERPL-MCNC: 6.7 G/DL (ref 6–8.5)
PROT UR QL STRIP: NEGATIVE
PROTHROMBIN TIME: 13.1 SECONDS (ref 11.8–14.9)
RBC # BLD AUTO: 4.3 10*6/MM3 (ref 3.77–5.28)
RBC # UR STRIP: ABNORMAL /HPF
REF LAB TEST METHOD: ABNORMAL
SODIUM SERPL-SCNC: 141 MMOL/L (ref 136–145)
SP GR UR STRIP: 1.02 (ref 1–1.03)
SQUAMOUS #/AREA URNS HPF: ABNORMAL /HPF
UROBILINOGEN UR QL STRIP: ABNORMAL
WBC # UR STRIP: ABNORMAL /HPF
WBC NRBC COR # BLD: 8.37 10*3/MM3 (ref 3.4–10.8)

## 2022-05-14 PROCEDURE — 85610 PROTHROMBIN TIME: CPT | Performed by: EMERGENCY MEDICINE

## 2022-05-14 PROCEDURE — 71045 X-RAY EXAM CHEST 1 VIEW: CPT

## 2022-05-14 PROCEDURE — 96374 THER/PROPH/DIAG INJ IV PUSH: CPT

## 2022-05-14 PROCEDURE — 81001 URINALYSIS AUTO W/SCOPE: CPT | Performed by: EMERGENCY MEDICINE

## 2022-05-14 PROCEDURE — 36415 COLL VENOUS BLD VENIPUNCTURE: CPT

## 2022-05-14 PROCEDURE — 99284 EMERGENCY DEPT VISIT MOD MDM: CPT

## 2022-05-14 PROCEDURE — 87086 URINE CULTURE/COLONY COUNT: CPT | Performed by: EMERGENCY MEDICINE

## 2022-05-14 PROCEDURE — 80053 COMPREHEN METABOLIC PANEL: CPT | Performed by: EMERGENCY MEDICINE

## 2022-05-14 PROCEDURE — 85025 COMPLETE CBC W/AUTO DIFF WBC: CPT | Performed by: EMERGENCY MEDICINE

## 2022-05-14 PROCEDURE — 83690 ASSAY OF LIPASE: CPT | Performed by: EMERGENCY MEDICINE

## 2022-05-14 RX ADMIN — METOPROLOL TARTRATE 5 MG: 1 INJECTION, SOLUTION INTRAVENOUS at 23:01

## 2022-05-15 ENCOUNTER — APPOINTMENT (OUTPATIENT)
Dept: CT IMAGING | Facility: HOSPITAL | Age: 87
End: 2022-05-15

## 2022-05-15 VITALS
WEIGHT: 147.05 LBS | OXYGEN SATURATION: 94 % | HEIGHT: 65 IN | TEMPERATURE: 98.8 F | SYSTOLIC BLOOD PRESSURE: 179 MMHG | BODY MASS INDEX: 24.5 KG/M2 | RESPIRATION RATE: 18 BRPM | DIASTOLIC BLOOD PRESSURE: 72 MMHG | HEART RATE: 85 BPM

## 2022-05-15 LAB
WHOLE BLOOD HOLD COAG: NORMAL
WHOLE BLOOD HOLD SPECIMEN: NORMAL

## 2022-05-15 PROCEDURE — 70450 CT HEAD/BRAIN W/O DYE: CPT

## 2022-05-15 PROCEDURE — 72125 CT NECK SPINE W/O DYE: CPT

## 2022-05-15 RX ORDER — NITROFURANTOIN 25; 75 MG/1; MG/1
100 CAPSULE ORAL ONCE
Status: COMPLETED | OUTPATIENT
Start: 2022-05-15 | End: 2022-05-15

## 2022-05-15 RX ORDER — NITROFURANTOIN 25; 75 MG/1; MG/1
100 CAPSULE ORAL 2 TIMES DAILY
Qty: 14 CAPSULE | Refills: 0 | Status: SHIPPED | OUTPATIENT
Start: 2022-05-15 | End: 2022-07-27

## 2022-05-15 RX ADMIN — NITROFURANTOIN MONOHYDRATE/MACROCRYSTALLINE 100 MG: 25; 75 CAPSULE ORAL at 02:29

## 2022-05-15 NOTE — ED PROVIDER NOTES
Time: 10:47 PM EDT  Arrived by: EMS  Chief Complaint: Fall  History provided by: pt and relative  History is limited by: N/A     History of Present Illness:  Patient is a 88 y.o. year old female that presents to the emergency department for evaluation of an unwitnessed fall that occurred PTA. The pt's daughter at bedside provides the history. The daughter reports that the pt was found down outside on her backside by the door. The pt reports that she fell into the back porch door and did hit her head and shoulder. No LOC or neck pain. The pt only took a few steps to get onto the stretcher after the fall, and she has not ambulated other than that. The pt denies any pain to any body system at this time. The pt's symptoms are moderate in severity and have no modifying factors.     She is on ASA. No thinners. The pt has a hx of chronic UTI.      She does have a skin tear to the left forearm present.       History provided by:  Patient and relative   used: No        Similar Symptoms Previously: N/a  Recently seen: N/a      Patient Care Team  Primary Care Provider: Madeline Trammell APRN    Past Medical History:     Allergies   Allergen Reactions   • Sulfa Antibiotics Unknown - Low Severity   • Keflex [Cephalexin] Rash     Past Medical History:   Diagnosis Date   • Age-related macular degeneration 2018   • Arthritis    • Cataract    • Dementia (HCC)    • Essential hypertension 2018   • Hemorrhoids    • Hip fracture (HCC) 2019    Right, Dr. Orta   • HLD (hyperlipidemia) 2018   • Macular degeneration    • Malignant melanoma of skin ([facility])    • Palpitations    • Tricuspid regurgitation      Past Surgical History:   Procedure Laterality Date   •  SECTION     • HIP FRACTURE SURGERY Right 2019    Dr. Orta   • OTHER SURGICAL HISTORY      Joint Surgery   • SKIN CANCER EXCISION      Melanoma     Family History   Problem Relation Age of Onset   • Stroke Mother     • Heart disease Father    • Heart disease Sister    • Heart disease Brother        Home Medications:  Prior to Admission medications    Medication Sig Start Date End Date Taking? Authorizing Provider   acetaminophen (TYLENOL) 500 MG tablet acetaminophen 500 mg oral tablet take 1 ( 500 mg)  tablet every 6 hours while awake for pain   Active    Julian Riley MD   aspirin (aspirin) 81 MG EC tablet aspirin 81 mg oral tablet,delayed release (DR/EC) take 1 tablet (81 mg) by oral route once daily   Active    Julian Riley MD   Cranberry 400 MG capsule cranberry 400 mg oral capsule take 1 capsule by oral route daily   Active    Julian Riley MD   donepezil (ARICEPT) 5 MG tablet TAKE ONE TABLET BY MOUTH EVERY NIGHT 1/12/22   Madeline Trammell APRN   hydrOXYzine pamoate (VISTARIL) 25 MG capsule TAKE ONE CAPSULE BY MOUTH EVERY DAY 1/12/22   Madeline Trammell APRN   loratadine (Claritin) 10 MG tablet Claritin 10 mg oral tablet take 1 tablet (10 mg) by oral route once daily   Suspended    Julian Riley MD   losartan (COZAAR) 50 MG tablet TAKE 1/2 TABLET BY MOUTH EVERY DAY 12/2/21   Madeline Trammell APRN   metoprolol succinate XL (TOPROL-XL) 50 MG 24 hr tablet TAKE ONE TABLET BY MOUTH ONCE DAILY 4/7/22   Madeline Trammell APRN   QUEtiapine (SEROquel) 25 MG tablet TAKE ONE TABLET BY MOUTH EVERY NIGHT 3/31/22   Madeline Trammell APRN   Timolol Maleate (Istalol) 0.5 % (DAILY) solution 1 drop.    Julian Riley MD   travoprost, JUNE free, (Travatan Z) 0.004 % solution ophthalmic solution 1 drop.    Julian Riley MD        Social History:   Social History     Tobacco Use   • Smoking status: Never Smoker   • Smokeless tobacco: Never Used   Vaping Use   • Vaping Use: Never used   Substance Use Topics   • Alcohol use: Never   • Drug use: Never       Review of Systems:  Review of Systems   Constitutional: Negative for chills and fever.   HENT: Negative for  "congestion, nosebleeds and rhinorrhea.    Eyes: Negative for visual disturbance.   Respiratory: Negative for cough and shortness of breath.    Cardiovascular: Negative for chest pain and leg swelling.   Gastrointestinal: Negative for abdominal pain, diarrhea, nausea and vomiting.   Genitourinary: Negative for dysuria and hematuria.   Musculoskeletal: Negative for back pain.   Skin: Positive for wound (Skin tear). Negative for pallor and rash.   Neurological: Negative for weakness, numbness and headaches.        Physical Exam:  /72   Pulse 85   Temp 98.8 °F (37.1 °C) (Oral)   Resp 18   Ht 165.1 cm (65\")   Wt 66.7 kg (147 lb 0.8 oz)   LMP  (LMP Unknown)   SpO2 94%   BMI 24.47 kg/m²     Physical Exam  Vitals and nursing note reviewed.   Constitutional:       General: She is not in acute distress.     Appearance: Normal appearance.   HENT:      Head: Normocephalic and atraumatic.      Mouth/Throat:      Mouth: Mucous membranes are moist.   Eyes:      General: No scleral icterus.     Extraocular Movements: Extraocular movements intact.      Pupils: Pupils are equal, round, and reactive to light.   Cardiovascular:      Rate and Rhythm: Normal rate and regular rhythm.      Pulses: Normal pulses.      Heart sounds: Normal heart sounds. No murmur heard.  Pulmonary:      Effort: Pulmonary effort is normal. No respiratory distress.      Breath sounds: Normal breath sounds. No wheezing.   Abdominal:      General: There is no distension.      Palpations: Abdomen is soft.      Tenderness: There is no abdominal tenderness.   Musculoskeletal:         General: Normal range of motion.      Right shoulder: No deformity, tenderness or bony tenderness. Normal range of motion.      Left shoulder: No deformity, tenderness or bony tenderness. Normal range of motion.      Cervical back: Neck supple.      Right hip: No deformity, tenderness or bony tenderness. Normal range of motion.      Left hip: No deformity, tenderness or " bony tenderness. Normal range of motion.      Right lower leg: No edema.      Left lower leg: No edema.   Skin:     Coloration: Skin is not pale.      Findings: Ecchymosis (Fingers on dorsum of left hand) present.      Comments: Skin tear to left proximal forearm that is 2x4cm and is not actively bleeding.    Neurological:      General: No focal deficit present.      Mental Status: She is alert and oriented to person, place, and time.      Cranial Nerves: No cranial nerve deficit.      Sensory: No sensory deficit.                Medications in the Emergency Department:  Medications   metoprolol tartrate (LOPRESSOR) injection 5 mg (5 mg Intravenous Given 5/14/22 2301)   nitrofurantoin (macrocrystal-monohydrate) (MACROBID) capsule 100 mg (100 mg Oral Given 5/15/22 0229)        Labs  Lab Results (last 24 hours)     ** No results found for the last 24 hours. **           Imaging:  No Radiology Exams Resulted Within Past 24 Hours    Procedures:  Procedures    Progress                            Medical Decision Making:  [facility]     Final diagnoses:   Urinary tract infection in female        Disposition:  ED Disposition     ED Disposition   Discharge    Condition   Stable    Comment   --             Documentation assistance provided by Tai Greer acting as scribe for Tito Gallardo DO. Information recorded by the scribe was done at my direction and has been verified and validated by me.        Tai Greer  05/14/22 2320       Tai Greer  05/14/22 2322       Tito Gallardo DO  05/17/22 2037

## 2022-05-16 LAB — BACTERIA SPEC AEROBE CULT: NORMAL

## 2022-07-01 RX ORDER — LOSARTAN POTASSIUM 50 MG/1
TABLET ORAL
Qty: 15 TABLET | Refills: 0 | Status: SHIPPED | OUTPATIENT
Start: 2022-07-01 | End: 2022-07-27 | Stop reason: SDUPTHER

## 2022-07-01 RX ORDER — DONEPEZIL HYDROCHLORIDE 5 MG/1
TABLET, FILM COATED ORAL
Qty: 30 TABLET | Refills: 0 | Status: SHIPPED | OUTPATIENT
Start: 2022-07-01 | End: 2022-07-27 | Stop reason: SDUPTHER

## 2022-07-01 RX ORDER — HYDROXYZINE PAMOATE 25 MG/1
CAPSULE ORAL
Qty: 30 CAPSULE | Refills: 0 | Status: SHIPPED | OUTPATIENT
Start: 2022-07-01 | End: 2022-07-27 | Stop reason: SDUPTHER

## 2022-07-08 RX ORDER — HYDROXYZINE PAMOATE 25 MG/1
CAPSULE ORAL
Qty: 30 CAPSULE | Refills: 0 | OUTPATIENT
Start: 2022-07-08

## 2022-07-08 RX ORDER — LOSARTAN POTASSIUM 50 MG/1
TABLET ORAL
Qty: 15 TABLET | Refills: 0 | OUTPATIENT
Start: 2022-07-08

## 2022-07-08 RX ORDER — DONEPEZIL HYDROCHLORIDE 5 MG/1
TABLET, FILM COATED ORAL
Qty: 30 TABLET | Refills: 0 | OUTPATIENT
Start: 2022-07-08

## 2022-07-27 ENCOUNTER — OFFICE VISIT (OUTPATIENT)
Dept: FAMILY MEDICINE CLINIC | Facility: CLINIC | Age: 87
End: 2022-07-27

## 2022-07-27 VITALS
TEMPERATURE: 96.1 F | WEIGHT: 128.6 LBS | DIASTOLIC BLOOD PRESSURE: 78 MMHG | HEIGHT: 65 IN | OXYGEN SATURATION: 96 % | SYSTOLIC BLOOD PRESSURE: 140 MMHG | BODY MASS INDEX: 21.43 KG/M2 | HEART RATE: 75 BPM | RESPIRATION RATE: 20 BRPM

## 2022-07-27 DIAGNOSIS — Z23 COVID-19 VACCINE ADMINISTERED: ICD-10-CM

## 2022-07-27 DIAGNOSIS — F02.80 OTHER FRONTOTEMPORAL DEMENTIA WITHOUT BEHAVIORAL DISTURBANCE: ICD-10-CM

## 2022-07-27 DIAGNOSIS — I10 ESSENTIAL HYPERTENSION: ICD-10-CM

## 2022-07-27 DIAGNOSIS — G31.09 OTHER FRONTOTEMPORAL DEMENTIA WITHOUT BEHAVIORAL DISTURBANCE: ICD-10-CM

## 2022-07-27 DIAGNOSIS — Z00.00 MEDICARE ANNUAL WELLNESS VISIT, SUBSEQUENT: Primary | ICD-10-CM

## 2022-07-27 DIAGNOSIS — J30.89 SEASONAL ALLERGIC RHINITIS DUE TO OTHER ALLERGIC TRIGGER: ICD-10-CM

## 2022-07-27 DIAGNOSIS — R06.02 SHORTNESS OF BREATH: ICD-10-CM
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
## 2022-07-27 DIAGNOSIS — R53.1 WEAKNESS: ICD-10-CM

## 2022-07-27 DIAGNOSIS — L29.9 ITCHING: ICD-10-CM

## 2022-07-27 DIAGNOSIS — E78.2 MIXED HYPERLIPIDEMIA: ICD-10-CM

## 2022-07-27 DIAGNOSIS — Z87.440 HISTORY OF UTI: ICD-10-CM

## 2022-07-27 LAB
ALBUMIN SERPL-MCNC: 4.1 G/DL (ref 3.5–5.2)
ALBUMIN/GLOB SERPL: 1.4 G/DL
ALP SERPL-CCNC: 96 U/L (ref 39–117)
ALT SERPL W P-5'-P-CCNC: 8 U/L (ref 1–33)
ANION GAP SERPL CALCULATED.3IONS-SCNC: 14 MMOL/L (ref 5–15)
AST SERPL-CCNC: 15 U/L (ref 1–32)
BASOPHILS # BLD AUTO: 0.01 10*3/MM3 (ref 0–0.2)
BASOPHILS NFR BLD AUTO: 0.1 % (ref 0–1.5)
BILIRUB BLD-MCNC: NEGATIVE MG/DL
BILIRUB SERPL-MCNC: 0.3 MG/DL (ref 0–1.2)
BUN SERPL-MCNC: 12 MG/DL (ref 8–23)
BUN/CREAT SERPL: 17.4 (ref 7–25)
CALCIUM SPEC-SCNC: 9 MG/DL (ref 8.6–10.5)
CHLORIDE SERPL-SCNC: 105 MMOL/L (ref 98–107)
CLARITY, POC: ABNORMAL
CO2 SERPL-SCNC: 26 MMOL/L (ref 22–29)
COLOR UR: YELLOW
CREAT SERPL-MCNC: 0.69 MG/DL (ref 0.57–1)
DEPRECATED RDW RBC AUTO: 44.8 FL (ref 37–54)
EGFRCR SERPLBLD CKD-EPI 2021: 83.6 ML/MIN/1.73
EOSINOPHIL # BLD AUTO: 0.09 10*3/MM3 (ref 0–0.4)
EOSINOPHIL NFR BLD AUTO: 1.1 % (ref 0.3–6.2)
ERYTHROCYTE [DISTWIDTH] IN BLOOD BY AUTOMATED COUNT: 14.2 % (ref 12.3–15.4)
EXPIRATION DATE: ABNORMAL
GLOBULIN UR ELPH-MCNC: 2.9 GM/DL
GLUCOSE SERPL-MCNC: 96 MG/DL (ref 65–99)
GLUCOSE UR STRIP-MCNC: NEGATIVE MG/DL
HCT VFR BLD AUTO: 38.8 % (ref 34–46.6)
HGB BLD-MCNC: 12.9 G/DL (ref 12–15.9)
IMM GRANULOCYTES # BLD AUTO: 0.03 10*3/MM3 (ref 0–0.05)
IMM GRANULOCYTES NFR BLD AUTO: 0.4 % (ref 0–0.5)
KETONES UR QL: ABNORMAL
LEUKOCYTE EST, POC: ABNORMAL
LYMPHOCYTES # BLD AUTO: 2.68 10*3/MM3 (ref 0.7–3.1)
LYMPHOCYTES NFR BLD AUTO: 32.6 % (ref 19.6–45.3)
Lab: ABNORMAL
MCH RBC QN AUTO: 29.1 PG (ref 26.6–33)
MCHC RBC AUTO-ENTMCNC: 33.2 G/DL (ref 31.5–35.7)
MCV RBC AUTO: 87.6 FL (ref 79–97)
MONOCYTES # BLD AUTO: 0.66 10*3/MM3 (ref 0.1–0.9)
MONOCYTES NFR BLD AUTO: 8 % (ref 5–12)
NEUTROPHILS NFR BLD AUTO: 4.75 10*3/MM3 (ref 1.7–7)
NEUTROPHILS NFR BLD AUTO: 57.8 % (ref 42.7–76)
NITRITE UR-MCNC: NEGATIVE MG/ML
NRBC BLD AUTO-RTO: 0 /100 WBC (ref 0–0.2)
NT-PROBNP SERPL-MCNC: 675 PG/ML (ref 0–1800)
PH UR: 5.5 [PH] (ref 5–8)
PLATELET # BLD AUTO: 212 10*3/MM3 (ref 140–450)
PMV BLD AUTO: 10.5 FL (ref 6–12)
POTASSIUM SERPL-SCNC: 3.5 MMOL/L (ref 3.5–5.2)
PROT SERPL-MCNC: 7 G/DL (ref 6–8.5)
PROT UR STRIP-MCNC: ABNORMAL MG/DL
RBC # BLD AUTO: 4.43 10*6/MM3 (ref 3.77–5.28)
RBC # UR STRIP: ABNORMAL /UL
SODIUM SERPL-SCNC: 145 MMOL/L (ref 136–145)
SP GR UR: 1.03 (ref 1–1.03)
UROBILINOGEN UR QL: NORMAL
WBC NRBC COR # BLD: 8.22 10*3/MM3 (ref 3.4–10.8)

## 2022-07-27 PROCEDURE — 81003 URINALYSIS AUTO W/O SCOPE: CPT | Performed by: NURSE PRACTITIONER

## 2022-07-27 PROCEDURE — 83880 ASSAY OF NATRIURETIC PEPTIDE: CPT | Performed by: NURSE PRACTITIONER

## 2022-07-27 PROCEDURE — 91305 COVID-19 (PFIZER) 12+ YRS: CPT | Performed by: NURSE PRACTITIONER

## 2022-07-27 PROCEDURE — 99213 OFFICE O/P EST LOW 20 MIN: CPT | Performed by: NURSE PRACTITIONER

## 2022-07-27 PROCEDURE — 1170F FXNL STATUS ASSESSED: CPT | Performed by: NURSE PRACTITIONER

## 2022-07-27 PROCEDURE — G0439 PPPS, SUBSEQ VISIT: HCPCS | Performed by: NURSE PRACTITIONER

## 2022-07-27 PROCEDURE — 0051A COVID-19 (PFIZER) 12+ YRS: CPT | Performed by: NURSE PRACTITIONER

## 2022-07-27 PROCEDURE — 36415 COLL VENOUS BLD VENIPUNCTURE: CPT | Performed by: NURSE PRACTITIONER

## 2022-07-27 PROCEDURE — 85025 COMPLETE CBC W/AUTO DIFF WBC: CPT | Performed by: NURSE PRACTITIONER

## 2022-07-27 PROCEDURE — 80053 COMPREHEN METABOLIC PANEL: CPT | Performed by: NURSE PRACTITIONER

## 2022-07-27 PROCEDURE — 1159F MED LIST DOCD IN RCRD: CPT | Performed by: NURSE PRACTITIONER

## 2022-07-27 PROCEDURE — 87086 URINE CULTURE/COLONY COUNT: CPT | Performed by: NURSE PRACTITIONER

## 2022-07-27 RX ORDER — METOPROLOL SUCCINATE 50 MG/1
50 TABLET, EXTENDED RELEASE ORAL DAILY
Qty: 30 TABLET | Refills: 5 | Status: SHIPPED | OUTPATIENT
Start: 2022-07-27 | End: 2022-08-23 | Stop reason: SDUPTHER

## 2022-07-27 RX ORDER — LOSARTAN POTASSIUM 50 MG/1
25 TABLET ORAL DAILY
Qty: 15 TABLET | Refills: 5 | Status: SHIPPED | OUTPATIENT
Start: 2022-07-27

## 2022-07-27 RX ORDER — HYDROXYZINE PAMOATE 25 MG/1
25 CAPSULE ORAL DAILY
Qty: 30 CAPSULE | Refills: 5 | Status: SHIPPED | OUTPATIENT
Start: 2022-07-27

## 2022-07-27 RX ORDER — DONEPEZIL HYDROCHLORIDE 5 MG/1
5 TABLET, FILM COATED ORAL
Qty: 30 TABLET | Refills: 5 | Status: SHIPPED | OUTPATIENT
Start: 2022-07-27

## 2022-07-27 RX ORDER — LORATADINE 10 MG/1
10 TABLET ORAL DAILY
Qty: 30 TABLET | Refills: 5 | Status: SHIPPED | OUTPATIENT
Start: 2022-07-27

## 2022-07-27 NOTE — PROGRESS NOTES
The ABCs of the Annual Wellness Visit  Subsequent Medicare Wellness Visit    Chief Complaint   Patient presents with   • Follow-up     States she fell back in May DX UTI   • Medicare Wellness-subsequent      Subjective    History of Present Illness:  Pati Leon is a 88 y.o. female who presents for a Subsequent Medicare Wellness Visit.    The following portions of the patient's history were reviewed and   updated as appropriate: allergies, current medications, past family history, past medical history, past social history, past surgical history and problem list.    Compared to one year ago, the patient feels her physical   health is worse.    Compared to one year ago, the patient feels her mental   health is worse.    Recent Hospitalizations:  She was not admitted to the hospital during the last year.       Current Medical Providers:  Patient Care Team:  Madeline Trammell APRN as PCP - General (Nurse Practitioner)  Shaina Lloyd APRN as Nurse Practitioner (Nurse Practitioner)    Outpatient Medications Prior to Visit   Medication Sig Dispense Refill   • acetaminophen (TYLENOL) 500 MG tablet acetaminophen 500 mg oral tablet take 1 ( 500 mg)  tablet every 6 hours while awake for pain   Active     • aspirin 81 MG EC tablet aspirin 81 mg oral tablet,delayed release (DR/EC) take 1 tablet (81 mg) by oral route once daily   Active     • Timolol Maleate, Once-Daily, 0.5 % solution 1 drop.     • travoprost, JUNE free, (TRAVATAN) 0.004 % solution ophthalmic solution 1 drop.     • donepezil (ARICEPT) 5 MG tablet TAKE ONE TABLET BY MOUTH EVERY NIGHT 30 tablet 0   • hydrOXYzine pamoate (VISTARIL) 25 MG capsule TAKE ONE CAPSULE BY MOUTH EVERY DAY 30 capsule 0   • loratadine (CLARITIN) 10 MG tablet Claritin 10 mg oral tablet take 1 tablet (10 mg) by oral route once daily   Suspended     • losartan (COZAAR) 50 MG tablet TAKE 1/2 TABLET BY MOUTH EVERY DAY 15 tablet 0   • metoprolol succinate XL (TOPROL-XL) 50 MG 24 hr  "tablet TAKE ONE TABLET BY MOUTH ONCE DAILY 90 tablet 0   • Cranberry 400 MG capsule cranberry 400 mg oral capsule take 1 capsule by oral route daily   Active     • nitrofurantoin, macrocrystal-monohydrate, (MACROBID) 100 MG capsule Take 1 capsule by mouth 2 (Two) Times a Day. 14 capsule 0   • QUEtiapine (SEROquel) 25 MG tablet TAKE ONE TABLET BY MOUTH EVERY NIGHT 30 tablet 0     No facility-administered medications prior to visit.       No opioid medication identified on active medication list. I have reviewed chart for other potential  high risk medication/s and harmful drug interactions in the elderly.          Aspirin is on active medication list. Aspirin use is indicated based on review of current medical condition/s. Pros and cons of this therapy have been discussed today. Benefits of this medication outweigh potential harm.  Patient has been encouraged to continue taking this medication.  .      Patient Active Problem List   Diagnosis   • Age-related macular degeneration   • Arthritis   • Cataract   • Dementia (HCC)   • Essential hypertension   • Hyperlipidemia   • Malignant melanoma of skin (HCC)   • Encounter for therapeutic drug monitoring   • Tricuspid regurgitation   • Chronic UTI (urinary tract infection)   • Lethargic     Advance Care Planning  Advance Directive is on file.  ACP discussion was declined by the patient. Patient has an advance directive in EMR which is still valid.           Objective    Vitals:    07/27/22 1403   BP: 140/78   Pulse: 75   Resp: 20   Temp: 96.1 °F (35.6 °C)   SpO2: 96%   Weight: 58.3 kg (128 lb 9.6 oz)   Height: 165.1 cm (65\")     Estimated body mass index is 21.4 kg/m² as calculated from the following:    Height as of this encounter: 165.1 cm (65\").    Weight as of this encounter: 58.3 kg (128 lb 9.6 oz).    BMI is within normal parameters. No other follow-up for BMI required.      Does the patient have evidence of cognitive impairment? Yes    Physical Exam      "       HEALTH RISK ASSESSMENT    Smoking Status:  Social History     Tobacco Use   Smoking Status Never Smoker   Smokeless Tobacco Never Used     Alcohol Consumption:  Social History     Substance and Sexual Activity   Alcohol Use Never     Fall Risk Screen:    STEADI Fall Risk Assessment was completed, and patient is at MODERATE risk for falls. Assessment completed on:7/27/2022    Depression Screening:  PHQ-2/PHQ-9 Depression Screening 7/27/2022   Retired PHQ-9 Total Score -   Retired Total Score -   Little Interest or Pleasure in Doing Things 0-->not at all   Feeling Down, Depressed or Hopeless 0-->not at all   PHQ-9: Brief Depression Severity Measure Score 0       Health Habits and Functional and Cognitive Screening:  Functional & Cognitive Status 7/27/2022   Do you have difficulty preparing food and eating? Yes   Do you have difficulty bathing yourself, getting dressed or grooming yourself? Yes   Do you have difficulty using the toilet? Yes   Do you have difficulty moving around from place to place? Yes   Do you have trouble with steps or getting out of a bed or a chair? Yes   Current Diet Well Balanced Diet        Current Diet Comment daughter states she is eating better   Dental Exam Not up to date   Eye Exam Not up to date   Exercise (times per week) 0 times per week   Current Exercises Include No Regular Exercise   Do you need help using the phone?  Yes   Are you deaf or do you have serious difficulty hearing?  Yes   Do you need help with transportation? Yes   Do you need help shopping? Yes   Do you need help preparing meals?  Yes   Do you need help with housework?  Yes   Do you need help with laundry? Yes   Do you need help taking your medications? Yes   Do you need help managing money? Yes   Do you ever drive or ride in a car without wearing a seat belt? No   Have you felt unusual stress, anger or loneliness in the last month? No   Who do you live with? Child   If you need help, do you have trouble finding  someone available to you? No   Do you have difficulty concentrating, remembering or making decisions? Yes       Age-appropriate Screening Schedule:  Refer to the list below for future screening recommendations based on patient's age, sex and/or medical conditions. Orders for these recommended tests are listed in the plan section. The patient has been provided with a written plan.    Health Maintenance   Topic Date Due   • LIPID PANEL  07/01/2021   • TDAP/TD VACCINES (1 - Tdap) 06/13/2023 (Originally 8/13/1952)   • DXA SCAN  07/11/2023 (Originally 8/13/1933)   • ZOSTER VACCINE (1 of 2) 07/27/2023 (Originally 8/13/1983)   • INFLUENZA VACCINE  10/01/2022              Assessment & Plan   CMS Preventative Services Quick Reference  Risk Factors Identified During Encounter  Cardiovascular Disease  Dementia/Memory   Depression/Dysphoria  Fall Risk-High or Moderate  Hearing Problem  The above risks/problems have been discussed with the patient.  Follow up actions/plans if indicated are seen below in the Assessment/Plan Section.  Pertinent information has been shared with the patient in the After Visit Summary.    Diagnoses and all orders for this visit:    1. Medicare annual wellness visit, subsequent (Primary)    2. COVID-19 vaccine administered  -     COVID-19 Vaccine (Pfizer) Gray Cap    3. Weakness  -     POCT urinalysis dipstick, automated  -     Urine Culture - Urine, Urine, Clean Catch    4. History of UTI  -     POCT urinalysis dipstick, automated  -     Urine Culture - Urine, Urine, Clean Catch    5. Fall, subsequent encounter    6. Essential hypertension  -     metoprolol succinate XL (TOPROL-XL) 50 MG 24 hr tablet; Take 1 tablet by mouth Daily.  Dispense: 30 tablet; Refill: 5  -     losartan (COZAAR) 50 MG tablet; Take 0.5 tablets by mouth Daily.  Dispense: 15 tablet; Refill: 5  -     CBC Auto Differential  -     Comprehensive Metabolic Panel    7. Mixed hyperlipidemia    8. Other frontotemporal dementia without  behavioral disturbance (HCC)  -     donepezil (ARICEPT) 5 MG tablet; Take 1 tablet by mouth every night at bedtime.  Dispense: 30 tablet; Refill: 5    9. Itching  -     hydrOXYzine pamoate (VISTARIL) 25 MG capsule; Take 1 capsule by mouth Daily.  Dispense: 30 capsule; Refill: 5    10. Seasonal allergic rhinitis due to other allergic trigger  -     loratadine (CLARITIN) 10 MG tablet; Take 1 tablet by mouth Daily.  Dispense: 30 tablet; Refill: 5    11. Shortness of breath  -     BNP ([facility] Only)        Follow Up:   Return in about 6 months (around 1/27/2023).     An After Visit Summary and PPPS were made available to the patient.                   She is doing well with her blood pressure, cholesterol medicine.  Patient is having urinary symptoms with confusion and weakness and her daughter would like to have her urine checked today.  She was in the hospital back in May just for an ER visit for a fall at home and it was due to a UTI.  When she was on medications daily for the UTI as the symptoms did get better.  She feels that she is gotten worse and her declining health of her dementia and weakness.      We will follow-up 6 months as it is hard for her to get her mom out of the house and ready.  We will check urine today and send it for culture.  If culture comes back positive then we will treat with daily medicine for 6 months low-dose Keflex or amoxicillin as she cannot tolerate Bactrim daily.  I did offer physical therapy/home health to come visit patient to help strengthen her legs and to help with dietary changes.  Her daughter has been doing finger foods.  She is doing well and tolerating those with a boost on a daily basis.

## 2022-07-28 LAB — BACTERIA SPEC AEROBE CULT: NORMAL

## 2022-08-04 ENCOUNTER — TELEPHONE (OUTPATIENT)
Dept: FAMILY MEDICINE CLINIC | Facility: CLINIC | Age: 87
End: 2022-08-04
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
## 2022-08-04 DIAGNOSIS — G31.09 OTHER FRONTOTEMPORAL DEMENTIA WITHOUT BEHAVIORAL DISTURBANCE: ICD-10-CM

## 2022-08-04 DIAGNOSIS — F02.80 OTHER FRONTOTEMPORAL DEMENTIA WITHOUT BEHAVIORAL DISTURBANCE: ICD-10-CM

## 2022-08-04 DIAGNOSIS — N39.0 CHRONIC UTI (URINARY TRACT INFECTION): Primary | ICD-10-CM

## 2022-08-04 DIAGNOSIS — R53.1 WEAKNESS: ICD-10-CM

## 2022-08-04 NOTE — TELEPHONE ENCOUNTER
Caller: RENE RAZO    Relationship to patient: Emergency Contact    Best call back number: [phone]    Patient is needing: PATIENT'S DAUGHTER CALLED IN AND SAID THAT PATIENT IS INTERESTED IN HAVING HOME HEALTH FOR PATIENT. SHE SAID THAT SINCE PATIENT HAS FALLEN SHE HAS BEEN HAVING ISSUES. DAUGHTER STATES IN THE PAST SHE HAS USED [facility]. PLEASE CALL AND ADVISE.

## 2022-08-11 RX ORDER — NITROFURANTOIN 25; 75 MG/1; MG/1
100 CAPSULE ORAL 2 TIMES DAILY
Qty: 14 CAPSULE | Refills: 0 | Status: SHIPPED | OUTPATIENT
Start: 2022-08-11 | End: 2022-08-18

## 2022-08-19 ENCOUNTER — APPOINTMENT (OUTPATIENT)
Dept: CT IMAGING | Facility: HOSPITAL | Age: 87
End: 2022-08-19

## 2022-08-19 ENCOUNTER — APPOINTMENT (OUTPATIENT)
Dept: GENERAL RADIOLOGY | Facility: HOSPITAL | Age: 87
End: 2022-08-19

## 2022-08-19 ENCOUNTER — HOSPITAL ENCOUNTER (EMERGENCY)
Facility: HOSPITAL | Age: 87
Discharge: HOME | End: 2022-08-19
Attending: EMERGENCY MEDICINE | Admitting: EMERGENCY MEDICINE

## 2022-08-19 VITALS
TEMPERATURE: 98.9 F | HEART RATE: 103 BPM | DIASTOLIC BLOOD PRESSURE: 86 MMHG | HEIGHT: 65 IN | BODY MASS INDEX: 21.78 KG/M2 | SYSTOLIC BLOOD PRESSURE: 162 MMHG | OXYGEN SATURATION: 93 % | WEIGHT: 130.73 LBS | RESPIRATION RATE: 16 BRPM

## 2022-08-19 DIAGNOSIS — R00.2 PALPITATIONS: Primary | ICD-10-CM

## 2022-08-19 LAB
ALBUMIN SERPL-MCNC: 3.3 G/DL (ref 3.5–5.2)
ALBUMIN/GLOB SERPL: 0.8 G/DL
ALP SERPL-CCNC: 1036 U/L (ref 39–117)
ALT SERPL W P-5'-P-CCNC: 262 U/L (ref 1–33)
ANION GAP SERPL CALCULATED.3IONS-SCNC: 11.2 MMOL/L (ref 5–15)
AST SERPL-CCNC: 175 U/L (ref 1–32)
BACTERIA UR QL AUTO: ABNORMAL /HPF
BASOPHILS # BLD AUTO: 0.03 10*3/MM3 (ref 0–0.2)
BASOPHILS NFR BLD AUTO: 0.3 % (ref 0–1.5)
BILIRUB SERPL-MCNC: 0.7 MG/DL (ref 0–1.2)
BILIRUB UR QL STRIP: ABNORMAL
BUN SERPL-MCNC: 22 MG/DL (ref 8–23)
BUN/CREAT SERPL: 30.6 (ref 7–25)
CALCIUM SPEC-SCNC: 9.4 MG/DL (ref 8.6–10.5)
CHLORIDE SERPL-SCNC: 102 MMOL/L (ref 98–107)
CLARITY UR: CLEAR
CO2 SERPL-SCNC: 28.8 MMOL/L (ref 22–29)
COLOR UR: ABNORMAL
CREAT SERPL-MCNC: 0.72 MG/DL (ref 0.57–1)
DEPRECATED RDW RBC AUTO: 52.3 FL (ref 37–54)
EGFRCR SERPLBLD CKD-EPI 2021: 80 ML/MIN/1.73
EOSINOPHIL # BLD AUTO: 0.27 10*3/MM3 (ref 0–0.4)
EOSINOPHIL NFR BLD AUTO: 2.8 % (ref 0.3–6.2)
ERYTHROCYTE [DISTWIDTH] IN BLOOD BY AUTOMATED COUNT: 15.6 % (ref 12.3–15.4)
GLOBULIN UR ELPH-MCNC: 3.9 GM/DL
GLUCOSE SERPL-MCNC: 129 MG/DL (ref 65–99)
GLUCOSE UR STRIP-MCNC: NEGATIVE MG/DL
HCT VFR BLD AUTO: 36.1 % (ref 34–46.6)
HGB BLD-MCNC: 11.9 G/DL (ref 12–15.9)
HGB UR QL STRIP.AUTO: NEGATIVE
HOLD SPECIMEN: NORMAL
HOLD SPECIMEN: NORMAL
HYALINE CASTS UR QL AUTO: ABNORMAL /LPF
IMM GRANULOCYTES # BLD AUTO: 0.13 10*3/MM3 (ref 0–0.05)
IMM GRANULOCYTES NFR BLD AUTO: 1.4 % (ref 0–0.5)
KETONES UR QL STRIP: NEGATIVE
LEUKOCYTE ESTERASE UR QL STRIP.AUTO: ABNORMAL
LYMPHOCYTES # BLD AUTO: 1.76 10*3/MM3 (ref 0.7–3.1)
LYMPHOCYTES NFR BLD AUTO: 18.5 % (ref 19.6–45.3)
MAGNESIUM SERPL-MCNC: 2.2 MG/DL (ref 1.6–2.4)
MCH RBC QN AUTO: 30.4 PG (ref 26.6–33)
MCHC RBC AUTO-ENTMCNC: 33 G/DL (ref 31.5–35.7)
MCV RBC AUTO: 92.1 FL (ref 79–97)
MONOCYTES # BLD AUTO: 0.97 10*3/MM3 (ref 0.1–0.9)
MONOCYTES NFR BLD AUTO: 10.2 % (ref 5–12)
NEUTROPHILS NFR BLD AUTO: 6.35 10*3/MM3 (ref 1.7–7)
NEUTROPHILS NFR BLD AUTO: 66.8 % (ref 42.7–76)
NITRITE UR QL STRIP: NEGATIVE
NRBC BLD AUTO-RTO: 0 /100 WBC (ref 0–0.2)
PH UR STRIP.AUTO: 6 [PH] (ref 5–8)
PLATELET # BLD AUTO: 226 10*3/MM3 (ref 140–450)
PMV BLD AUTO: 10.6 FL (ref 6–12)
POTASSIUM SERPL-SCNC: 3.1 MMOL/L (ref 3.5–5.2)
PROT SERPL-MCNC: 7.2 G/DL (ref 6–8.5)
PROT UR QL STRIP: ABNORMAL
QT INTERVAL: 380 MS
RBC # BLD AUTO: 3.92 10*6/MM3 (ref 3.77–5.28)
RBC # UR STRIP: ABNORMAL /HPF
REF LAB TEST METHOD: ABNORMAL
SODIUM SERPL-SCNC: 142 MMOL/L (ref 136–145)
SP GR UR STRIP: >1.03 (ref 1–1.03)
SQUAMOUS #/AREA URNS HPF: ABNORMAL /HPF
UROBILINOGEN UR QL STRIP: ABNORMAL
WBC # UR STRIP: ABNORMAL /HPF
WBC NRBC COR # BLD: 9.51 10*3/MM3 (ref 3.4–10.8)
WHOLE BLOOD HOLD COAG: NORMAL
WHOLE BLOOD HOLD SPECIMEN: NORMAL

## 2022-08-19 PROCEDURE — 81001 URINALYSIS AUTO W/SCOPE: CPT | Performed by: EMERGENCY MEDICINE

## 2022-08-19 PROCEDURE — 85025 COMPLETE CBC W/AUTO DIFF WBC: CPT | Performed by: EMERGENCY MEDICINE

## 2022-08-19 PROCEDURE — 0 IOPAMIDOL PER 1 ML: Performed by: EMERGENCY MEDICINE

## 2022-08-19 PROCEDURE — 99283 EMERGENCY DEPT VISIT LOW MDM: CPT

## 2022-08-19 PROCEDURE — 93005 ELECTROCARDIOGRAM TRACING: CPT | Performed by: EMERGENCY MEDICINE

## 2022-08-19 PROCEDURE — 80053 COMPREHEN METABOLIC PANEL: CPT | Performed by: EMERGENCY MEDICINE

## 2022-08-19 PROCEDURE — 71260 CT THORAX DX C+: CPT

## 2022-08-19 PROCEDURE — 93010 ELECTROCARDIOGRAM REPORT: CPT | Performed by: INTERNAL MEDICINE

## 2022-08-19 PROCEDURE — 74177 CT ABD & PELVIS W/CONTRAST: CPT

## 2022-08-19 PROCEDURE — 71045 X-RAY EXAM CHEST 1 VIEW: CPT

## 2022-08-19 PROCEDURE — 83735 ASSAY OF MAGNESIUM: CPT | Performed by: EMERGENCY MEDICINE

## 2022-08-19 RX ORDER — METOPROLOL TARTRATE 50 MG/1
50 TABLET, FILM COATED ORAL ONCE
Status: COMPLETED | OUTPATIENT
Start: 2022-08-19 | End: 2022-08-19

## 2022-08-19 RX ADMIN — IOPAMIDOL 100 ML: 755 INJECTION, SOLUTION INTRAVENOUS at 16:08

## 2022-08-19 RX ADMIN — METOPROLOL TARTRATE 50 MG: 50 TABLET, FILM COATED ORAL at 20:20

## 2022-08-19 NOTE — ED NOTES
"During straight cath, chaperoned by ARAVIND Harrison: this RN notes redness/dark discoloration/bruise like areas to groin, labia, clitoral area, vaginal area. PEDRO Shultz to bedside for secondary evaluation. Daughter, Yenni, at bedside denies knowledge of recent injury. Reports \"could it be from her wiping too hard?\" Daughter reports recent treatment for UTI. Per daughter: patient lives in garage apartment at her (daughter's) home, alone, with daytime caregivers. Pericare completed. Fresh depends placed. Dr. Saeed informed of situation. SANSAMANTHA paged at this time.   "

## 2022-08-19 NOTE — ED PROVIDER NOTES
Time: 2:19 PM EDT  Arrived by: ambulance  Chief Complaint: rapid heart rate  History provided by: home health nurse  History is limited by: N/A     History of Present Illness:  Patient is a 89 y.o. year old female who presents to the emergency department with rapid heart rate.     Patient is accompanied today by her home health nurse, who served as her primary historian. Patient had an episode at home just prior to EMS being called where her heart rate monitor was showing a heart rate in the 140s. During the episode, she was having labored breathing. EMS was called by the home health nurse. This lasted a few minutes, but resolved before EMS arrived. Her breathing became normal thereafter. The nurse states she has had one more episode of elevated heart rate just after arriving. She has a past medical history of sinus arrythmia, and had a cardiologist in the past, but does not currently. She finished a course of Macrobid yesterday for a UTI.           Similar Symptoms Previously: No  Recently seen: No      Patient Care Team  Primary Care Provider: Madeline Trammell APRN    Past Medical History:     Allergies   Allergen Reactions   • Sulfa Antibiotics Unknown - Low Severity   • Keflex [Cephalexin] Rash     Past Medical History:   Diagnosis Date   • Age-related macular degeneration 2018   • Arthritis    • Cataract    • Dementia (HCC)    • Essential hypertension 2018   • Hemorrhoids    • Hip fracture (HCC) 2019    Right, Dr. Orta   • HLD (hyperlipidemia) 2018   • Macular degeneration    • Malignant melanoma of skin (HCC)    • Palpitations    • Tricuspid regurgitation      Past Surgical History:   Procedure Laterality Date   •  SECTION     • HIP FRACTURE SURGERY Right 2019    Dr. Orta   • OTHER SURGICAL HISTORY      Joint Surgery   • SKIN CANCER EXCISION      Melanoma     Family History   Problem Relation Age of Onset   • Stroke Mother    • Heart disease Father    •  Heart disease Sister    • Heart disease Brother        Home Medications:  Prior to Admission medications    Medication Sig Start Date End Date Taking? Authorizing Provider   acetaminophen (TYLENOL) 500 MG tablet acetaminophen 500 mg oral tablet take 1 ( 500 mg)  tablet every 6 hours while awake for pain   Active    Julian Riley MD   aspirin 81 MG EC tablet aspirin 81 mg oral tablet,delayed release (DR/EC) take 1 tablet (81 mg) by oral route once daily   Active    Julian Riley MD   donepezil (ARICEPT) 5 MG tablet Take 1 tablet by mouth every night at bedtime. 7/27/22   Madeline Trammell APRN   hydrOXYzine pamoate (VISTARIL) 25 MG capsule Take 1 capsule by mouth Daily. 7/27/22   Madeline Trammell APRN   loratadine (CLARITIN) 10 MG tablet Take 1 tablet by mouth Daily. 7/27/22   Madeline Trammell APRN   losartan (COZAAR) 50 MG tablet Take 0.5 tablets by mouth Daily. 7/27/22   Madeline Trammell APRN   metoprolol succinate XL (TOPROL-XL) 50 MG 24 hr tablet Take 1 tablet by mouth Daily. 7/27/22   Madeline Trammell APRN   nitrofurantoin, macrocrystal-monohydrate, (Macrobid) 100 MG capsule Take 1 capsule by mouth 2 (Two) Times a Day for 7 days. 8/11/22 8/18/22  Madeline Trammell APRN   Timolol Maleate, Once-Daily, 0.5 % solution 1 drop.    ProviderJulian MD   travoprost, JUNE free, (TRAVATAN) 0.004 % solution ophthalmic solution 1 drop.    ProviderJulian MD        Social History:   Social History     Tobacco Use   • Smoking status: Never Smoker   • Smokeless tobacco: Never Used   Vaping Use   • Vaping Use: Never used   Substance Use Topics   • Alcohol use: Never   • Drug use: Never     Recent travel: no     Review of Systems:  Review of Systems   Constitutional: Negative for chills and fever.   HENT: Negative for congestion, rhinorrhea and sore throat.    Eyes: Negative for pain and visual disturbance.   Respiratory: Positive for shortness of breath. Negative for  "apnea, cough and chest tightness.    Cardiovascular: Negative for chest pain and palpitations.   Gastrointestinal: Negative for abdominal pain, diarrhea, nausea and vomiting.   Genitourinary: Negative for difficulty urinating and dysuria.   Musculoskeletal: Negative for joint swelling and myalgias.   Skin: Negative for color change.   Neurological: Negative for seizures and headaches.   Psychiatric/Behavioral: Negative.    All other systems reviewed and are negative.       Physical Exam:  BP (!) 144/113   Pulse (!) 135   Temp 98.9 °F (37.2 °C) (Oral)   Resp 16   Ht 165.1 cm (65\")   Wt 59.3 kg (130 lb 11.7 oz)   LMP  (LMP Unknown)   SpO2 95%   BMI 21.76 kg/m²     Physical Exam  Vitals and nursing note reviewed.   Constitutional:       General: She is not in acute distress.     Appearance: Normal appearance. She is not toxic-appearing.   HENT:      Head: Normocephalic and atraumatic.      Jaw: There is normal jaw occlusion.   Eyes:      General: Lids are normal.      Extraocular Movements: Extraocular movements intact.      Conjunctiva/sclera: Conjunctivae normal.      Pupils: Pupils are equal, round, and reactive to light.   Cardiovascular:      Rate and Rhythm: Regular rhythm. Tachycardia present.      Pulses: Normal pulses.      Heart sounds: Normal heart sounds.   Pulmonary:      Effort: Pulmonary effort is normal. No respiratory distress.      Breath sounds: Normal breath sounds. No wheezing or rhonchi.   Abdominal:      General: Abdomen is flat.      Palpations: Abdomen is soft.      Tenderness: There is abdominal tenderness in the suprapubic area. There is no guarding or rebound.   Musculoskeletal:         General: Normal range of motion.      Cervical back: Normal range of motion and neck supple.      Right lower leg: No edema.      Left lower leg: No edema.   Skin:     General: Skin is warm and dry.   Neurological:      Mental Status: She is alert and oriented to person, place, and time. Mental status " is at baseline.   Psychiatric:         Mood and Affect: Mood normal.                Medications in the Emergency Department:  Medications   iopamidol (ISOVUE-370) 76 % injection 100 mL (100 mL Intravenous Given 8/19/22 1608)        Labs  Lab Results (last 24 hours)     Procedure Component Value Units Date/Time    CBC & Differential [[id number]]  (Abnormal) Collected: 08/19/22 1359    Specimen: Blood Updated: 08/19/22 1408    Narrative:      The following orders were created for panel order CBC & Differential.  Procedure                               Abnormality         Status                     ---------                               -----------         ------                     CBC Auto Differential[[id number]]        Abnormal            Final result                 Please view results for these tests on the individual orders.    Comprehensive Metabolic Panel [[id number]]  (Abnormal) Collected: 08/19/22 1359    Specimen: Blood Updated: 08/19/22 1426     Glucose 129 mg/dL      BUN 22 mg/dL      Creatinine 0.72 mg/dL      Sodium 142 mmol/L      Potassium 3.1 mmol/L      Comment: Slight hemolysis detected by analyzer. Results may be affected.        Chloride 102 mmol/L      CO2 28.8 mmol/L      Calcium 9.4 mg/dL      Total Protein 7.2 g/dL      Albumin 3.30 g/dL      ALT (SGPT) 262 U/L      AST (SGOT) 175 U/L      Comment: Slight hemolysis detected by analyzer. Results may be affected.        Alkaline Phosphatase 1,036 U/L      Total Bilirubin 0.7 mg/dL      Globulin 3.9 gm/dL      A/G Ratio 0.8 g/dL      BUN/Creatinine Ratio 30.6     Anion Gap 11.2 mmol/L      eGFR 80.0 mL/min/1.73      Comment: National Kidney Foundation and American Society of Nephrology (ASN) Task Force recommended calculation based on the Chronic Kidney Disease Epidemiology Collaboration (CKD-EPI) equation refit without adjustment for race.       Narrative:      GFR Normal >60  Chronic Kidney Disease <60  Kidney Failure <15      CBC Auto  Differential [[id number]]  (Abnormal) Collected: 08/19/22 1359    Specimen: Blood Updated: 08/19/22 1408     WBC 9.51 10*3/mm3      RBC 3.92 10*6/mm3      Hemoglobin 11.9 g/dL      Hematocrit 36.1 %      MCV 92.1 fL      MCH 30.4 pg      MCHC 33.0 g/dL      RDW 15.6 %      RDW-SD 52.3 fl      MPV 10.6 fL      Platelets 226 10*3/mm3      Neutrophil % 66.8 %      Lymphocyte % 18.5 %      Monocyte % 10.2 %      Eosinophil % 2.8 %      Basophil % 0.3 %      Immature Grans % 1.4 %      Neutrophils, Absolute 6.35 10*3/mm3      Lymphocytes, Absolute 1.76 10*3/mm3      Monocytes, Absolute 0.97 10*3/mm3      Eosinophils, Absolute 0.27 10*3/mm3      Basophils, Absolute 0.03 10*3/mm3      Immature Grans, Absolute 0.13 10*3/mm3      nRBC 0.0 /100 WBC     Magnesium [[id number]]  (Normal) Collected: 08/19/22 1359    Specimen: Blood Updated: 08/19/22 1437     Magnesium 2.2 mg/dL     Urinalysis With Culture If Indicated - Urine, Clean Catch [[id number]]  (Abnormal) Collected: 08/19/22 1636    Specimen: Urine, Clean Catch Updated: 08/19/22 1707     Color, UA Dark Yellow     Appearance, UA Clear     pH, UA 6.0     Specific Gravity, UA >1.030     Glucose, UA Negative     Ketones, UA Negative     Bilirubin, UA Small (1+)     Blood, UA Negative     Protein, UA 30 mg/dL (1+)     Leuk Esterase, UA Trace     Nitrite, UA Negative     Urobilinogen, UA 1.0 E.U./dL    Narrative:      In absence of clinical symptoms, the presence of pyuria, bacteria, and/or nitrites on the urinalysis result does not correlate with infection.    Urinalysis, Microscopic Only - Urine, Clean Catch [[id number]]  (Abnormal) Collected: 08/19/22 1636    Specimen: Urine, Clean Catch Updated: 08/19/22 1707     RBC, UA 3-5 /HPF      WBC, UA 0-2 /HPF      Comment: Urine culture not indicated.        Bacteria, UA 1+ /HPF      Squamous Epithelial Cells, UA 0-2 /HPF      Hyaline Casts, UA 3-6 /LPF      Methodology Automated Microscopy           Imaging:  CT Chest With Contrast  Diagnostic    Result Date: 8/19/2022  PROCEDURE: CT CHEST W CONTRAST DIAGNOSTIC  COMPARISON:  None INDICATIONS: pneumonia vs edema, TACHYCARDIA  TECHNIQUE: After obtaining the patient's consent, CT images were obtained with non-ionic intravenous contrast material.   PROTOCOL:   Standard imaging protocol performed    RADIATION:   DLP: 221.8mGy*cm   Automated exposure control was utilized to minimize radiation dose. CONTRAST: 100cc Isovue 370 I.V.  FINDINGS:  There is mild cardiomegaly.  There is a moderate pericardial effusion.  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  There is no evidence of aortic dissection.  The central pulmonary arteries appear patent.  There is no mediastinal, hilar, or axillary adenopathy.  There are no pleural effusions.  There are calcified granulomas within the anterior right middle lobe.  There is minimal linear atelectasis or scarring within the left upper lobe.  There also areas of subpleural airspace consolidation within the posterior lateral left lower lobe most likely due to atelectasis or scarring.  Superimposed pneumonia can not be entirely excluded.  Follow-up chest CT could be performed in 4-6 weeks to evaluate for resolution.  Bilateral adrenal glands are within normal limits.  Other visualized portions of the upper abdomen are unremarkable.  There is osteopenia and degenerative change of the spine.  No lytic or sclerotic bony lesions are seen.        1. Mild cardiomegaly with moderate-sized pericardial effusion 2. Mild fusiform ectasia of ascending thoracic aorta measuring up to 3.8 cm in size.  No evidence of aortic dissection 3. No large central pulmonary emboli 4. Left lower lobe airspace disease favored to be due to atelectasis or scarring.  Superimposed pneumonia cannot be entirely excluded.  Follow-up CT scan of the chest could be performed in 4-6 weeks to evaluate for resolution.     CODY RM MD        Electronically Signed and Approved By: CODY RM MD on 8/19/2022 at 16:32             CT Abdomen Pelvis With Contrast    Result Date: 8/19/2022  PROCEDURE: CT ABDOMEN PELVIS W CONTRAST  COMPARISON: None  INDICATIONS: transaminitis, severe alk phos elevation, TACHYCARDIA  TECHNIQUE: After obtaining the patient's consent, CT images were created with non-ionic intravenous contrast material.   PROTOCOL:   Standard imaging protocol performed    RADIATION:   DLP: 461.6mGy*cm   Automated exposure control was utilized to minimize radiation dose. CONTRAST: 100cc Isovue 370 I.V.  FINDINGS:  There is left lower lobe airspace disease which may be due to atelectasis or scarring.  Superimposed pneumonia cannot be entirely excluded.  Follow-up CT scan of the chest could be performed in 4-6 weeks to evaluate for resolution.  There is mild cardiomegaly.  There is a moderate-sized pericardial effusion.  The liver, pancreas, and spleen are within normal limits.  The bilateral adrenal glands appear normal.  Kidneys appear within normal limits bilaterally.  No evidence of hydronephrosis.  There is no abdominal or retroperitoneal adenopathy.  Gallbladder is normal.  The upper GI tract is within normal limits.  Pelvis:  Portions of the pelvis are obscured by beam hardening artifact related to total right hip arthroplasty.  Urinary bladder appears within normal limits.  Visualized portions of the GI tract are within normal limits.  There is no pelvic or inguinal adenopathy.  The uterus and ovaries appear within normal limits.  No free intraperitoneal fluid.  There are mild degenerative changes of the left hip.  There is diffuse osteopenia.  There are degenerative changes of the lower lumbar spine.  No lytic or sclerotic bony lesion identified.        1. Mild cardiomegaly with moderate pericardial effusion 2. Left lower lobe airspace disease which may be due to atelectasis or scarring.  Pneumonia cannot be entirely excluded.  Follow-up  chest CT could be performed in 4-6 weeks to evaluate for resolution 3. No acute process seen within the abdomen or pelvis.     CODY RM MD       Electronically Signed and Approved By: CODY RM MD on 8/19/2022 at 16:38             XR Chest 1 View    Result Date: 8/19/2022  PROCEDURE: XR CHEST 1 VW  COMPARISON: 05/14/2022  INDICATIONS: Palpitations  FINDINGS:  There is cardiomegaly.  There is hazy mixed interstitial and alveolar opacities in the perihilar and basilar regions, left greater than right.  This may be due to pulmonary edema or atypical infection pattern.  No pneumothorax is seen.  There is a probable skin fold over the right upper lung.  Pneumothorax felt less likely, as there are pulmonary markings extending to the apex.  There are surgical clips in the axillae bilaterally.         1. Cardiomegaly with bilateral perihilar and basilar mixed interstitial alveolar opacities, left greater than right.  This may be due to pulmonary edema or atypical infection pattern.  Correlate clinically.        ZORAN WELDON MD       Electronically Signed and Approved By: ZORAN WELDON MD on 8/19/2022 at 15:02               Procedures:  Procedures    Progress                            Medical Decision Making:  MDM  Number of Diagnoses or Management Options  Palpitations  Diagnosis management comments: In summary this is an 89-year-old female who presents to the emergency department for evaluation of intermittent elevated heart rate.  Patient has dementia and gives no additional details.  Daughter is at bedside states home health nurse today noticed elevated heart rate.  On initial evaluation patient's heart rate is normal range.  She does intermittently have episodes of 130 bpm sinus heart rate that resolved without intervention.  Work-up including chest x-ray, CT chest and pelvis, CBC, CMP, urinalysis reveal transaminitis and elevated alk phos level no other immediate abnormalities identified.  Patient was noted by  nursing staff to have genital ecchymosis during straight cath and SANE has examined the patient and cleared her for discharge home with no concerns.  Very strict return to ER and follow-up instructions have been provided to the patient.         Final diagnoses:   Palpitations        Disposition:  ED Disposition     ED Disposition   Discharge    Condition   Stable    Comment   --             This medical record created using voice recognition software.    Documentation assistance provided by Samuel Saeed MD acting as scribe for Samuel Saeed MD.         Elda Clarke  08/19/22 [id number]       Samuel Saeed MD  08/19/22 4068

## 2022-08-20 LAB — QT INTERVAL: 349 MS

## 2022-08-23 ENCOUNTER — OFFICE VISIT (OUTPATIENT)
Dept: FAMILY MEDICINE CLINIC | Facility: CLINIC | Age: 87
End: 2022-08-23

## 2022-08-23 VITALS
HEIGHT: 65 IN | DIASTOLIC BLOOD PRESSURE: 64 MMHG | WEIGHT: 130 LBS | SYSTOLIC BLOOD PRESSURE: 120 MMHG | TEMPERATURE: 97.2 F | HEART RATE: 97 BPM | BODY MASS INDEX: 21.66 KG/M2 | RESPIRATION RATE: 16 BRPM | OXYGEN SATURATION: 95 %

## 2022-08-23 DIAGNOSIS — F02.80 OTHER FRONTOTEMPORAL DEMENTIA WITHOUT BEHAVIORAL DISTURBANCE: ICD-10-CM

## 2022-08-23 DIAGNOSIS — R74.8 ELEVATED ALKALINE PHOSPHATASE LEVEL: ICD-10-CM

## 2022-08-23 DIAGNOSIS — R93.5 ABNORMAL CT OF THE ABDOMEN: ICD-10-CM

## 2022-08-23 DIAGNOSIS — I10 ESSENTIAL HYPERTENSION: ICD-10-CM

## 2022-08-23 DIAGNOSIS — R00.2 PALPITATION: Primary | ICD-10-CM

## 2022-08-23 DIAGNOSIS — G31.09 OTHER FRONTOTEMPORAL DEMENTIA WITHOUT BEHAVIORAL DISTURBANCE: ICD-10-CM

## 2022-08-23 LAB
ALBUMIN SERPL-MCNC: 3.1 G/DL (ref 3.5–5.2)
ALBUMIN/GLOB SERPL: 0.9 G/DL
ALP SERPL-CCNC: 768 U/L (ref 39–117)
ALT SERPL W P-5'-P-CCNC: 97 U/L (ref 1–33)
ANION GAP SERPL CALCULATED.3IONS-SCNC: 14.2 MMOL/L (ref 5–15)
AST SERPL-CCNC: 47 U/L (ref 1–32)
BASOPHILS # BLD AUTO: 0.04 10*3/MM3 (ref 0–0.2)
BASOPHILS NFR BLD AUTO: 0.4 % (ref 0–1.5)
BILIRUB SERPL-MCNC: 0.3 MG/DL (ref 0–1.2)
BUN SERPL-MCNC: 18 MG/DL (ref 8–23)
BUN/CREAT SERPL: 26.1 (ref 7–25)
CALCIUM SPEC-SCNC: 9.1 MG/DL (ref 8.6–10.5)
CHLORIDE SERPL-SCNC: 99 MMOL/L (ref 98–107)
CO2 SERPL-SCNC: 28.8 MMOL/L (ref 22–29)
CREAT SERPL-MCNC: 0.69 MG/DL (ref 0.57–1)
DEPRECATED RDW RBC AUTO: 46.8 FL (ref 37–54)
EGFRCR SERPLBLD CKD-EPI 2021: 83.1 ML/MIN/1.73
EOSINOPHIL # BLD AUTO: 0.2 10*3/MM3 (ref 0–0.4)
EOSINOPHIL NFR BLD AUTO: 2 % (ref 0.3–6.2)
ERYTHROCYTE [DISTWIDTH] IN BLOOD BY AUTOMATED COUNT: 14 % (ref 12.3–15.4)
GLOBULIN UR ELPH-MCNC: 3.6 GM/DL
GLUCOSE SERPL-MCNC: 115 MG/DL (ref 65–99)
HCT VFR BLD AUTO: 38.5 % (ref 34–46.6)
HGB BLD-MCNC: 12.5 G/DL (ref 12–15.9)
IMM GRANULOCYTES # BLD AUTO: 0.13 10*3/MM3 (ref 0–0.05)
IMM GRANULOCYTES NFR BLD AUTO: 1.3 % (ref 0–0.5)
LYMPHOCYTES # BLD AUTO: 1.61 10*3/MM3 (ref 0.7–3.1)
LYMPHOCYTES NFR BLD AUTO: 15.9 % (ref 19.6–45.3)
MCH RBC QN AUTO: 29.8 PG (ref 26.6–33)
MCHC RBC AUTO-ENTMCNC: 32.5 G/DL (ref 31.5–35.7)
MCV RBC AUTO: 91.7 FL (ref 79–97)
MONOCYTES # BLD AUTO: 0.64 10*3/MM3 (ref 0.1–0.9)
MONOCYTES NFR BLD AUTO: 6.3 % (ref 5–12)
NEUTROPHILS NFR BLD AUTO: 7.53 10*3/MM3 (ref 1.7–7)
NEUTROPHILS NFR BLD AUTO: 74.1 % (ref 42.7–76)
NRBC BLD AUTO-RTO: 0 /100 WBC (ref 0–0.2)
PLATELET # BLD AUTO: 301 10*3/MM3 (ref 140–450)
PMV BLD AUTO: 10.8 FL (ref 6–12)
POTASSIUM SERPL-SCNC: 3.4 MMOL/L (ref 3.5–5.2)
PROT SERPL-MCNC: 6.7 G/DL (ref 6–8.5)
RBC # BLD AUTO: 4.2 10*6/MM3 (ref 3.77–5.28)
SODIUM SERPL-SCNC: 142 MMOL/L (ref 136–145)
WBC NRBC COR # BLD: 10.15 10*3/MM3 (ref 3.4–10.8)

## 2022-08-23 PROCEDURE — 99214 OFFICE O/P EST MOD 30 MIN: CPT | Performed by: NURSE PRACTITIONER

## 2022-08-23 PROCEDURE — 85025 COMPLETE CBC W/AUTO DIFF WBC: CPT | Performed by: NURSE PRACTITIONER

## 2022-08-23 PROCEDURE — 80053 COMPREHEN METABOLIC PANEL: CPT | Performed by: NURSE PRACTITIONER

## 2022-08-23 PROCEDURE — 36415 COLL VENOUS BLD VENIPUNCTURE: CPT | Performed by: NURSE PRACTITIONER

## 2022-08-23 RX ORDER — METOPROLOL SUCCINATE 50 MG/1
TABLET, EXTENDED RELEASE ORAL
Qty: 45 TABLET | Refills: 5 | Status: SHIPPED | OUTPATIENT
Start: 2022-08-23

## 2022-08-23 NOTE — PROGRESS NOTES
Chief Complaint  Transitional Care Management and Palpitations    Subjective          Pati Leon presents to [facility]  History of Present Illness  She is here for follow-up the ER.  She went to the ER on 8/19/2022 for palpitations.  Her heart rate did get up into the 130s while she was there.  They did do a chest x-ray, CT scan of chest and pelvis CBC comp urinalysis.  She did have elevated alkaline phosphonate level elevated.  They did do a straight catheter urine overall looks okay.  She is just finished the Macrobid that we placed her on.  She has had some elevated heart rate since being in the emergency room it was on Saturday day and Saturday evening her heart rate was into the 130s 140s but then on Sunday it went back down to around 200.  She does say often she is dizzy with a headache but that has been for several years.  Patient does have caregivers throughout the week.  Her daughter and son-in-law take care of her through the evenings.  She does have home health.    Allergies  Sulfa antibiotics and Keflex [cephalexin]    Social History     Tobacco Use   • Smoking status: Never Smoker   • Smokeless tobacco: Never Used   Vaping Use   • Vaping Use: Never used   Substance Use Topics   • Alcohol use: Never   • Drug use: Never       Family History   Problem Relation Age of Onset   • Stroke Mother    • Heart disease Father    • Heart disease Sister    • Heart disease Brother         Health Maintenance Due   Topic Date Due   • LIPID PANEL  07/01/2021        Immunization History   Administered Date(s) Administered   • COVID-19 (MODERNA) 1st, 2nd, 3rd Dose Only 02/03/2021, 03/03/2021   • Covid-19 (Pfizer) Gray Cap 07/27/2022   • Influenza, Unspecified 01/13/2020   • Pneumococcal Conjugate 13-Valent (PCV13) 10/05/2017       Review of Systems   Constitutional: Positive for fatigue.   Cardiovascular: Positive for palpitations. Negative for leg swelling.        Objective  "      Vitals:    08/23/22 1535   BP: 120/64   Pulse: 97   Resp: 16   Temp: 97.2 °F (36.2 °C)   SpO2: 95%   Weight: 59 kg (130 lb)   Height: 165.1 cm (65\")       Body mass index is 21.63 kg/m².         Physical Exam  Vitals reviewed.   Constitutional:       Appearance: Normal appearance. She is well-developed.   Cardiovascular:      Rate and Rhythm: Normal rate and regular rhythm.      Heart sounds: Normal heart sounds. No murmur heard.  Pulmonary:      Effort: Pulmonary effort is normal.      Breath sounds: Normal breath sounds.   Neurological:      Mental Status: She is alert and oriented to person, place, and time.      Cranial Nerves: No cranial nerve deficit.      Motor: No weakness.   Psychiatric:         Mood and Affect: Mood and affect normal.       Heart regular beats today no irregularities noted on exam      Result Review :     The following data was reviewed by: JONE Fabian on 08/23/2022:                     Assessment and Plan      Diagnoses and all orders for this visit:    1. Palpitation (Primary)  -     Comprehensive metabolic panel  -     CBC w AUTO Differential    2. Elevated alkaline phosphatase level  -     Comprehensive metabolic panel  -     CBC w AUTO Differential    3. Abnormal CT of the abdomen    4. Other frontotemporal dementia without behavioral disturbance (HCC)    5. Essential hypertension  -     metoprolol succinate XL (TOPROL-XL) 50 MG 24 hr tablet; 1/2 tab am and 1 tab in evening  Dispense: 45 tablet; Refill: 5            Follow Up     Return if symptoms worsen or fail to improve.  We will increase the toprol.  We will check labs today.  We will do CT scan 4 weeks but Yenni wants to wait to schedule.  I discussed that we could go to cardiology for evaluation and also do Holter monitor.  Discussed that the risk if it is in A. fib or in a flutter there are higher risk with her to be on the blood thinner.  She is currently on aspirin.  We need to help regulate the rate " overall.  Call with questions or concerns.  Continue with home health and we will go from there.  I did want to see patient back in about 2 weeks though with her increased inability to get up and down easily and with the increase of the dementia we will hold off on doing a follow-up in the office but Yenni will keep close tabs with me on how she is doing.  Patient was given instructions and counseling regarding her condition or for health maintenance advice. Please see specific information pulled into the AVS if appropriate.         Madeline Trammell, APRN  08/23/2022

## 2022-09-12 ENCOUNTER — PATIENT MESSAGE (OUTPATIENT)
Dept: FAMILY MEDICINE CLINIC | Facility: CLINIC | Age: 87
End: 2022-09-12

## 2022-09-12 DIAGNOSIS — R00.2 PALPITATION: Primary | ICD-10-CM

## 2022-09-20 ENCOUNTER — TELEPHONE (OUTPATIENT)
Dept: FAMILY MEDICINE CLINIC | Facility: CLINIC | Age: 87
End: 2022-09-20

## 2022-09-20 DIAGNOSIS — R53.1 WEAKNESS: Primary | ICD-10-CM

## 2022-09-20 DIAGNOSIS — R19.7 DIARRHEA, UNSPECIFIED TYPE: ICD-10-CM

## 2022-09-20 NOTE — TELEPHONE ENCOUNTER
Call from Selena with [facility], stating she saw patient today and daughter states she has been having liquid bm's for about a week and is having some doizziness.  Her blood pressure today 148/82, physical therapy saw her earlier this week and was 150/90. Selena states some crackles today in most lung fields and daughter has noticed her doing some grunting. She states the daughter told her she c/o being sore in lorin region when wipes and some times blood when wipes. She states the daughter told her no skin breakdown but nurse was unable to to check her lorin area d/t dementia issues.  Selena states she is weak and she and the therapist both see a decline in her as well this week.   Selena  [phone]

## 2022-09-20 NOTE — TELEPHONE ENCOUNTER
Selena with Home Health stated she spoke with patients family politely declined to take patient to ER as it is to hard for patient to get out. Selena would like to know if you would order a CBC, CMP, and UA w/ reflex? If orders are placed she will go to the home of patient and collect tomorrow.

## 2022-09-22 ENCOUNTER — LAB REQUISITION (OUTPATIENT)
Dept: LAB | Facility: HOSPITAL | Age: 87
End: 2022-09-22

## 2022-09-22 DIAGNOSIS — I10 ESSENTIAL (PRIMARY) HYPERTENSION: ICD-10-CM

## 2022-09-22 DIAGNOSIS — R06.00 DYSPNEA, UNSPECIFIED: ICD-10-CM

## 2022-09-22 DIAGNOSIS — R19.7 DIARRHEA, UNSPECIFIED: ICD-10-CM

## 2022-09-22 DIAGNOSIS — N39.0 URINARY TRACT INFECTION, SITE NOT SPECIFIED: ICD-10-CM

## 2022-09-22 DIAGNOSIS — G30.9 ALZHEIMER'S DISEASE, UNSPECIFIED (CODE): ICD-10-CM

## 2022-09-22 DIAGNOSIS — R42 DIZZINESS AND GIDDINESS: ICD-10-CM

## 2022-09-22 DIAGNOSIS — R53.1 WEAKNESS: ICD-10-CM

## 2022-09-22 LAB
ALBUMIN SERPL-MCNC: 3.6 G/DL (ref 3.5–5.2)
ALBUMIN/GLOB SERPL: 1.1 G/DL
ALP SERPL-CCNC: 216 U/L (ref 39–117)
ALT SERPL W P-5'-P-CCNC: 10 U/L (ref 1–33)
ANION GAP SERPL CALCULATED.3IONS-SCNC: 12.1 MMOL/L (ref 5–15)
AST SERPL-CCNC: 21 U/L (ref 1–32)
BASOPHILS # BLD AUTO: 0.02 10*3/MM3 (ref 0–0.2)
BASOPHILS NFR BLD AUTO: 0.3 % (ref 0–1.5)
BILIRUB SERPL-MCNC: 0.5 MG/DL (ref 0–1.2)
BUN SERPL-MCNC: 14 MG/DL (ref 8–23)
BUN/CREAT SERPL: 21.5 (ref 7–25)
CALCIUM SPEC-SCNC: 8.6 MG/DL (ref 8.6–10.5)
CHLORIDE SERPL-SCNC: 100 MMOL/L (ref 98–107)
CO2 SERPL-SCNC: 27.9 MMOL/L (ref 22–29)
CREAT SERPL-MCNC: 0.65 MG/DL (ref 0.57–1)
DEPRECATED RDW RBC AUTO: 48.9 FL (ref 37–54)
EGFRCR SERPLBLD CKD-EPI 2021: 84.3 ML/MIN/1.73
EOSINOPHIL # BLD AUTO: 0.15 10*3/MM3 (ref 0–0.4)
EOSINOPHIL NFR BLD AUTO: 2.4 % (ref 0.3–6.2)
ERYTHROCYTE [DISTWIDTH] IN BLOOD BY AUTOMATED COUNT: 14.1 % (ref 12.3–15.4)
GLOBULIN UR ELPH-MCNC: 3.4 GM/DL
GLUCOSE SERPL-MCNC: 158 MG/DL (ref 65–99)
HCT VFR BLD AUTO: 37.6 % (ref 34–46.6)
HGB BLD-MCNC: 12.2 G/DL (ref 12–15.9)
IMM GRANULOCYTES # BLD AUTO: 0.02 10*3/MM3 (ref 0–0.05)
IMM GRANULOCYTES NFR BLD AUTO: 0.3 % (ref 0–0.5)
LYMPHOCYTES # BLD AUTO: 1.75 10*3/MM3 (ref 0.7–3.1)
LYMPHOCYTES NFR BLD AUTO: 28 % (ref 19.6–45.3)
MCH RBC QN AUTO: 30.3 PG (ref 26.6–33)
MCHC RBC AUTO-ENTMCNC: 32.4 G/DL (ref 31.5–35.7)
MCV RBC AUTO: 93.3 FL (ref 79–97)
MONOCYTES # BLD AUTO: 0.48 10*3/MM3 (ref 0.1–0.9)
MONOCYTES NFR BLD AUTO: 7.7 % (ref 5–12)
NEUTROPHILS NFR BLD AUTO: 3.82 10*3/MM3 (ref 1.7–7)
NEUTROPHILS NFR BLD AUTO: 61.3 % (ref 42.7–76)
NRBC BLD AUTO-RTO: 0 /100 WBC (ref 0–0.2)
PLATELET # BLD AUTO: 184 10*3/MM3 (ref 140–450)
PMV BLD AUTO: 10.4 FL (ref 6–12)
POTASSIUM SERPL-SCNC: 3.4 MMOL/L (ref 3.5–5.2)
PROT SERPL-MCNC: 7 G/DL (ref 6–8.5)
RBC # BLD AUTO: 4.03 10*6/MM3 (ref 3.77–5.28)
SODIUM SERPL-SCNC: 140 MMOL/L (ref 136–145)
WBC NRBC COR # BLD: 6.24 10*3/MM3 (ref 3.4–10.8)

## 2022-09-22 PROCEDURE — 85025 COMPLETE CBC W/AUTO DIFF WBC: CPT | Performed by: NURSE PRACTITIONER

## 2022-09-22 PROCEDURE — 80053 COMPREHEN METABOLIC PANEL: CPT | Performed by: NURSE PRACTITIONER

## 2022-09-27 DIAGNOSIS — N39.0 CHRONIC UTI (URINARY TRACT INFECTION): Primary | ICD-10-CM

## 2022-09-27 RX ORDER — NITROFURANTOIN 25; 75 MG/1; MG/1
100 CAPSULE ORAL 2 TIMES DAILY
Qty: 14 CAPSULE | Refills: 0 | Status: SHIPPED | OUTPATIENT
Start: 2022-09-27 | End: 2022-10-04 | Stop reason: HOSPADM

## 2022-09-27 RX ORDER — NITROFURANTOIN 25; 75 MG/1; MG/1
100 CAPSULE ORAL 2 TIMES DAILY
COMMUNITY
End: 2022-09-27 | Stop reason: SDUPTHER

## 2022-09-28 ENCOUNTER — HOSPITAL ENCOUNTER (INPATIENT)
Facility: HOSPITAL | Age: 87
LOS: 5 days | Discharge: NURSING, REHAB OR LONG-TERM CARE | End: 2022-10-04
Attending: EMERGENCY MEDICINE | Admitting: INTERNAL MEDICINE

## 2022-09-28 ENCOUNTER — APPOINTMENT (OUTPATIENT)
Dept: GENERAL RADIOLOGY | Facility: HOSPITAL | Age: 87
End: 2022-09-28

## 2022-09-28 DIAGNOSIS — R26.2 DIFFICULTY WALKING: ICD-10-CM

## 2022-09-28 DIAGNOSIS — Z78.9 DECREASED ACTIVITIES OF DAILY LIVING (ADL): ICD-10-CM

## 2022-09-28 DIAGNOSIS — A41.9 SEPSIS, DUE TO UNSPECIFIED ORGANISM, UNSPECIFIED WHETHER ACUTE ORGAN DYSFUNCTION PRESENT: Primary | ICD-10-CM

## 2022-09-28 DIAGNOSIS — R13.12 DYSPHAGIA, OROPHARYNGEAL: ICD-10-CM

## 2022-09-28 LAB
ALBUMIN SERPL-MCNC: 3.8 G/DL (ref 3.5–5.2)
ALBUMIN/GLOB SERPL: 1.1 G/DL
ALP SERPL-CCNC: 357 U/L (ref 39–117)
ALT SERPL W P-5'-P-CCNC: 229 U/L (ref 1–33)
ANION GAP SERPL CALCULATED.3IONS-SCNC: 12.1 MMOL/L (ref 5–15)
AST SERPL-CCNC: 482 U/L (ref 1–32)
BACTERIA UR QL AUTO: ABNORMAL /HPF
BASOPHILS # BLD AUTO: 0.02 10*3/MM3 (ref 0–0.2)
BASOPHILS NFR BLD AUTO: 0.1 % (ref 0–1.5)
BILIRUB SERPL-MCNC: 2.5 MG/DL (ref 0–1.2)
BILIRUB UR QL STRIP: NEGATIVE
BUN SERPL-MCNC: 14 MG/DL (ref 8–23)
BUN/CREAT SERPL: 20.9 (ref 7–25)
CALCIUM SPEC-SCNC: 9.2 MG/DL (ref 8.6–10.5)
CHLORIDE SERPL-SCNC: 102 MMOL/L (ref 98–107)
CLARITY UR: CLEAR
CO2 SERPL-SCNC: 25.9 MMOL/L (ref 22–29)
COLOR UR: ABNORMAL
CREAT SERPL-MCNC: 0.67 MG/DL (ref 0.57–1)
D-LACTATE SERPL-SCNC: 2.2 MMOL/L (ref 0.5–2)
DEPRECATED RDW RBC AUTO: 46.5 FL (ref 37–54)
EGFRCR SERPLBLD CKD-EPI 2021: 83.7 ML/MIN/1.73
EOSINOPHIL # BLD AUTO: 0 10*3/MM3 (ref 0–0.4)
EOSINOPHIL NFR BLD AUTO: 0 % (ref 0.3–6.2)
ERYTHROCYTE [DISTWIDTH] IN BLOOD BY AUTOMATED COUNT: 14 % (ref 12.3–15.4)
GLOBULIN UR ELPH-MCNC: 3.6 GM/DL
GLUCOSE BLDC GLUCOMTR-MCNC: 138 MG/DL (ref 70–99)
GLUCOSE SERPL-MCNC: 152 MG/DL (ref 65–99)
GLUCOSE UR STRIP-MCNC: NEGATIVE MG/DL
HCT VFR BLD AUTO: 37.8 % (ref 34–46.6)
HGB BLD-MCNC: 12.7 G/DL (ref 12–15.9)
HGB UR QL STRIP.AUTO: ABNORMAL
HOLD SPECIMEN: NORMAL
HOLD SPECIMEN: NORMAL
HYALINE CASTS UR QL AUTO: ABNORMAL /LPF
IMM GRANULOCYTES # BLD AUTO: 0.05 10*3/MM3 (ref 0–0.05)
IMM GRANULOCYTES NFR BLD AUTO: 0.3 % (ref 0–0.5)
KETONES UR QL STRIP: NEGATIVE
LEUKOCYTE ESTERASE UR QL STRIP.AUTO: NEGATIVE
LYMPHOCYTES # BLD AUTO: 0.87 10*3/MM3 (ref 0.7–3.1)
LYMPHOCYTES NFR BLD AUTO: 5.7 % (ref 19.6–45.3)
MAGNESIUM SERPL-MCNC: 1.9 MG/DL (ref 1.6–2.4)
MCH RBC QN AUTO: 30.5 PG (ref 26.6–33)
MCHC RBC AUTO-ENTMCNC: 33.6 G/DL (ref 31.5–35.7)
MCV RBC AUTO: 90.6 FL (ref 79–97)
MONOCYTES # BLD AUTO: 0.64 10*3/MM3 (ref 0.1–0.9)
MONOCYTES NFR BLD AUTO: 4.2 % (ref 5–12)
NEUTROPHILS NFR BLD AUTO: 13.57 10*3/MM3 (ref 1.7–7)
NEUTROPHILS NFR BLD AUTO: 89.7 % (ref 42.7–76)
NITRITE UR QL STRIP: NEGATIVE
NRBC BLD AUTO-RTO: 0 /100 WBC (ref 0–0.2)
PH UR STRIP.AUTO: 7.5 [PH] (ref 5–8)
PLATELET # BLD AUTO: 181 10*3/MM3 (ref 140–450)
PMV BLD AUTO: 9.7 FL (ref 6–12)
POTASSIUM SERPL-SCNC: 3 MMOL/L (ref 3.5–5.2)
PROT SERPL-MCNC: 7.4 G/DL (ref 6–8.5)
PROT UR QL STRIP: ABNORMAL
RBC # BLD AUTO: 4.17 10*6/MM3 (ref 3.77–5.28)
RBC # UR STRIP: ABNORMAL /HPF
REF LAB TEST METHOD: ABNORMAL
SODIUM SERPL-SCNC: 140 MMOL/L (ref 136–145)
SP GR UR STRIP: 1.01 (ref 1–1.03)
SQUAMOUS #/AREA URNS HPF: ABNORMAL /HPF
TROPONIN T SERPL-MCNC: <0.01 NG/ML (ref 0–0.03)
UROBILINOGEN UR QL STRIP: ABNORMAL
WBC # UR STRIP: ABNORMAL /HPF
WBC NRBC COR # BLD: 15.15 10*3/MM3 (ref 3.4–10.8)
WHOLE BLOOD HOLD COAG: NORMAL
WHOLE BLOOD HOLD SPECIMEN: NORMAL

## 2022-09-28 PROCEDURE — 36415 COLL VENOUS BLD VENIPUNCTURE: CPT

## 2022-09-28 PROCEDURE — 83605 ASSAY OF LACTIC ACID: CPT

## 2022-09-28 PROCEDURE — 80053 COMPREHEN METABOLIC PANEL: CPT

## 2022-09-28 PROCEDURE — 71045 X-RAY EXAM CHEST 1 VIEW: CPT

## 2022-09-28 PROCEDURE — 25010000002 VANCOMYCIN 5 G RECONSTITUTED SOLUTION: Performed by: EMERGENCY MEDICINE

## 2022-09-28 PROCEDURE — 85025 COMPLETE CBC W/AUTO DIFF WBC: CPT

## 2022-09-28 PROCEDURE — 93010 ELECTROCARDIOGRAM REPORT: CPT | Performed by: INTERNAL MEDICINE

## 2022-09-28 PROCEDURE — 87040 BLOOD CULTURE FOR BACTERIA: CPT

## 2022-09-28 PROCEDURE — 82962 GLUCOSE BLOOD TEST: CPT

## 2022-09-28 PROCEDURE — 93005 ELECTROCARDIOGRAM TRACING: CPT

## 2022-09-28 PROCEDURE — 99285 EMERGENCY DEPT VISIT HI MDM: CPT

## 2022-09-28 PROCEDURE — 83735 ASSAY OF MAGNESIUM: CPT

## 2022-09-28 PROCEDURE — 25010000002 MEROPENEM PER 100 MG: Performed by: EMERGENCY MEDICINE

## 2022-09-28 PROCEDURE — 81001 URINALYSIS AUTO W/SCOPE: CPT | Performed by: EMERGENCY MEDICINE

## 2022-09-28 PROCEDURE — 84484 ASSAY OF TROPONIN QUANT: CPT

## 2022-09-28 PROCEDURE — 93005 ELECTROCARDIOGRAM TRACING: CPT | Performed by: EMERGENCY MEDICINE

## 2022-09-28 RX ORDER — SODIUM CHLORIDE 0.9 % (FLUSH) 0.9 %
10 SYRINGE (ML) INJECTION AS NEEDED
Status: DISCONTINUED | OUTPATIENT
Start: 2022-09-28 | End: 2022-10-04 | Stop reason: HOSPADM

## 2022-09-28 RX ADMIN — Medication 1250 MG: at 23:34

## 2022-09-28 RX ADMIN — SODIUM CHLORIDE, POTASSIUM CHLORIDE, SODIUM LACTATE AND CALCIUM CHLORIDE 1803 ML: 600; 310; 30; 20 INJECTION, SOLUTION INTRAVENOUS at 22:47

## 2022-09-28 RX ADMIN — Medication 1 G: at 22:47

## 2022-09-29 ENCOUNTER — APPOINTMENT (OUTPATIENT)
Dept: CT IMAGING | Facility: HOSPITAL | Age: 87
End: 2022-09-29

## 2022-09-29 PROBLEM — A41.9 SEPSIS, DUE TO UNSPECIFIED ORGANISM, UNSPECIFIED WHETHER ACUTE ORGAN DYSFUNCTION PRESENT: Status: ACTIVE | Noted: 2022-09-29

## 2022-09-29 LAB
ALBUMIN SERPL-MCNC: 3.3 G/DL (ref 3.5–5.2)
ALBUMIN/GLOB SERPL: 1 G/DL
ALP SERPL-CCNC: 337 U/L (ref 39–117)
ALT SERPL W P-5'-P-CCNC: 194 U/L (ref 1–33)
ANION GAP SERPL CALCULATED.3IONS-SCNC: 9.1 MMOL/L (ref 5–15)
AST SERPL-CCNC: 301 U/L (ref 1–32)
BACTERIA UR QL AUTO: ABNORMAL /HPF
BASOPHILS # BLD AUTO: 0.01 10*3/MM3 (ref 0–0.2)
BASOPHILS NFR BLD AUTO: 0.1 % (ref 0–1.5)
BILIRUB SERPL-MCNC: 2.5 MG/DL (ref 0–1.2)
BILIRUB UR QL STRIP: ABNORMAL
BUN SERPL-MCNC: 12 MG/DL (ref 8–23)
BUN/CREAT SERPL: 21.4 (ref 7–25)
CALCIUM SPEC-SCNC: 9 MG/DL (ref 8.6–10.5)
CHLORIDE SERPL-SCNC: 103 MMOL/L (ref 98–107)
CLARITY UR: CLEAR
CO2 SERPL-SCNC: 25.9 MMOL/L (ref 22–29)
COLOR UR: ABNORMAL
CREAT SERPL-MCNC: 0.56 MG/DL (ref 0.57–1)
D-LACTATE SERPL-SCNC: 2 MMOL/L (ref 0.5–2)
DEPRECATED RDW RBC AUTO: 47 FL (ref 37–54)
EGFRCR SERPLBLD CKD-EPI 2021: 87.4 ML/MIN/1.73
EOSINOPHIL # BLD AUTO: 0.01 10*3/MM3 (ref 0–0.4)
EOSINOPHIL NFR BLD AUTO: 0.1 % (ref 0.3–6.2)
ERYTHROCYTE [DISTWIDTH] IN BLOOD BY AUTOMATED COUNT: 14.1 % (ref 12.3–15.4)
GLOBULIN UR ELPH-MCNC: 3.4 GM/DL
GLUCOSE SERPL-MCNC: 122 MG/DL (ref 65–99)
GLUCOSE UR STRIP-MCNC: NEGATIVE MG/DL
HCT VFR BLD AUTO: 35.1 % (ref 34–46.6)
HGB BLD-MCNC: 11.8 G/DL (ref 12–15.9)
HGB UR QL STRIP.AUTO: NEGATIVE
HYALINE CASTS UR QL AUTO: ABNORMAL /LPF
IMM GRANULOCYTES # BLD AUTO: 0.02 10*3/MM3 (ref 0–0.05)
IMM GRANULOCYTES NFR BLD AUTO: 0.2 % (ref 0–0.5)
KETONES UR QL STRIP: ABNORMAL
LEUKOCYTE ESTERASE UR QL STRIP.AUTO: NEGATIVE
LYMPHOCYTES # BLD AUTO: 0.79 10*3/MM3 (ref 0.7–3.1)
LYMPHOCYTES NFR BLD AUTO: 7.3 % (ref 19.6–45.3)
MCH RBC QN AUTO: 30.6 PG (ref 26.6–33)
MCHC RBC AUTO-ENTMCNC: 33.6 G/DL (ref 31.5–35.7)
MCV RBC AUTO: 90.9 FL (ref 79–97)
MONOCYTES # BLD AUTO: 0.43 10*3/MM3 (ref 0.1–0.9)
MONOCYTES NFR BLD AUTO: 4 % (ref 5–12)
NEUTROPHILS NFR BLD AUTO: 88.3 % (ref 42.7–76)
NEUTROPHILS NFR BLD AUTO: 9.52 10*3/MM3 (ref 1.7–7)
NITRITE UR QL STRIP: NEGATIVE
NRBC BLD AUTO-RTO: 0 /100 WBC (ref 0–0.2)
PH UR STRIP.AUTO: 7 [PH] (ref 5–8)
PLATELET # BLD AUTO: 150 10*3/MM3 (ref 140–450)
PMV BLD AUTO: 9.7 FL (ref 6–12)
POTASSIUM SERPL-SCNC: 2.9 MMOL/L (ref 3.5–5.2)
PROT SERPL-MCNC: 6.7 G/DL (ref 6–8.5)
PROT UR QL STRIP: ABNORMAL
QT INTERVAL: 352 MS
RBC # BLD AUTO: 3.86 10*6/MM3 (ref 3.77–5.28)
RBC # UR STRIP: ABNORMAL /HPF
REF LAB TEST METHOD: ABNORMAL
SODIUM SERPL-SCNC: 138 MMOL/L (ref 136–145)
SP GR UR STRIP: 1.02 (ref 1–1.03)
SQUAMOUS #/AREA URNS HPF: ABNORMAL /HPF
UROBILINOGEN UR QL STRIP: ABNORMAL
WBC # UR STRIP: ABNORMAL /HPF
WBC NRBC COR # BLD: 10.78 10*3/MM3 (ref 3.4–10.8)

## 2022-09-29 PROCEDURE — 25010000002 HEPARIN (PORCINE) PER 1000 UNITS: Performed by: INTERNAL MEDICINE

## 2022-09-29 PROCEDURE — 0 POTASSIUM CHLORIDE 10 MEQ/100ML SOLUTION: Performed by: HOSPITALIST

## 2022-09-29 PROCEDURE — 0 IOPAMIDOL PER 1 ML: Performed by: EMERGENCY MEDICINE

## 2022-09-29 PROCEDURE — 97162 PT EVAL MOD COMPLEX 30 MIN: CPT

## 2022-09-29 PROCEDURE — 94799 UNLISTED PULMONARY SVC/PX: CPT

## 2022-09-29 PROCEDURE — 74177 CT ABD & PELVIS W/CONTRAST: CPT

## 2022-09-29 PROCEDURE — 99223 1ST HOSP IP/OBS HIGH 75: CPT | Performed by: INTERNAL MEDICINE

## 2022-09-29 PROCEDURE — 36415 COLL VENOUS BLD VENIPUNCTURE: CPT | Performed by: INTERNAL MEDICINE

## 2022-09-29 PROCEDURE — 83605 ASSAY OF LACTIC ACID: CPT | Performed by: EMERGENCY MEDICINE

## 2022-09-29 PROCEDURE — 80053 COMPREHEN METABOLIC PANEL: CPT | Performed by: INTERNAL MEDICINE

## 2022-09-29 PROCEDURE — 25010000002 MEROPENEM PER 100 MG: Performed by: INTERNAL MEDICINE

## 2022-09-29 PROCEDURE — 92610 EVALUATE SWALLOWING FUNCTION: CPT

## 2022-09-29 PROCEDURE — 81001 URINALYSIS AUTO W/SCOPE: CPT | Performed by: INTERNAL MEDICINE

## 2022-09-29 PROCEDURE — 85025 COMPLETE CBC W/AUTO DIFF WBC: CPT | Performed by: INTERNAL MEDICINE

## 2022-09-29 RX ORDER — ACETAMINOPHEN 500 MG
1000 TABLET ORAL NIGHTLY
COMMUNITY
End: 2022-10-26 | Stop reason: SDUPTHER

## 2022-09-29 RX ORDER — HEPARIN SODIUM 5000 [USP'U]/ML
5000 INJECTION, SOLUTION INTRAVENOUS; SUBCUTANEOUS EVERY 12 HOURS SCHEDULED
Status: DISCONTINUED | OUTPATIENT
Start: 2022-09-29 | End: 2022-10-04 | Stop reason: HOSPADM

## 2022-09-29 RX ORDER — METOPROLOL SUCCINATE 50 MG/1
50 TABLET, EXTENDED RELEASE ORAL NIGHTLY
Status: DISCONTINUED | OUTPATIENT
Start: 2022-09-29 | End: 2022-10-04 | Stop reason: HOSPADM

## 2022-09-29 RX ORDER — ASPIRIN 81 MG/1
81 TABLET ORAL DAILY
Status: DISCONTINUED | OUTPATIENT
Start: 2022-09-30 | End: 2022-09-30

## 2022-09-29 RX ORDER — SODIUM CHLORIDE 9 MG/ML
40 INJECTION, SOLUTION INTRAVENOUS AS NEEDED
Status: DISCONTINUED | OUTPATIENT
Start: 2022-09-29 | End: 2022-10-04 | Stop reason: HOSPADM

## 2022-09-29 RX ORDER — SODIUM PHOSPHATE IN D5W 15MMOL/250
15 PLASTIC BAG, INJECTION (ML) INTRAVENOUS ONCE
Status: DISCONTINUED | OUTPATIENT
Start: 2022-09-29 | End: 2022-09-29 | Stop reason: DRUGHIGH

## 2022-09-29 RX ORDER — NITROGLYCERIN 0.4 MG/1
0.4 TABLET SUBLINGUAL
Status: DISCONTINUED | OUTPATIENT
Start: 2022-09-29 | End: 2022-10-04 | Stop reason: HOSPADM

## 2022-09-29 RX ORDER — METOPROLOL SUCCINATE 25 MG/1
25 TABLET, EXTENDED RELEASE ORAL
Status: DISCONTINUED | OUTPATIENT
Start: 2022-09-30 | End: 2022-10-03

## 2022-09-29 RX ORDER — SODIUM CHLORIDE 0.9 % (FLUSH) 0.9 %
10 SYRINGE (ML) INJECTION EVERY 12 HOURS SCHEDULED
Status: DISCONTINUED | OUTPATIENT
Start: 2022-09-29 | End: 2022-10-04 | Stop reason: HOSPADM

## 2022-09-29 RX ORDER — POTASSIUM CHLORIDE 750 MG/1
40 CAPSULE, EXTENDED RELEASE ORAL EVERY 4 HOURS
Status: DISCONTINUED | OUTPATIENT
Start: 2022-09-29 | End: 2022-09-29 | Stop reason: SDUPTHER

## 2022-09-29 RX ORDER — SODIUM CHLORIDE 0.9 % (FLUSH) 0.9 %
10 SYRINGE (ML) INJECTION AS NEEDED
Status: DISCONTINUED | OUTPATIENT
Start: 2022-09-29 | End: 2022-10-04 | Stop reason: HOSPADM

## 2022-09-29 RX ORDER — ACETAMINOPHEN 160 MG/5ML
650 SOLUTION ORAL EVERY 4 HOURS PRN
Status: DISCONTINUED | OUTPATIENT
Start: 2022-09-29 | End: 2022-10-04 | Stop reason: HOSPADM

## 2022-09-29 RX ORDER — ACETAMINOPHEN 650 MG/1
650 SUPPOSITORY RECTAL EVERY 4 HOURS PRN
Status: DISCONTINUED | OUTPATIENT
Start: 2022-09-29 | End: 2022-10-04 | Stop reason: HOSPADM

## 2022-09-29 RX ORDER — SODIUM CHLORIDE, SODIUM LACTATE, POTASSIUM CHLORIDE, CALCIUM CHLORIDE 600; 310; 30; 20 MG/100ML; MG/100ML; MG/100ML; MG/100ML
75 INJECTION, SOLUTION INTRAVENOUS CONTINUOUS
Status: DISCONTINUED | OUTPATIENT
Start: 2022-09-29 | End: 2022-09-30

## 2022-09-29 RX ORDER — ACETAMINOPHEN 325 MG/1
650 TABLET ORAL EVERY 4 HOURS PRN
Status: DISCONTINUED | OUTPATIENT
Start: 2022-09-29 | End: 2022-10-04 | Stop reason: HOSPADM

## 2022-09-29 RX ORDER — POTASSIUM CHLORIDE 7.45 MG/ML
10 INJECTION INTRAVENOUS
Status: COMPLETED | OUTPATIENT
Start: 2022-09-29 | End: 2022-09-29

## 2022-09-29 RX ORDER — METOPROLOL SUCCINATE 25 MG/1
12.5 TABLET, EXTENDED RELEASE ORAL
Status: DISCONTINUED | OUTPATIENT
Start: 2022-09-29 | End: 2022-09-29

## 2022-09-29 RX ORDER — LOSARTAN POTASSIUM 25 MG/1
25 TABLET ORAL
Status: DISCONTINUED | OUTPATIENT
Start: 2022-09-29 | End: 2022-10-01

## 2022-09-29 RX ADMIN — POTASSIUM CHLORIDE 10 MEQ: 7.46 INJECTION, SOLUTION INTRAVENOUS at 06:15

## 2022-09-29 RX ADMIN — METOPROLOL SUCCINATE 12.5 MG: 25 TABLET, EXTENDED RELEASE ORAL at 11:14

## 2022-09-29 RX ADMIN — POTASSIUM CHLORIDE 10 MEQ: 7.46 INJECTION, SOLUTION INTRAVENOUS at 07:21

## 2022-09-29 RX ADMIN — MEROPENEM 1 G: 1 INJECTION, POWDER, FOR SOLUTION INTRAVENOUS at 09:15

## 2022-09-29 RX ADMIN — POTASSIUM CHLORIDE 10 MEQ: 7.46 INJECTION, SOLUTION INTRAVENOUS at 10:37

## 2022-09-29 RX ADMIN — SODIUM CHLORIDE, POTASSIUM CHLORIDE, SODIUM LACTATE AND CALCIUM CHLORIDE 75 ML/HR: 600; 310; 30; 20 INJECTION, SOLUTION INTRAVENOUS at 04:26

## 2022-09-29 RX ADMIN — POTASSIUM CHLORIDE 10 MEQ: 7.46 INJECTION, SOLUTION INTRAVENOUS at 09:15

## 2022-09-29 RX ADMIN — Medication 10 ML: at 20:40

## 2022-09-29 RX ADMIN — HEPARIN SODIUM 5000 UNITS: 5000 INJECTION INTRAVENOUS; SUBCUTANEOUS at 20:40

## 2022-09-29 RX ADMIN — MEROPENEM 1 G: 1 INJECTION, POWDER, FOR SOLUTION INTRAVENOUS at 14:56

## 2022-09-29 RX ADMIN — POTASSIUM CHLORIDE 10 MEQ: 7.46 INJECTION, SOLUTION INTRAVENOUS at 11:20

## 2022-09-29 RX ADMIN — IOPAMIDOL 100 ML: 755 INJECTION, SOLUTION INTRAVENOUS at 00:29

## 2022-09-29 RX ADMIN — METOPROLOL SUCCINATE 50 MG: 50 TABLET, EXTENDED RELEASE ORAL at 20:41

## 2022-09-29 RX ADMIN — LOSARTAN POTASSIUM 25 MG: 25 TABLET, FILM COATED ORAL at 11:14

## 2022-09-29 RX ADMIN — POTASSIUM CHLORIDE 10 MEQ: 7.46 INJECTION, SOLUTION INTRAVENOUS at 08:21

## 2022-09-29 RX ADMIN — HEPARIN SODIUM 5000 UNITS: 5000 INJECTION INTRAVENOUS; SUBCUTANEOUS at 08:30

## 2022-09-29 RX ADMIN — MEROPENEM 1 G: 1 INJECTION, POWDER, FOR SOLUTION INTRAVENOUS at 23:54

## 2022-09-29 RX ADMIN — SODIUM CHLORIDE, POTASSIUM CHLORIDE, SODIUM LACTATE AND CALCIUM CHLORIDE 75 ML/HR: 600; 310; 30; 20 INJECTION, SOLUTION INTRAVENOUS at 18:07

## 2022-09-30 ENCOUNTER — TELEPHONE (OUTPATIENT)
Dept: UROLOGY | Facility: CLINIC | Age: 87
End: 2022-09-30

## 2022-09-30 LAB
ALBUMIN SERPL-MCNC: 2.8 G/DL (ref 3.5–5.2)
ALP SERPL-CCNC: 392 U/L (ref 39–117)
ALT SERPL W P-5'-P-CCNC: 150 U/L (ref 1–33)
ANION GAP SERPL CALCULATED.3IONS-SCNC: 8.5 MMOL/L (ref 5–15)
AST SERPL-CCNC: 174 U/L (ref 1–32)
BILIRUB CONJ SERPL-MCNC: 2.1 MG/DL (ref 0–0.3)
BILIRUB INDIRECT SERPL-MCNC: 0.5 MG/DL
BILIRUB SERPL-MCNC: 2.6 MG/DL (ref 0–1.2)
BUN SERPL-MCNC: 10 MG/DL (ref 8–23)
BUN/CREAT SERPL: 19.6 (ref 7–25)
CALCIUM SPEC-SCNC: 8.2 MG/DL (ref 8.6–10.5)
CHLORIDE SERPL-SCNC: 105 MMOL/L (ref 98–107)
CO2 SERPL-SCNC: 25.5 MMOL/L (ref 22–29)
CREAT SERPL-MCNC: 0.51 MG/DL (ref 0.57–1)
DEPRECATED RDW RBC AUTO: 48.8 FL (ref 37–54)
EGFRCR SERPLBLD CKD-EPI 2021: 89.4 ML/MIN/1.73
ERYTHROCYTE [DISTWIDTH] IN BLOOD BY AUTOMATED COUNT: 14.3 % (ref 12.3–15.4)
GLUCOSE SERPL-MCNC: 111 MG/DL (ref 65–99)
HCT VFR BLD AUTO: 33.6 % (ref 34–46.6)
HGB BLD-MCNC: 11.2 G/DL (ref 12–15.9)
MAGNESIUM SERPL-MCNC: 1.8 MG/DL (ref 1.6–2.4)
MCH RBC QN AUTO: 31.1 PG (ref 26.6–33)
MCHC RBC AUTO-ENTMCNC: 33.3 G/DL (ref 31.5–35.7)
MCV RBC AUTO: 93.3 FL (ref 79–97)
PLATELET # BLD AUTO: 132 10*3/MM3 (ref 140–450)
PMV BLD AUTO: 10.6 FL (ref 6–12)
POTASSIUM SERPL-SCNC: 3.5 MMOL/L (ref 3.5–5.2)
PROT SERPL-MCNC: 5.9 G/DL (ref 6–8.5)
RBC # BLD AUTO: 3.6 10*6/MM3 (ref 3.77–5.28)
SODIUM SERPL-SCNC: 139 MMOL/L (ref 136–145)
WBC NRBC COR # BLD: 5.71 10*3/MM3 (ref 3.4–10.8)

## 2022-09-30 PROCEDURE — 85027 COMPLETE CBC AUTOMATED: CPT | Performed by: INTERNAL MEDICINE

## 2022-09-30 PROCEDURE — 25010000002 CEFAZOLIN 1-4 GM/50ML-% SOLUTION: Performed by: INTERNAL MEDICINE

## 2022-09-30 PROCEDURE — 80048 BASIC METABOLIC PNL TOTAL CA: CPT | Performed by: INTERNAL MEDICINE

## 2022-09-30 PROCEDURE — 99232 SBSQ HOSP IP/OBS MODERATE 35: CPT | Performed by: INTERNAL MEDICINE

## 2022-09-30 PROCEDURE — 25010000002 MEROPENEM PER 100 MG: Performed by: INTERNAL MEDICINE

## 2022-09-30 PROCEDURE — 97530 THERAPEUTIC ACTIVITIES: CPT

## 2022-09-30 PROCEDURE — 80076 HEPATIC FUNCTION PANEL: CPT | Performed by: INTERNAL MEDICINE

## 2022-09-30 PROCEDURE — 83735 ASSAY OF MAGNESIUM: CPT | Performed by: INTERNAL MEDICINE

## 2022-09-30 PROCEDURE — 25010000002 HEPARIN (PORCINE) PER 1000 UNITS: Performed by: INTERNAL MEDICINE

## 2022-09-30 RX ORDER — HYDROXYZINE PAMOATE 25 MG/1
25 CAPSULE ORAL DAILY
Status: DISCONTINUED | OUTPATIENT
Start: 2022-09-30 | End: 2022-10-02

## 2022-09-30 RX ORDER — LATANOPROST 50 UG/ML
1 SOLUTION/ DROPS OPHTHALMIC NIGHTLY
Status: DISCONTINUED | OUTPATIENT
Start: 2022-09-30 | End: 2022-10-04 | Stop reason: HOSPADM

## 2022-09-30 RX ORDER — ASPIRIN 81 MG/1
81 TABLET ORAL DAILY
Status: DISCONTINUED | OUTPATIENT
Start: 2022-10-01 | End: 2022-10-04 | Stop reason: HOSPADM

## 2022-09-30 RX ORDER — CEFAZOLIN SODIUM 1 G/50ML
1 INJECTION, SOLUTION INTRAVENOUS EVERY 8 HOURS
Status: COMPLETED | OUTPATIENT
Start: 2022-09-30 | End: 2022-10-03

## 2022-09-30 RX ORDER — POTASSIUM CHLORIDE 750 MG/1
40 CAPSULE, EXTENDED RELEASE ORAL ONCE
Status: COMPLETED | OUTPATIENT
Start: 2022-09-30 | End: 2022-09-30

## 2022-09-30 RX ORDER — DONEPEZIL HYDROCHLORIDE 5 MG/1
5 TABLET, FILM COATED ORAL NIGHTLY
Status: DISCONTINUED | OUTPATIENT
Start: 2022-09-30 | End: 2022-10-04 | Stop reason: HOSPADM

## 2022-09-30 RX ORDER — TIMOLOL MALEATE 5 MG/ML
1 SOLUTION/ DROPS OPHTHALMIC DAILY
Status: DISCONTINUED | OUTPATIENT
Start: 2022-09-30 | End: 2022-10-04 | Stop reason: HOSPADM

## 2022-09-30 RX ADMIN — DONEPEZIL HYDROCHLORIDE 5 MG: 5 TABLET ORAL at 21:15

## 2022-09-30 RX ADMIN — METOPROLOL SUCCINATE 50 MG: 50 TABLET, EXTENDED RELEASE ORAL at 21:15

## 2022-09-30 RX ADMIN — METOPROLOL SUCCINATE 25 MG: 25 TABLET, EXTENDED RELEASE ORAL at 06:10

## 2022-09-30 RX ADMIN — HEPARIN SODIUM 5000 UNITS: 5000 INJECTION INTRAVENOUS; SUBCUTANEOUS at 21:14

## 2022-09-30 RX ADMIN — HYDROXYZINE PAMOATE 25 MG: 25 CAPSULE ORAL at 12:27

## 2022-09-30 RX ADMIN — TIMOLOL MALEATE 1 DROP: 5 SOLUTION/ DROPS OPHTHALMIC at 12:28

## 2022-09-30 RX ADMIN — POTASSIUM CHLORIDE 40 MEQ: 10 CAPSULE, COATED, EXTENDED RELEASE ORAL at 12:30

## 2022-09-30 RX ADMIN — ASPIRIN 81 MG: 81 TABLET, COATED ORAL at 08:21

## 2022-09-30 RX ADMIN — LATANOPROST 1 DROP: 50 SOLUTION OPHTHALMIC at 21:22

## 2022-09-30 RX ADMIN — HEPARIN SODIUM 5000 UNITS: 5000 INJECTION INTRAVENOUS; SUBCUTANEOUS at 08:21

## 2022-09-30 RX ADMIN — MEROPENEM 1 G: 1 INJECTION, POWDER, FOR SOLUTION INTRAVENOUS at 06:10

## 2022-09-30 RX ADMIN — LOSARTAN POTASSIUM 25 MG: 25 TABLET, FILM COATED ORAL at 08:21

## 2022-09-30 RX ADMIN — CEFAZOLIN SODIUM 1 G: 1 INJECTION, SOLUTION INTRAVENOUS at 12:28

## 2022-09-30 RX ADMIN — CEFAZOLIN SODIUM 1 G: 1 INJECTION, SOLUTION INTRAVENOUS at 21:16

## 2022-09-30 RX ADMIN — Medication 10 ML: at 21:16

## 2022-09-30 NOTE — TELEPHONE ENCOUNTER
Spoke with the internal medicine hospitalist and patient will need to have a follow-up appointment within the next couple of weeks after discharge.  As she has only been established with me and is not established technically with one of the physicians in this office we can get her set up with an appointment with Roxanna Marshall or Dr. Koehler.

## 2022-09-30 NOTE — TELEPHONE ENCOUNTER
Daron Jaime, Internal Medicine Hospitalist at [facility], called to speak to Shaina.  Please call or text him at his cell [phone].    Patient is admitted with urinary tract infection.  She is improving.  Will probably be admitted through the weekend.  He is aware patient has tried been trying various things to prevent recurrent UTIs.    He wants patient to follow up in mid to late October.

## 2022-10-01 ENCOUNTER — APPOINTMENT (OUTPATIENT)
Dept: ULTRASOUND IMAGING | Facility: HOSPITAL | Age: 87
End: 2022-10-01

## 2022-10-01 LAB
ALBUMIN SERPL-MCNC: 2.6 G/DL (ref 3.5–5.2)
ALBUMIN/GLOB SERPL: 0.8 G/DL
ALP SERPL-CCNC: 582 U/L (ref 39–117)
ALT SERPL W P-5'-P-CCNC: 179 U/L (ref 1–33)
ANION GAP SERPL CALCULATED.3IONS-SCNC: 7.6 MMOL/L (ref 5–15)
AST SERPL-CCNC: 248 U/L (ref 1–32)
BILIRUB SERPL-MCNC: 1.7 MG/DL (ref 0–1.2)
BUN SERPL-MCNC: 11 MG/DL (ref 8–23)
BUN/CREAT SERPL: 20.8 (ref 7–25)
CALCIUM SPEC-SCNC: 8.3 MG/DL (ref 8.6–10.5)
CHLORIDE SERPL-SCNC: 107 MMOL/L (ref 98–107)
CO2 SERPL-SCNC: 24.4 MMOL/L (ref 22–29)
CREAT SERPL-MCNC: 0.53 MG/DL (ref 0.57–1)
DEPRECATED RDW RBC AUTO: 49 FL (ref 37–54)
EGFRCR SERPLBLD CKD-EPI 2021: 88.5 ML/MIN/1.73
ERYTHROCYTE [DISTWIDTH] IN BLOOD BY AUTOMATED COUNT: 14.3 % (ref 12.3–15.4)
GLOBULIN UR ELPH-MCNC: 3.4 GM/DL
GLUCOSE SERPL-MCNC: 111 MG/DL (ref 65–99)
HCT VFR BLD AUTO: 35.1 % (ref 34–46.6)
HGB BLD-MCNC: 11.5 G/DL (ref 12–15.9)
MAGNESIUM SERPL-MCNC: 1.9 MG/DL (ref 1.6–2.4)
MCH RBC QN AUTO: 30.2 PG (ref 26.6–33)
MCHC RBC AUTO-ENTMCNC: 32.8 G/DL (ref 31.5–35.7)
MCV RBC AUTO: 92.1 FL (ref 79–97)
PLATELET # BLD AUTO: 161 10*3/MM3 (ref 140–450)
PMV BLD AUTO: 10.4 FL (ref 6–12)
POTASSIUM SERPL-SCNC: 3.4 MMOL/L (ref 3.5–5.2)
PROT SERPL-MCNC: 6 G/DL (ref 6–8.5)
RBC # BLD AUTO: 3.81 10*6/MM3 (ref 3.77–5.28)
SODIUM SERPL-SCNC: 139 MMOL/L (ref 136–145)
WBC NRBC COR # BLD: 4.57 10*3/MM3 (ref 3.4–10.8)

## 2022-10-01 PROCEDURE — 99232 SBSQ HOSP IP/OBS MODERATE 35: CPT | Performed by: INTERNAL MEDICINE

## 2022-10-01 PROCEDURE — 83735 ASSAY OF MAGNESIUM: CPT | Performed by: INTERNAL MEDICINE

## 2022-10-01 PROCEDURE — 25010000002 HEPARIN (PORCINE) PER 1000 UNITS: Performed by: INTERNAL MEDICINE

## 2022-10-01 PROCEDURE — 80053 COMPREHEN METABOLIC PANEL: CPT | Performed by: INTERNAL MEDICINE

## 2022-10-01 PROCEDURE — 85027 COMPLETE CBC AUTOMATED: CPT | Performed by: INTERNAL MEDICINE

## 2022-10-01 PROCEDURE — 25010000002 CEFAZOLIN 1-4 GM/50ML-% SOLUTION: Performed by: INTERNAL MEDICINE

## 2022-10-01 PROCEDURE — 76705 ECHO EXAM OF ABDOMEN: CPT

## 2022-10-01 RX ORDER — LOSARTAN POTASSIUM 50 MG/1
50 TABLET ORAL
Status: DISCONTINUED | OUTPATIENT
Start: 2022-10-01 | End: 2022-10-02

## 2022-10-01 RX ORDER — POTASSIUM CHLORIDE 750 MG/1
40 CAPSULE, EXTENDED RELEASE ORAL ONCE
Status: COMPLETED | OUTPATIENT
Start: 2022-10-01 | End: 2022-10-01

## 2022-10-01 RX ADMIN — HYDROXYZINE PAMOATE 25 MG: 25 CAPSULE ORAL at 09:37

## 2022-10-01 RX ADMIN — HEPARIN SODIUM 5000 UNITS: 5000 INJECTION INTRAVENOUS; SUBCUTANEOUS at 21:37

## 2022-10-01 RX ADMIN — CEFAZOLIN SODIUM 1 G: 1 INJECTION, SOLUTION INTRAVENOUS at 05:23

## 2022-10-01 RX ADMIN — CEFAZOLIN SODIUM 1 G: 1 INJECTION, SOLUTION INTRAVENOUS at 21:37

## 2022-10-01 RX ADMIN — CEFAZOLIN SODIUM 1 G: 1 INJECTION, SOLUTION INTRAVENOUS at 12:49

## 2022-10-01 RX ADMIN — METOPROLOL SUCCINATE 50 MG: 50 TABLET, EXTENDED RELEASE ORAL at 21:37

## 2022-10-01 RX ADMIN — POTASSIUM CHLORIDE 40 MEQ: 10 CAPSULE, COATED, EXTENDED RELEASE ORAL at 08:35

## 2022-10-01 RX ADMIN — METOPROLOL SUCCINATE 25 MG: 25 TABLET, EXTENDED RELEASE ORAL at 05:28

## 2022-10-01 RX ADMIN — HEPARIN SODIUM 5000 UNITS: 5000 INJECTION INTRAVENOUS; SUBCUTANEOUS at 08:35

## 2022-10-01 RX ADMIN — DONEPEZIL HYDROCHLORIDE 5 MG: 5 TABLET ORAL at 21:37

## 2022-10-01 RX ADMIN — ASPIRIN 81 MG: 81 TABLET, COATED ORAL at 08:35

## 2022-10-01 RX ADMIN — LATANOPROST 1 DROP: 50 SOLUTION OPHTHALMIC at 21:38

## 2022-10-01 RX ADMIN — TIMOLOL MALEATE 1 DROP: 5 SOLUTION/ DROPS OPHTHALMIC at 08:36

## 2022-10-01 RX ADMIN — LOSARTAN POTASSIUM 50 MG: 50 TABLET, FILM COATED ORAL at 08:35

## 2022-10-01 NOTE — PROGRESS NOTES
[facility]   Hospitalist Progress Note  Date: 10/1/2022  Patient Name: Pati Leon  : 1933  MRN: [id number]  Date of admission: 2022      Subjective   Subjective     Chief Complaint:   Altered mental status    Summary:   Pati Leon is an 89 y.o. female with past medical history of dementia, recurrent urinary tract infections presents for altered mental status.  Daughter at bedside providing majority of history given patient's altered mentation.  Patient was recently tested positive for urinary tract infection as an outpatient, started on on outpatient antibiotics, believed to be Macrobid.  However given worsening mentation at home, daughter brought patient into the emergency department for evaluation.  ED work-up shows leukocytosis, elevated LFTs, patient was tachycardic.  CT abdomen and pelvis shows diffuse hepatic steatosis but otherwise unrevealing.  Patient was started on IV fluids and broad-spectrum antibiotics in the treatment of urinary tract infection resulting in encephalopathy.  Was able to discuss with daughter at bedside on 2022, given patient's dementia combined with urinary tract infections, daughter is aware that this will be is slowly continuing worsening of her baseline mentation.    Interval Followup:   No acute events overnight.  Patient remains pleasantly confused.  Does not have any physical complaints today, denies dysuria, chest pain, shortness of breath.  No nausea or vomiting.  Not eating a whole lot of her meals per nursing staff.  Otherwise no new concerns from nursing.    Review of Systems   Attempted but difficult to obtain due to baseline confusion    Objective   Objective     Vitals:   Temp:  [97.3 °F (36.3 °C)-98.2 °F (36.8 °C)] 98.2 °F (36.8 °C)  Heart Rate:  [] 93  Resp:  [14-18] 16  BP: (150-173)/(60-76) 173/76  Physical Exam    Constitutional: Lying in bed, awake and alert, no acute distress   Eyes: Pupils equal, sclerae anicteric, no  conjunctival injection   HENT: NCAT, mucous membranes moist   Neck: Supple, no thyromegaly, no lymphadenopathy, trachea midline   Respiratory: Clear to auscultation bilaterally, nonlabored respirations    Cardiovascular: Irregularly irregular, no murmurs, rubs, or gallops   Gastrointestinal: Positive bowel sounds, soft, nontender, nondistended   Musculoskeletal: No bilateral ankle edema, no clubbing or cyanosis to extremities   Neurologic: Oriented x 1, cranial nerves grossly intact, moves all 4 extremities   Skin: No rashes     Result Review    Result Review:  I have personally reviewed the results from 10/1/2022 and agree with these findings:  [x]  Laboratory  CBC    CBC 9/29/22 9/30/22 10/1/22   WBC 10.78 5.71 4.57   RBC 3.86 3.60 (A) 3.81   Hemoglobin 11.8 (A) 11.2 (A) 11.5 (A)   Hematocrit 35.1 33.6 (A) 35.1   MCV 90.9 93.3 92.1   MCH 30.6 31.1 30.2   MCHC 33.6 33.3 32.8   RDW 14.1 14.3 14.3   Platelets 150 132 (A) 161   (A) Abnormal value            CMP    CMP 9/29/22 9/30/22 9/30/22 10/1/22     0551 0551    Glucose 122 (A) 111 (A)  111 (A)   BUN 12 10  11   Creatinine 0.56 (A) 0.51 (A)  0.53 (A)   Sodium 138 139  139   Potassium 2.9 (A) 3.5  3.4 (A)   Chloride 103 105  107   Calcium 9.0 8.2 (A)  8.3 (A)   Albumin 3.30 (A)  2.80 (A) 2.60 (A)   Total Bilirubin 2.5 (A)  2.6 (A) 1.7 (A)   Alkaline Phosphatase 337 (A)  392 (A) 582 (A)   AST (SGOT) 301 (A)  174 (A) 248 (A)   ALT (SGPT) 194 (A)  150 (A) 179 (A)   (A) Abnormal value              [x]  Microbiology  [x]  Radiology  []  EKG/Telemetry   []  Cardiology/Vascular   []  Pathology  [x]  Old records  []  Other:    Assessment & Plan   Assessment / Plan     Assessment/Plan:  Urinary tract infection  Altered mental status  Dementia  Atrial fibrillation  Lactic acidosis, resolved  Hypertension   Transaminitis  Hypokalemia    Patient remains admitted to the hospital for management of the above  No culture data, narrowing to Ancef, will complete a 5-day course of  antibiotics  Patient's been resumed on a home antihypertensive and beta-blocker, monitor heart rate blood pressure  Patient with persistently elevated liver enzymes, continue to trend for now and avoid hepatotoxic medications  Patient's CT notes diffuse hepatic steatosis with hepatomegaly, Gallbladder distended with no gallstones or acute cholecystitis  Right upper quadrant ultrasound pending  Replace potassium  Discussed with JONE Dietz from [facility] urology, had seen patient in the past.  Able to help get patient into urology office for quick follow-up  Trend renal function and electrolytes with a.m. CMP  Trend Hgb and WBC with a.m. CBC     Discussed plan with RN,     DVT prophylaxis:  Medical DVT prophylaxis orders are present.    CODE STATUS:   Medical Intervention Limits: NO intubation (DNI)  Code Status (Patient has no pulse and is not breathing): No CPR (Do Not Attempt to Resuscitate)  Medical Interventions (Patient has pulse or is breathing): Limited Support

## 2022-10-02 LAB
ALBUMIN SERPL-MCNC: 3.3 G/DL (ref 3.5–5.2)
ALBUMIN/GLOB SERPL: 0.9 G/DL
ALP SERPL-CCNC: 680 U/L (ref 39–117)
ALT SERPL W P-5'-P-CCNC: 117 U/L (ref 1–33)
ANION GAP SERPL CALCULATED.3IONS-SCNC: 11.3 MMOL/L (ref 5–15)
AST SERPL-CCNC: 83 U/L (ref 1–32)
BILIRUB SERPL-MCNC: 1.5 MG/DL (ref 0–1.2)
BUN SERPL-MCNC: 8 MG/DL (ref 8–23)
BUN/CREAT SERPL: 14.5 (ref 7–25)
CALCIUM SPEC-SCNC: 8.5 MG/DL (ref 8.6–10.5)
CHLORIDE SERPL-SCNC: 102 MMOL/L (ref 98–107)
CO2 SERPL-SCNC: 26.7 MMOL/L (ref 22–29)
CREAT SERPL-MCNC: 0.55 MG/DL (ref 0.57–1)
EGFRCR SERPLBLD CKD-EPI 2021: 87.7 ML/MIN/1.73
GLOBULIN UR ELPH-MCNC: 3.5 GM/DL
GLUCOSE SERPL-MCNC: 117 MG/DL (ref 65–99)
POTASSIUM SERPL-SCNC: 3.1 MMOL/L (ref 3.5–5.2)
PROT SERPL-MCNC: 6.8 G/DL (ref 6–8.5)
SODIUM SERPL-SCNC: 140 MMOL/L (ref 136–145)

## 2022-10-02 PROCEDURE — 25010000002 HEPARIN (PORCINE) PER 1000 UNITS: Performed by: INTERNAL MEDICINE

## 2022-10-02 PROCEDURE — 25010000002 CEFAZOLIN 1-4 GM/50ML-% SOLUTION: Performed by: INTERNAL MEDICINE

## 2022-10-02 PROCEDURE — 97110 THERAPEUTIC EXERCISES: CPT

## 2022-10-02 PROCEDURE — 99233 SBSQ HOSP IP/OBS HIGH 50: CPT | Performed by: INTERNAL MEDICINE

## 2022-10-02 PROCEDURE — 80053 COMPREHEN METABOLIC PANEL: CPT | Performed by: INTERNAL MEDICINE

## 2022-10-02 RX ORDER — LOSARTAN POTASSIUM 25 MG/1
100 TABLET ORAL
Status: DISCONTINUED | OUTPATIENT
Start: 2022-10-02 | End: 2022-10-04 | Stop reason: HOSPADM

## 2022-10-02 RX ORDER — CETIRIZINE HYDROCHLORIDE 10 MG/1
10 TABLET ORAL DAILY
Status: DISCONTINUED | OUTPATIENT
Start: 2022-10-02 | End: 2022-10-02

## 2022-10-02 RX ORDER — HYDROXYZINE PAMOATE 25 MG/1
25 CAPSULE ORAL DAILY
Status: DISCONTINUED | OUTPATIENT
Start: 2022-10-03 | End: 2022-10-04 | Stop reason: HOSPADM

## 2022-10-02 RX ADMIN — HEPARIN SODIUM 5000 UNITS: 5000 INJECTION INTRAVENOUS; SUBCUTANEOUS at 20:59

## 2022-10-02 RX ADMIN — CEFAZOLIN SODIUM 1 G: 1 INJECTION, SOLUTION INTRAVENOUS at 04:21

## 2022-10-02 RX ADMIN — ASPIRIN 81 MG: 81 TABLET, COATED ORAL at 09:06

## 2022-10-02 RX ADMIN — CEFAZOLIN SODIUM 1 G: 1 INJECTION, SOLUTION INTRAVENOUS at 13:39

## 2022-10-02 RX ADMIN — LOSARTAN POTASSIUM 100 MG: 50 TABLET, FILM COATED ORAL at 09:05

## 2022-10-02 RX ADMIN — HEPARIN SODIUM 5000 UNITS: 5000 INJECTION INTRAVENOUS; SUBCUTANEOUS at 09:05

## 2022-10-02 RX ADMIN — HYDROXYZINE PAMOATE 25 MG: 25 CAPSULE ORAL at 09:06

## 2022-10-02 RX ADMIN — TIMOLOL MALEATE 1 DROP: 5 SOLUTION/ DROPS OPHTHALMIC at 09:06

## 2022-10-02 RX ADMIN — METOPROLOL SUCCINATE 25 MG: 25 TABLET, EXTENDED RELEASE ORAL at 06:03

## 2022-10-02 RX ADMIN — CEFAZOLIN SODIUM 1 G: 1 INJECTION, SOLUTION INTRAVENOUS at 20:57

## 2022-10-02 RX ADMIN — Medication 10 ML: at 09:06

## 2022-10-02 RX ADMIN — METOPROLOL SUCCINATE 50 MG: 50 TABLET, EXTENDED RELEASE ORAL at 20:58

## 2022-10-02 RX ADMIN — DONEPEZIL HYDROCHLORIDE 5 MG: 5 TABLET ORAL at 20:58

## 2022-10-02 RX ADMIN — Medication 10 ML: at 20:58

## 2022-10-02 NOTE — PLAN OF CARE
Goal Outcome Evaluation: Patient resting no major events overnight. Call light in reach. Will continue to monitor

## 2022-10-02 NOTE — THERAPY TREATMENT NOTE
Acute Care - Physical Therapy Treatment Note  DAVID Thompson     Patient Name: Pati Leon  : 1933  MRN: [id number]  Today's Date: 10/2/2022      Visit Dx:     ICD-10-CM ICD-9-CM   1. Sepsis, due to unspecified organism, unspecified whether acute organ dysfunction present (HCC)  A41.9 038.9     995.91   2. Dysphagia, oropharyngeal  R13.12 787.22   3. Difficulty walking  R26.2 719.7     Patient Active Problem List   Diagnosis   • Age-related macular degeneration   • Arthritis   • Cataract   • Dementia (HCC)   • Essential hypertension   • Hyperlipidemia   • Malignant melanoma of skin (HCC)   • Encounter for therapeutic drug monitoring   • Tricuspid regurgitation   • Chronic UTI (urinary tract infection)   • Lethargic   • Sepsis, due to unspecified organism, unspecified whether acute organ dysfunction present (HCC)     Past Medical History:   Diagnosis Date   • Age-related macular degeneration 2018   • Arthritis    • Cataract    • Dementia (HCC)    • Essential hypertension 2018   • Hemorrhoids    • Hip fracture (HCC) 2019    Right, Dr. Orta   • HLD (hyperlipidemia) 2018   • Macular degeneration    • Malignant melanoma of skin (HCC)    • Palpitations    • Tricuspid regurgitation      Past Surgical History:   Procedure Laterality Date   •  SECTION     • HIP FRACTURE SURGERY Right 2019    Dr. Orta   • OTHER SURGICAL HISTORY      Joint Surgery   • SKIN CANCER EXCISION      Melanoma     PT Assessment (last 12 hours)     PT Evaluation and Treatment     Row Name 10/02/22 1400          Physical Therapy Time and Intention    Subjective Information complains of;fatigue;weakness  -CS     Document Type therapy note (daily note)  -CS     Mode of Treatment individual therapy;physical therapy  -CS     Patient Effort fair  -CS     Symptoms Noted During/After Treatment none  -CS     Row Name 10/02/22 1400          Transfers    Comment, (Transfers) Pt declined moving from the bed  stating she was just too tired today.  -CS     Row Name 10/02/22 1400          Safety Issues, Functional Mobility    Impairments Affecting Function (Mobility) balance;endurance/activity tolerance;strength;postural/trunk control;cognition  -CS     Row Name 10/02/22 1400          Motor Skills    Therapeutic Exercise hip;knee;ankle  -CS     Row Name 10/02/22 1400          Plan of Care Review    Plan of Care Reviewed With patient  -CS     Progress no change  -CS     Outcome Evaluation Continue plan of care.  -CS     Row Name 10/02/22 1400          Progress Summary (PT)    Progress Toward Functional Goals (PT) progress toward functional goals is fair  -CS           User Key  (r) = Recorded By, (t) = Taken By, (c) = Cosigned By    Initials Name Provider Type    Blanka Avila, PT Physical Therapist                 Bilateral Lower Extremity : AAROM BLEs  Exercise  Reps  Sets    Heel slides  10 1   Ankle pumps  10 1   Straight leg raise  10 1   Hip ab/adduction 10 1      Physical Therapy Education                 Title: PT OT SLP Therapies (Done)     Topic: Physical Therapy (Done)     Point: Mobility training (Done)     Learning Progress Summary           Patient Acceptance, E,TB, VU by MD at 10/2/2022 0929    Acceptance, E,TB, VU by MD at 10/1/2022 1314    Acceptance, E, VU by GINA at 9/29/2022 1452                   Point: Home exercise program (Done)     Learning Progress Summary           Patient Acceptance, E,TB, VU by MD at 10/2/2022 0929    Acceptance, E,TB, VU by MD at 10/1/2022 1314    Acceptance, E, VU by GINA at 9/29/2022 1452                   Point: Body mechanics (Done)     Learning Progress Summary           Patient Acceptance, E,TB, VU by MD at 10/2/2022 0929    Acceptance, E,TB, VU by MD at 10/1/2022 1314    Acceptance, E, VU by GINA at 9/29/2022 1452                   Point: Precautions (Done)     Learning Progress Summary           Patient Acceptance, E,TB, VU by MD at 10/2/2022 0929    Acceptance, E,TB, VU  by MD at 10/1/2022 1314    Acceptance, E, VU by GINA at 9/29/2022 1452                               User Key     Initials Effective Dates Name Provider Type Discipline    MD 01/17/22 -  Debbie Charles, RN Registered Nurse Nurse    GINA 08/31/22 -  Babita Rangel, PT Student PT Student PT              PT Recommendation and Plan     Progress Summary (PT)  Progress Toward Functional Goals (PT): progress toward functional goals is fair  Plan of Care Reviewed With: patient  Progress: no change  Outcome Evaluation: Continue plan of care.   Outcome Measures     Row Name 09/30/22 1100             How much help from another person do you currently need...    Turning from your back to your side while in flat bed without using bedrails? 2  -CORBY (r) BN (t) CORBY (c)      Moving from lying on back to sitting on the side of a flat bed without bedrails? 2  -CORBY (r) BN (t) CORBY (c)      Moving to and from a bed to a chair (including a wheelchair)? 2  -CORBY (r) BN (t) CORBY (c)      Standing up from a chair using your arms (e.g., wheelchair, bedside chair)? 2  -CORBY (r) BN (t) CORBY (c)      Climbing 3-5 steps with a railing? 1  -CORBY (r) BN (t) CORBY (c)      To walk in hospital room? 2  -CORBY (r) BN (t) CORBY (c)      AM-PAC 6 Clicks Score (PT) 11  -CORBY (r) BN (t)              Functional Assessment    Outcome Measure Options AM-PAC 6 Clicks Basic Mobility (PT)  -CORBY (r) BN (t) CORBY (c)            User Key  (r) = Recorded By, (t) = Taken By, (c) = Cosigned By    Initials Name Provider Type    CORBY Darien Gupta, PT Physical Therapist    Babita Valerio, PT Student PT Student                 Time Calculation:    PT Charges     Row Name 10/02/22 1453             Time Calculation    PT Received On 10/02/22  -CS              Timed Charges    40165 - PT Therapeutic Exercise Minutes 8  -CS              Total Minutes    Timed Charges Total Minutes 8  -CS       Total Minutes 8  -CS            User Key  (r) = Recorded By, (t) = Taken By, (c) = Cosigned By    Initials Name  Provider Type    CS Blanka Gamez, PT Physical Therapist              Therapy Charges for Today     Code Description Service Date Service Provider Modifiers Qty    93107487561 HC PT THER PROC EA 15 MIN 10/2/2022 Blanka Gamez, PT GP 1          PT G-Codes  Outcome Measure Options: AM-PAC 6 Clicks Basic Mobility (PT)  AM-PAC 6 Clicks Score (PT): 11    Blanka Gamez, PT  10/2/2022

## 2022-10-02 NOTE — PLAN OF CARE
Goal Outcome Evaluation:  Plan of Care Reviewed With: patient, daughter        Progress: no change  Outcome Evaluation: Patient remains alert to self only. No complaints of pain throughout the shift. Patient was a transfer from PCU today.Turned and repositioned frequently throughout the shift.

## 2022-10-02 NOTE — PROGRESS NOTES
[facility]   Hospitalist Progress Note  Date: 10/2/2022  Patient Name: Pati Leon  : 1933  MRN: [id number]  Date of admission: 2022      Subjective   Subjective     Chief Complaint:   Altered mental status    Summary:   Pati Leon is an 89 y.o. female with past medical history of dementia, recurrent urinary tract infections presents for altered mental status.  Daughter at bedside providing majority of history given patient's altered mentation.  Patient was recently tested positive for urinary tract infection as an outpatient, started on on outpatient antibiotics, believed to be Macrobid.  However given worsening mentation at home, daughter brought patient into the emergency department for evaluation.  ED work-up shows leukocytosis, elevated LFTs, patient was tachycardic.  CT abdomen and pelvis shows diffuse hepatic steatosis but otherwise unrevealing.  Patient was started on IV fluids and broad-spectrum antibiotics in the treatment of urinary tract infection resulting in encephalopathy.  Was able to discuss with daughter at bedside on 2022, given patient's dementia combined with urinary tract infections, daughter is aware that this will be is slowly continuing worsening of her baseline mentation.  PT/OT consulted and recommended rehab.    Interval Followup:   No acute events overnight.  Patient remains pleasantly confused.  Has no new complaints this morning other than feeling a bit sleepy.  Daughter is at bedside and states she typically eats breakfast and then takes a nap.  They are amenable to rehab.    Review of Systems   Attempted but difficult to obtain due to baseline confusion    Objective   Objective     Vitals:   Temp:  [97.2 °F (36.2 °C)-98.1 °F (36.7 °C)] 97.2 °F (36.2 °C)  Heart Rate:  [] 105  Resp:  [15-20] 20  BP: (157-180)/(74-90) 167/90  Physical Exam    Constitutional: Lying in bed, NAD, pleasantly confused   Eyes: Pupils equal, sclerae anicteric, no  conjunctival injection   HENT: NCAT, mucous membranes moist, hard of hearing   Neck: Supple, no thyromegaly, no lymphadenopathy, trachea midline   Respiratory: Clear to auscultation bilaterally, nonlabored respirations    Cardiovascular: Irregularly irregular, no murmurs, rubs, or gallops   Gastrointestinal: Positive bowel sounds, soft, nontender, nondistended   Musculoskeletal: No bilateral ankle edema, no clubbing or cyanosis to extremities   Neurologic: Oriented x 1, cranial nerves grossly intact, moves all 4 extremities   Skin: No rashes     Result Review    Result Review:  I have personally reviewed the results from 10/2/2022 and agree with these findings:  [x]  Laboratory  CBC    CBC 9/29/22 9/30/22 10/1/22   WBC 10.78 5.71 4.57   RBC 3.86 3.60 (A) 3.81   Hemoglobin 11.8 (A) 11.2 (A) 11.5 (A)   Hematocrit 35.1 33.6 (A) 35.1   MCV 90.9 93.3 92.1   MCH 30.6 31.1 30.2   MCHC 33.6 33.3 32.8   RDW 14.1 14.3 14.3   Platelets 150 132 (A) 161   (A) Abnormal value            CMP    CMP 9/30/22 9/30/22 10/1/22 10/2/22    0551 0551     Glucose 111 (A)  111 (A) 117 (A)   BUN 10  11 8   Creatinine 0.51 (A)  0.53 (A) 0.55 (A)   Sodium 139  139 140   Potassium 3.5  3.4 (A) 3.1 (A)   Chloride 105  107 102   Calcium 8.2 (A)  8.3 (A) 8.5 (A)   Albumin  2.80 (A) 2.60 (A) 3.30 (A)   Total Bilirubin  2.6 (A) 1.7 (A) 1.5 (A)   Alkaline Phosphatase  392 (A) 582 (A) 680 (A)   AST (SGOT)  174 (A) 248 (A) 83 (A)   ALT (SGPT)  150 (A) 179 (A) 117 (A)   (A) Abnormal value              [x]  Microbiology  [x]  Radiology  []  EKG/Telemetry   []  Cardiology/Vascular   []  Pathology  [x]  Old records  []  Other:    Assessment & Plan   Assessment / Plan     Assessment/Plan:  Urinary tract infection  Altered mental status  Dementia  Atrial fibrillation  Lactic acidosis, resolved  Hypertension   Transaminitis  Hypokalemia    Patient remains admitted to the hospital for management of the above  No culture data, narrowing to Ancef, will complete a  5-day course of antibiotics due to presumed UTI prior to admission  LFTs improving, she likely had a bump in her liver enzymes due to Macrobid.  This is now happened twice to her and she should avoid Macrobid going forward  Abdominal ultrasound reviewed, 4 mm gallbladder polyp, not large enough for surgical referral or removal.  Otherwise no cause of transaminitis noted on ultrasound  Continue to avoid hepatotoxic medications  Replace potassium  Discussed with JONE Dietz from [facility], [place], operated by [facility] urology, had seen patient in the past.  Able to help get patient into urology office for quick follow-up  PT/OT consulted, recommend rehab.  Social work aware working on placement.  Referrals have been sent  Trend renal function and electrolytes with a.m. CMP  Trend Hgb and WBC with a.m. CBC     Discussed plan with RN,     DVT prophylaxis:  Medical DVT prophylaxis orders are present.    CODE STATUS:   Medical Intervention Limits: NO intubation (DNI)  Code Status (Patient has no pulse and is not breathing): No CPR (Do Not Attempt to Resuscitate)  Medical Interventions (Patient has pulse or is breathing): Limited Support

## 2022-10-03 LAB
ALBUMIN SERPL-MCNC: 3.5 G/DL (ref 3.5–5.2)
ALBUMIN/GLOB SERPL: 0.9 G/DL
ALP SERPL-CCNC: 612 U/L (ref 39–117)
ALT SERPL W P-5'-P-CCNC: 63 U/L (ref 1–33)
ANION GAP SERPL CALCULATED.3IONS-SCNC: 11.3 MMOL/L (ref 5–15)
AST SERPL-CCNC: 34 U/L (ref 1–32)
BACTERIA SPEC AEROBE CULT: NORMAL
BILIRUB SERPL-MCNC: 1.1 MG/DL (ref 0–1.2)
BUN SERPL-MCNC: 11 MG/DL (ref 8–23)
BUN/CREAT SERPL: 18.6 (ref 7–25)
CALCIUM SPEC-SCNC: 9.1 MG/DL (ref 8.6–10.5)
CHLORIDE SERPL-SCNC: 101 MMOL/L (ref 98–107)
CO2 SERPL-SCNC: 26.7 MMOL/L (ref 22–29)
CREAT SERPL-MCNC: 0.59 MG/DL (ref 0.57–1)
EGFRCR SERPLBLD CKD-EPI 2021: 86.3 ML/MIN/1.73
GLOBULIN UR ELPH-MCNC: 3.7 GM/DL
GLUCOSE SERPL-MCNC: 133 MG/DL (ref 65–99)
POTASSIUM SERPL-SCNC: 2.9 MMOL/L (ref 3.5–5.2)
PROT SERPL-MCNC: 7.2 G/DL (ref 6–8.5)
SODIUM SERPL-SCNC: 139 MMOL/L (ref 136–145)

## 2022-10-03 PROCEDURE — 25010000002 CEFAZOLIN 1-4 GM/50ML-% SOLUTION: Performed by: INTERNAL MEDICINE

## 2022-10-03 PROCEDURE — 97166 OT EVAL MOD COMPLEX 45 MIN: CPT

## 2022-10-03 PROCEDURE — 99232 SBSQ HOSP IP/OBS MODERATE 35: CPT | Performed by: INTERNAL MEDICINE

## 2022-10-03 PROCEDURE — 80053 COMPREHEN METABOLIC PANEL: CPT | Performed by: INTERNAL MEDICINE

## 2022-10-03 PROCEDURE — 25010000002 HEPARIN (PORCINE) PER 1000 UNITS: Performed by: INTERNAL MEDICINE

## 2022-10-03 RX ORDER — METOPROLOL SUCCINATE 50 MG/1
50 TABLET, EXTENDED RELEASE ORAL
Status: DISCONTINUED | OUTPATIENT
Start: 2022-10-04 | End: 2022-10-04 | Stop reason: HOSPADM

## 2022-10-03 RX ORDER — CETIRIZINE HYDROCHLORIDE 10 MG/1
10 TABLET ORAL DAILY
Status: DISCONTINUED | OUTPATIENT
Start: 2022-10-03 | End: 2022-10-03 | Stop reason: ALTCHOICE

## 2022-10-03 RX ORDER — LORATADINE 10 MG/1
10 TABLET ORAL DAILY
Status: DISCONTINUED | OUTPATIENT
Start: 2022-10-04 | End: 2022-10-03

## 2022-10-03 RX ORDER — LORATADINE 10 MG/1
10 TABLET ORAL DAILY
Status: DISCONTINUED | OUTPATIENT
Start: 2022-10-03 | End: 2022-10-04 | Stop reason: HOSPADM

## 2022-10-03 RX ORDER — LORATADINE 10 MG/1
10 TABLET ORAL DAILY
Status: DISCONTINUED | OUTPATIENT
Start: 2022-10-03 | End: 2022-10-03

## 2022-10-03 RX ORDER — POTASSIUM CHLORIDE 750 MG/1
40 CAPSULE, EXTENDED RELEASE ORAL
Status: COMPLETED | OUTPATIENT
Start: 2022-10-03 | End: 2022-10-04

## 2022-10-03 RX ADMIN — Medication 10 MG: at 12:43

## 2022-10-03 RX ADMIN — DONEPEZIL HYDROCHLORIDE 5 MG: 5 TABLET ORAL at 23:22

## 2022-10-03 RX ADMIN — LATANOPROST 1 DROP: 50 SOLUTION OPHTHALMIC at 23:25

## 2022-10-03 RX ADMIN — HEPARIN SODIUM 5000 UNITS: 5000 INJECTION INTRAVENOUS; SUBCUTANEOUS at 23:22

## 2022-10-03 RX ADMIN — METOPROLOL SUCCINATE 50 MG: 50 TABLET, EXTENDED RELEASE ORAL at 23:22

## 2022-10-03 RX ADMIN — HEPARIN SODIUM 5000 UNITS: 5000 INJECTION INTRAVENOUS; SUBCUTANEOUS at 08:44

## 2022-10-03 RX ADMIN — HYDROXYZINE PAMOATE 25 MG: 25 CAPSULE ORAL at 08:46

## 2022-10-03 RX ADMIN — CEFAZOLIN SODIUM 1 G: 1 INJECTION, SOLUTION INTRAVENOUS at 07:28

## 2022-10-03 RX ADMIN — LOSARTAN POTASSIUM 100 MG: 50 TABLET, FILM COATED ORAL at 08:44

## 2022-10-03 RX ADMIN — Medication 10 ML: at 08:45

## 2022-10-03 RX ADMIN — TIMOLOL MALEATE 1 DROP: 5 SOLUTION/ DROPS OPHTHALMIC at 08:56

## 2022-10-03 RX ADMIN — Medication 10 ML: at 23:22

## 2022-10-03 RX ADMIN — METOPROLOL SUCCINATE 25 MG: 25 TABLET, EXTENDED RELEASE ORAL at 07:28

## 2022-10-03 RX ADMIN — ASPIRIN 81 MG: 81 TABLET, COATED ORAL at 08:44

## 2022-10-03 RX ADMIN — POTASSIUM CHLORIDE 40 MEQ: 10 CAPSULE, COATED, EXTENDED RELEASE ORAL at 18:01

## 2022-10-03 NOTE — PLAN OF CARE
Goal Outcome Evaluation:            Daughter visited today, updated on POC and medication changes.  PT takes pills crushed in A.S.  Cooperative.  Pt needs assistance with meals.  Refused to eat dinner, would only drink a few sips of ensure.

## 2022-10-03 NOTE — THERAPY EVALUATION
Patient Name: Pati Leon  : 1933    MRN: [id number]                              Today's Date: 10/3/2022       Admit Date: 2022    Visit Dx:     ICD-10-CM ICD-9-CM   1. Sepsis, due to unspecified organism, unspecified whether acute organ dysfunction present (HCC)  A41.9 038.9     995.91   2. Dysphagia, oropharyngeal  R13.12 787.22   3. Difficulty walking  R26.2 719.7   4. Decreased activities of daily living (ADL)  Z78.9 V49.89     Patient Active Problem List   Diagnosis   • Age-related macular degeneration   • Arthritis   • Cataract   • Dementia (HCC)   • Essential hypertension   • Hyperlipidemia   • Malignant melanoma of skin (HCC)   • Encounter for therapeutic drug monitoring   • Tricuspid regurgitation   • Chronic UTI (urinary tract infection)   • Lethargic   • Sepsis, due to unspecified organism, unspecified whether acute organ dysfunction present (HCC)     Past Medical History:   Diagnosis Date   • Age-related macular degeneration 2018   • Arthritis    • Cataract    • Dementia (HCC)    • Essential hypertension 2018   • Hemorrhoids    • Hip fracture (HCC) 2019    Right, Dr. Orta   • HLD (hyperlipidemia) 2018   • Macular degeneration    • Malignant melanoma of skin (HCC)    • Palpitations    • Tricuspid regurgitation      Past Surgical History:   Procedure Laterality Date   •  SECTION     • HIP FRACTURE SURGERY Right 2019    Dr. Orta   • OTHER SURGICAL HISTORY      Joint Surgery   • SKIN CANCER EXCISION      Melanoma      General Information     Row Name 10/03/22 1042          OT Time and Intention    Document Type evaluation  -AV     Mode of Treatment individual therapy;occupational therapy  -AV     Row Name 10/03/22 1042          General Information    Patient Profile Reviewed yes  -AV     Prior Level of Function independent:;ADL's;all household mobility  Primarily independent at home and ambulates with walker.  -AV     Existing  Precautions/Restrictions fall  -AV     Barriers to Rehab none identified  -AV     Row Name 10/03/22 1042          Occupational Profile    Reason for Services/Referral (Occupational Profile) Patient is an 89 year old female admitted to [facility] on 9/28/22 with altered mental status. She is currently on 4NT/ room air. OT consulted due to recent decline in ADL/transfer (I). No previous OT services for current condition.  -AV     Row Name 10/03/22 1042          Living Environment    People in Home --  family and/or caregivers  -AV     Row Name 10/03/22 1042          Home Main Entrance    Number of Stairs, Main Entrance one  -AV     Row Name 10/03/22 1042          Cognition    Orientation Status (Cognition) --  drowsy/ requires continuous cues for arousal. Able to follow commands with repeated directions required.  -AV     Row Name 10/03/22 1042          Safety Issues, Functional Mobility    Impairments Affecting Function (Mobility) balance;endurance/activity tolerance;strength;cognition  -AV           User Key  (r) = Recorded By, (t) = Taken By, (c) = Cosigned By    Initials Name Provider Type    Corey Rodriguez OT Occupational Therapist                 Mobility/ADL's     Row Name 10/03/22 1045          Transfers    Sit-Stand [place] (Transfers) moderate assist (50% patient effort)  -AV     Row Name 10/03/22 1045          Sit-Stand Transfer    Assistive Device (Sit-Stand Transfers) walker, front-wheeled  -AV     Row Name 10/03/22 1045          Activities of Daily Living    BADL Assessment/Intervention --  (I) feeding with setup. max-dependent further ADLs. Purewick catheter.  -AV           User Key  (r) = Recorded By, (t) = Taken By, (c) = Cosigned By    Initials Name Provider Type    Corey Rodriguez OT Occupational Therapist               Obj/Interventions     Row Name 10/03/22 1046          Sensory Assessment (Somatosensory)    Sensory Assessment (Somatosensory) UE sensation intact  -AV     Row  Name 10/03/22 1046          Vision Assessment/Intervention    Visual Impairment/Limitations --  macular degeneration/ glasses  -AV     Row Name 10/03/22 1046          Range of Motion Comprehensive    General Range of Motion bilateral upper extremity ROM WFL  AAROM  -AV     Row Name 10/03/22 1046          Strength Comprehensive (MMT)    Comment, General Manual Muscle Testing (MMT) Assessment 4-/5 Bilateral upper extremities  -AV     Row Name 10/03/22 1046          Motor Skills    Motor Skills coordination;functional endurance  -AV     Coordination --  not tested due to impaired level of arousal  -AV     Functional Endurance poor  -AV     Row Name 10/03/22 1046          Balance    Comment, Balance mod assist/RW  -AV           User Key  (r) = Recorded By, (t) = Taken By, (c) = Cosigned By    Initials Name Provider Type    Corey Rodriguez OT Occupational Therapist               Goals/Plan     Row Name 10/03/22 1049          Transfer Goal 1 (OT)    Activity/Assistive Device (Transfer Goal 1, OT) transfers, all;walker, rolling  -AV     [place] Level/Cues Needed (Transfer Goal 1, OT) contact guard required;minimum assist (75% or more patient effort);verbal cues required  -AV     Time Frame (Transfer Goal 1, OT) long term goal (LTG);10 days  -AV     Row Name 10/03/22 1049          Bathing Goal 1 (OT)    Activity/Device (Bathing Goal 1, OT) bathing skills, all  -AV     [place] Level/Cues Needed (Bathing Goal 1, OT) contact guard required;minimum assist (75% or more patient effort);set-up required;verbal cues required  -AV     Time Frame (Bathing Goal 1, OT) long term goal (LTG);10 days  -AV     Row Name 10/03/22 1049          Dressing Goal 1 (OT)    Activity/Device (Dressing Goal 1, OT) dressing skills, all  -AV     [place]/Cues Needed (Dressing Goal 1, OT) contact guard required;minimum assist (75% or more patient effort);set-up required;verbal cues required  -AV     Time Frame (Dressing Goal 1, OT) long  term goal (LTG);10 days  -AV     Row Name 10/03/22 1049          Toileting Goal 1 (OT)    Activity/Device (Toileting Goal 1, OT) toileting skills, all;raised toilet seat  -AV     [place] Level/Cues Needed (Toileting Goal 1, OT) contact guard required;minimum assist (75% or more patient effort);set-up required;verbal cues required  -AV     Time Frame (Toileting Goal 1, OT) long term goal (LTG);10 days  -AV     Row Name 10/03/22 1049          Grooming Goal 1 (OT)    Activity/Device (Grooming Goal 1, OT) grooming skills, all  -AV     [place] (Grooming Goal 1, OT) contact guard required;minimum assist (75% or more patient effort);set-up required;verbal cues required  -AV     Time Frame (Grooming Goal 1, OT) long term goal (LTG);10 days  -AV     Row Name 10/03/22 1049          Strength Goal 1 (OT)    Strength Goal 1 (OT) Patient will demonstrate 4/5 bilateral upper extremities to increase ADL/transfer independence.  -AV     Time Frame (Strength Goal 1, OT) long term goal (LTG);10 days  -AV     Row Name 10/03/22 1049          Problem Specific Goal 1 (OT)    Problem Specific Goal 1 (OT) Patient will demonstrate fair endurance/ activity tolerance needed to support ADLs.  -AV     Time Frame (Problem Specific Goal 1, OT) long term goal (LTG);10 days  -AV     Row Name 10/03/22 1049          Therapy Assessment/Plan (OT)    Planned Therapy Interventions (OT) activity tolerance training;BADL retraining;functional balance retraining;occupation/activity based interventions;patient/caregiver education/training;transfer/mobility retraining;strengthening exercise  -AV           User Key  (r) = Recorded By, (t) = Taken By, (c) = Cosigned By    Initials Name Provider Type    Corey Rodriguez, OT Occupational Therapist               Clinical Impression     Row Name 10/03/22 1047          Pain Assessment    Additional Documentation Pain Scale: FACES Pre/Post-Treatment (Group)  -AV     Row Name 10/03/22 1047          Pain Scale:  FACES Pre/Post-Treatment    Pain: FACES Scale, Pretreatment 0-->no hurt  -AV     Posttreatment Pain Rating 0-->no hurt  -AV     Row Name 10/03/22 1047          Plan of Care Review    Plan of Care Reviewed With patient  -AV     Progress no change  first session: evaluation  -AV     Outcome Evaluation Patient presents with limitations of cognition/ level of arousal, balance, strength and endurance/ activity tolerance impacting ADL/transfer independence. OT is needed to remediate/compensate for deficits to maximize independence/ safety with functional tasks.  -AV     Row Name 10/03/22 1047          Therapy Assessment/Plan (OT)    Patient/Family Therapy Goal Statement (OT) none stated  -AV     Rehab Potential (OT) good, to achieve stated therapy goals  -AV     Criteria for Skilled Therapeutic Interventions Met (OT) yes;meets criteria;skilled treatment is necessary  -AV     Therapy Frequency (OT) 5 times/wk  -AV     Row Name 10/03/22 1047          Therapy Plan Review/Discharge Plan (OT)    Anticipated Discharge Disposition (OT) sub acute care setting  -AV     Row Name 10/03/22 1047          Vital Signs    O2 Delivery Pre Treatment room air  -AV     O2 Delivery Intra Treatment room air  -AV     O2 Delivery Post Treatment room air  -AV           User Key  (r) = Recorded By, (t) = Taken By, (c) = Cosigned By    Initials Name Provider Type    Corey Rodriguez, OT Occupational Therapist               Outcome Measures     Row Name 10/03/22 1050          How much help from another is currently needed...    Putting on and taking off regular lower body clothing? 1  -AV     Bathing (including washing, rinsing, and drying) 2  -AV     Toileting (which includes using toilet bed pan or urinal) 1  -AV     Putting on and taking off regular upper body clothing 2  -AV     Taking care of personal grooming (such as brushing teeth) 2  -AV     Eating meals 4  -AV     AM-PAC 6 Clicks Score (OT) 12  -AV     Row Name 10/03/22 105           Functional Assessment    Outcome Measure Options AM-PAC 6 Clicks Daily Activity (OT);Optimal Instrument  -AV     Row Name 10/03/22 1050          Optimal Instrument    Optimal Instrument Optimal - 3  -AV     Bending/Stooping 3  -AV     Standing 3  -AV     Reaching 2  -AV     From the list, choose the 3 activities you would most like to be able to do without any difficulty Bending/stooping;Standing;Reaching  -AV     Total Score Optimal - 3 8  -AV           User Key  (r) = Recorded By, (t) = Taken By, (c) = Cosigned By    Initials Name Provider Type    Corey Rodriguez OT Occupational Therapist                Occupational Therapy Education                 Title: PT OT SLP Therapies (Done)     Topic: Occupational Therapy (Done)     Point: ADL training (Done)     Description:   Instruct learner(s) on proper safety adaptation and remediation techniques during self care or transfers.   Instruct in proper use of assistive devices.              Learning Progress Summary           Patient Acceptance, E, VU by AV at 10/3/2022 1051                   Point: Home exercise program (Done)     Description:   Instruct learner(s) on appropriate technique for monitoring, assisting and/or progressing therapeutic exercises/activities.              Learning Progress Summary           Patient Acceptance, E, VU by AV at 10/3/2022 1051                   Point: Precautions (Done)     Description:   Instruct learner(s) on prescribed precautions during self-care and functional transfers.              Learning Progress Summary           Patient Acceptance, E, VU by AV at 10/3/2022 1051                   Point: Body mechanics (Done)     Description:   Instruct learner(s) on proper positioning and spine alignment during self-care, functional mobility activities and/or exercises.              Learning Progress Summary           Patient Acceptance, E, VU by AV at 10/3/2022 1051                               User Key     Initials Effective Dates  Name Provider Type Discipline    AV 06/16/21 -  Corey Espino OT Occupational Therapist OT              OT Recommendation and Plan  Planned Therapy Interventions (OT): activity tolerance training, BADL retraining, functional balance retraining, occupation/activity based interventions, patient/caregiver education/training, transfer/mobility retraining, strengthening exercise  Therapy Frequency (OT): 5 times/wk  Plan of Care Review  Plan of Care Reviewed With: patient  Progress: no change (first session: evaluation)  Outcome Evaluation: Patient presents with limitations of cognition/ level of arousal, balance, strength and endurance/ activity tolerance impacting ADL/transfer independence. OT is needed to remediate/compensate for deficits to maximize independence/ safety with functional tasks.     Time Calculation:    Time Calculation- OT     Row Name 10/03/22 1052             Time Calculation- OT    OT Received On 10/03/22  -AV      OT Goal Re-Cert Due Date 10/12/22  -AV              Untimed Charges    OT Eval/Re-eval Minutes 35  -AV              Total Minutes    Untimed Charges Total Minutes 35  -AV       Total Minutes 35  -AV            User Key  (r) = Recorded By, (t) = Taken By, (c) = Cosigned By    Initials Name Provider Type    AV Corey Espino OT Occupational Therapist              Therapy Charges for Today     Code Description Service Date Service Provider Modifiers Qty    20754858510 HC OT EVAL MOD COMPLEXITY 3 10/3/2022 Corey Espino OT GO 1               Corey Espino OT  10/3/2022

## 2022-10-03 NOTE — PLAN OF CARE
Goal Outcome Evaluation:  Plan of Care Reviewed With: patient        Progress: no change  Outcome Evaluation: PT A/OX1, RESTING WITH EYES CLOSED, BREATHING STEADILY. NO SIGNS OF DISTRESS OR DISCOMFORT AT THIS TIME

## 2022-10-03 NOTE — PROGRESS NOTES
[facility]   Hospitalist Progress Note  Date: 10/3/2022  Patient Name: Pati Leon  : 1933  MRN: [id number]  Date of admission: 2022      Subjective   Subjective     Chief Complaint:   Altered mental status    Summary:   Pati Leon is an 89 y.o. female with past medical history of dementia, recurrent urinary tract infections presents for altered mental status.  Daughter at bedside providing majority of history given patient's altered mentation.  Patient was recently tested positive for urinary tract infection as an outpatient, started on on outpatient antibiotics, believed to be Macrobid.  However given worsening mentation at home, daughter brought patient into the emergency department for evaluation.  ED work-up shows leukocytosis, elevated LFTs, patient was tachycardic.  CT abdomen and pelvis shows diffuse hepatic steatosis but otherwise unrevealing.  Patient was started on IV fluids and broad-spectrum antibiotics in the treatment of urinary tract infection resulting in encephalopathy.  Was able to discuss with daughter at bedside on 2022, given patient's dementia combined with urinary tract infections, daughter is aware that this will be is slowly continuing worsening of her baseline mentation.  PT/OT consulted and recommended rehab.  Now awaiting rehab.     Interval Followup:   No acute events overnight.  Remains at her baseline confusion.  Her daughter brought in her home Claritin and is hoping we can restart this.  Zyrtec apparently has had a bad reaction in the past.  Patient is confused but denies any chest pain or shortness of air.  She states she slept well last night.    Review of Systems   Attempted but difficult to obtain due to baseline confusion    Objective   Objective     Vitals:   Temp:  [97.3 °F (36.3 °C)-98.3 °F (36.8 °C)] 97.3 °F (36.3 °C)  Heart Rate:  [] 130  Resp:  [16] 16  BP: (141-179)/(84-92) 141/92  Physical Exam    Constitutional: Resting in bed, NAD,  pleasantly confused   Eyes: Pupils equal, sclerae anicteric, no conjunctival injection   HENT: NCAT, mucous membranes moist, hard of hearing   Neck: Supple, no thyromegaly, no lymphadenopathy, trachea midline   Respiratory: Clear to auscultation bilaterally, nonlabored respirations    Cardiovascular: Irregularly irregular, no murmurs, rubs, or gallops   Gastrointestinal: Positive bowel sounds, soft, nontender, nondistended   Musculoskeletal: No bilateral ankle edema, no clubbing or cyanosis to extremities   Neurologic: Oriented x 1, cranial nerves grossly intact, moves all 4 extremities   Skin: No rashes     Result Review    Result Review:  I have personally reviewed the results from 10/3/2022 and agree with these findings:  [x]  Laboratory  CBC    CBC 9/29/22 9/30/22 10/1/22   WBC 10.78 5.71 4.57   RBC 3.86 3.60 (A) 3.81   Hemoglobin 11.8 (A) 11.2 (A) 11.5 (A)   Hematocrit 35.1 33.6 (A) 35.1   MCV 90.9 93.3 92.1   MCH 30.6 31.1 30.2   MCHC 33.6 33.3 32.8   RDW 14.1 14.3 14.3   Platelets 150 132 (A) 161   (A) Abnormal value            CMP    CMP 10/1/22 10/2/22 10/3/22   Glucose 111 (A) 117 (A) 133 (A)   BUN 11 8 11   Creatinine 0.53 (A) 0.55 (A) 0.59   Sodium 139 140 139   Potassium 3.4 (A) 3.1 (A) 2.9 (A)   Chloride 107 102 101   Calcium 8.3 (A) 8.5 (A) 9.1   Albumin 2.60 (A) 3.30 (A) 3.50   Total Bilirubin 1.7 (A) 1.5 (A) 1.1   Alkaline Phosphatase 582 (A) 680 (A) 612 (A)   AST (SGOT) 248 (A) 83 (A) 34 (A)   ALT (SGPT) 179 (A) 117 (A) 63 (A)   (A) Abnormal value              [x]  Microbiology  [x]  Radiology  []  EKG/Telemetry   []  Cardiology/Vascular   []  Pathology  [x]  Old records  []  Other:    Assessment & Plan   Assessment / Plan     Assessment/Plan:  Urinary tract infection  Altered mental status  Dementia  Atrial fibrillation  Lactic acidosis, resolved  Hypertension   Transaminitis  Hypokalemia    Patient remains admitted to the hospital for management of the above  No culture data, narrowing to Ancef,  will complete a 5-day course of antibiotics due to presumed UTI prior to admission  LFTs improving, she likely had a bump in her liver enzymes due to Macrobid.  This has now happened twice to her and she should avoid Macrobid going forward  Abdominal ultrasound reviewed, 4 mm gallbladder polyp, not large enough for surgical referral or removal  Continue to avoid hepatotoxic medications  Schedule oral potassium 3 times daily due to K of 2.9  Discussed with JONE Dietz from [facility] urology, had seen patient in the past.  Able to help get patient into urology office for quick follow-up  PT/OT consulted, recommend rehab.  Social work aware working on placement.  Referrals have been sent  Trend renal function and electrolytes with a.m. CMP, magnesium, phosphorus  Trend Hgb and WBC with a.m. CBC     Discussed plan with RN,     DVT prophylaxis:  Medical DVT prophylaxis orders are present.    CODE STATUS:   Medical Intervention Limits: NO intubation (DNI)  Code Status (Patient has no pulse and is not breathing): No CPR (Do Not Attempt to Resuscitate)  Medical Interventions (Patient has pulse or is breathing): Limited Support

## 2022-10-03 NOTE — PLAN OF CARE
Goal Outcome Evaluation:  Plan of Care Reviewed With: patient        Progress: no change (first session: evaluation)  Outcome Evaluation: Patient presents with limitations of cognition/ level of arousal, balance, strength and endurance/ activity tolerance impacting ADL/transfer independence. OT is needed to remediate/compensate for deficits to maximize independence/ safety with functional tasks.    DC recommendation: sub-acute rehab

## 2022-10-04 VITALS
RESPIRATION RATE: 20 BRPM | HEART RATE: 94 BPM | DIASTOLIC BLOOD PRESSURE: 74 MMHG | TEMPERATURE: 97.7 F | BODY MASS INDEX: 22.88 KG/M2 | HEIGHT: 65 IN | OXYGEN SATURATION: 97 % | SYSTOLIC BLOOD PRESSURE: 155 MMHG | WEIGHT: 137.35 LBS

## 2022-10-04 LAB
ALBUMIN SERPL-MCNC: 3 G/DL (ref 3.5–5.2)
ALBUMIN/GLOB SERPL: 0.9 G/DL
ALP SERPL-CCNC: 495 U/L (ref 39–117)
ALT SERPL W P-5'-P-CCNC: 38 U/L (ref 1–33)
ANION GAP SERPL CALCULATED.3IONS-SCNC: 10.6 MMOL/L (ref 5–15)
AST SERPL-CCNC: 27 U/L (ref 1–32)
BASOPHILS # BLD AUTO: 0.02 10*3/MM3 (ref 0–0.2)
BASOPHILS NFR BLD AUTO: 0.3 % (ref 0–1.5)
BILIRUB SERPL-MCNC: 0.8 MG/DL (ref 0–1.2)
BUN SERPL-MCNC: 17 MG/DL (ref 8–23)
BUN/CREAT SERPL: 30.9 (ref 7–25)
CALCIUM SPEC-SCNC: 8.6 MG/DL (ref 8.6–10.5)
CHLORIDE SERPL-SCNC: 105 MMOL/L (ref 98–107)
CO2 SERPL-SCNC: 26.4 MMOL/L (ref 22–29)
CREAT SERPL-MCNC: 0.55 MG/DL (ref 0.57–1)
DEPRECATED RDW RBC AUTO: 48.1 FL (ref 37–54)
EGFRCR SERPLBLD CKD-EPI 2021: 87.7 ML/MIN/1.73
EOSINOPHIL # BLD AUTO: 0.18 10*3/MM3 (ref 0–0.4)
EOSINOPHIL NFR BLD AUTO: 2.5 % (ref 0.3–6.2)
ERYTHROCYTE [DISTWIDTH] IN BLOOD BY AUTOMATED COUNT: 14.2 % (ref 12.3–15.4)
GLOBULIN UR ELPH-MCNC: 3.5 GM/DL
GLUCOSE SERPL-MCNC: 122 MG/DL (ref 65–99)
HCT VFR BLD AUTO: 37.9 % (ref 34–46.6)
HGB BLD-MCNC: 12.6 G/DL (ref 12–15.9)
IMM GRANULOCYTES # BLD AUTO: 0.13 10*3/MM3 (ref 0–0.05)
IMM GRANULOCYTES NFR BLD AUTO: 1.8 % (ref 0–0.5)
LYMPHOCYTES # BLD AUTO: 1.71 10*3/MM3 (ref 0.7–3.1)
LYMPHOCYTES NFR BLD AUTO: 24.1 % (ref 19.6–45.3)
MAGNESIUM SERPL-MCNC: 1.9 MG/DL (ref 1.6–2.4)
MCH RBC QN AUTO: 30.6 PG (ref 26.6–33)
MCHC RBC AUTO-ENTMCNC: 33.2 G/DL (ref 31.5–35.7)
MCV RBC AUTO: 92 FL (ref 79–97)
MONOCYTES # BLD AUTO: 0.78 10*3/MM3 (ref 0.1–0.9)
MONOCYTES NFR BLD AUTO: 11 % (ref 5–12)
NEUTROPHILS NFR BLD AUTO: 4.29 10*3/MM3 (ref 1.7–7)
NEUTROPHILS NFR BLD AUTO: 60.3 % (ref 42.7–76)
NRBC BLD AUTO-RTO: 0 /100 WBC (ref 0–0.2)
PHOSPHATE SERPL-MCNC: 3.8 MG/DL (ref 2.5–4.5)
PLATELET # BLD AUTO: 202 10*3/MM3 (ref 140–450)
PMV BLD AUTO: 10 FL (ref 6–12)
POTASSIUM SERPL-SCNC: 3.5 MMOL/L (ref 3.5–5.2)
PROT SERPL-MCNC: 6.5 G/DL (ref 6–8.5)
RBC # BLD AUTO: 4.12 10*6/MM3 (ref 3.77–5.28)
SODIUM SERPL-SCNC: 142 MMOL/L (ref 136–145)
WBC NRBC COR # BLD: 7.11 10*3/MM3 (ref 3.4–10.8)

## 2022-10-04 PROCEDURE — 84100 ASSAY OF PHOSPHORUS: CPT | Performed by: INTERNAL MEDICINE

## 2022-10-04 PROCEDURE — 99239 HOSP IP/OBS DSCHRG MGMT >30: CPT | Performed by: INTERNAL MEDICINE

## 2022-10-04 PROCEDURE — 83735 ASSAY OF MAGNESIUM: CPT | Performed by: INTERNAL MEDICINE

## 2022-10-04 PROCEDURE — 85025 COMPLETE CBC W/AUTO DIFF WBC: CPT | Performed by: INTERNAL MEDICINE

## 2022-10-04 PROCEDURE — 97110 THERAPEUTIC EXERCISES: CPT

## 2022-10-04 PROCEDURE — 25010000002 HEPARIN (PORCINE) PER 1000 UNITS: Performed by: INTERNAL MEDICINE

## 2022-10-04 PROCEDURE — 80053 COMPREHEN METABOLIC PANEL: CPT | Performed by: INTERNAL MEDICINE

## 2022-10-04 RX ORDER — POTASSIUM CHLORIDE 750 MG/1
20 CAPSULE, EXTENDED RELEASE ORAL 2 TIMES DAILY
Qty: 2 CAPSULE | Refills: 0 | Status: SHIPPED | OUTPATIENT
Start: 2022-10-04 | End: 2022-10-05

## 2022-10-04 RX ORDER — NYSTATIN 100000 [USP'U]/G
POWDER TOPICAL 4 TIMES DAILY
Qty: 15 G | Refills: 1 | Status: SHIPPED | OUTPATIENT
Start: 2022-10-04

## 2022-10-04 RX ADMIN — POTASSIUM CHLORIDE 40 MEQ: 10 CAPSULE, COATED, EXTENDED RELEASE ORAL at 12:34

## 2022-10-04 RX ADMIN — ASPIRIN 81 MG: 81 TABLET, COATED ORAL at 09:23

## 2022-10-04 RX ADMIN — Medication 10 MG: at 09:22

## 2022-10-04 RX ADMIN — POTASSIUM CHLORIDE 40 MEQ: 10 CAPSULE, COATED, EXTENDED RELEASE ORAL at 09:23

## 2022-10-04 RX ADMIN — LOSARTAN POTASSIUM 100 MG: 50 TABLET, FILM COATED ORAL at 09:22

## 2022-10-04 RX ADMIN — Medication 10 ML: at 09:23

## 2022-10-04 RX ADMIN — METOPROLOL SUCCINATE 50 MG: 50 TABLET, EXTENDED RELEASE ORAL at 06:24

## 2022-10-04 RX ADMIN — ACETAMINOPHEN 650 MG: 325 TABLET ORAL at 09:22

## 2022-10-04 RX ADMIN — HEPARIN SODIUM 5000 UNITS: 5000 INJECTION INTRAVENOUS; SUBCUTANEOUS at 09:23

## 2022-10-04 RX ADMIN — HYDROXYZINE PAMOATE 25 MG: 25 CAPSULE ORAL at 09:23

## 2022-10-04 RX ADMIN — TIMOLOL MALEATE 1 DROP: 5 SOLUTION/ DROPS OPHTHALMIC at 09:22

## 2022-10-04 NOTE — THERAPY TREATMENT NOTE
Acute Care - Physical Therapy Treatment Note  DAVID Thompson     Patient Name: Pati Leon  : 1933  MRN: [id number]  Today's Date: 10/4/2022      Visit Dx:     ICD-10-CM ICD-9-CM   1. Sepsis, due to unspecified organism, unspecified whether acute organ dysfunction present (HCC)  A41.9 038.9     995.91   2. Dysphagia, oropharyngeal  R13.12 787.22   3. Difficulty walking  R26.2 719.7   4. Decreased activities of daily living (ADL)  Z78.9 V49.89     Patient Active Problem List   Diagnosis   • Age-related macular degeneration   • Arthritis   • Cataract   • Dementia (HCC)   • Essential hypertension   • Hyperlipidemia   • Malignant melanoma of skin (HCC)   • Encounter for therapeutic drug monitoring   • Tricuspid regurgitation   • Chronic UTI (urinary tract infection)   • Lethargic   • Sepsis, due to unspecified organism, unspecified whether acute organ dysfunction present (HCC)     Past Medical History:   Diagnosis Date   • Age-related macular degeneration 2018   • Arthritis    • Cataract    • Dementia (HCC)    • Essential hypertension 2018   • Hemorrhoids    • Hip fracture (HCC) 2019    Right, Dr. Orta   • HLD (hyperlipidemia) 2018   • Macular degeneration    • Malignant melanoma of skin (HCC)    • Palpitations    • Tricuspid regurgitation      Past Surgical History:   Procedure Laterality Date   •  SECTION     • HIP FRACTURE SURGERY Right 2019    Dr. Orta   • OTHER SURGICAL HISTORY      Joint Surgery   • SKIN CANCER EXCISION      Melanoma     PT Assessment (last 12 hours)     PT Evaluation and Treatment     Row Name 10/04/22 1126          Physical Therapy Time and Intention    Subjective Information no complaints  -DK     Document Type therapy note (daily note)  -DK     Mode of Treatment individual therapy;physical therapy  -DK     Patient Effort fair  -DK     Symptoms Noted During/After Treatment none  -DK     Comment Pt was very lethargic, dozing through  exercises.  -     Row Name 10/04/22 1126          Pain    Pretreatment Pain Rating 0/10 - no pain  -DK     Posttreatment Pain Rating 0/10 - no pain  -DK     Row Name 10/04/22 1126          Cognition    Affect/Mental Status (Cognition) low arousal/lethargic;confused;unable/difficult to assess  -DK     Behavioral Issues (Cognition) unable/difficult to assess  -DK     Orientation Status (Cognition) oriented to;person  -DK     Follows Commands (Cognition) WFL  -DK     Cognitive Function WFL  -DK     Row Name 10/04/22 1126          Motor Skills    Motor Skills --  therapeutic exercises  -DK     Coordination WFL  -DK     Therapeutic Exercise hip;knee;ankle  -DK     Additional Documentation --  No transfers due to pt lethargy.  -     Row Name 10/04/22 1126          Hip (Therapeutic Exercise)    Hip (Therapeutic Exercise) AAROM (active assistive range of motion)  -DK     Hip AAROM (Therapeutic Exercise) bilateral;flexion;extension;aBduction;aDduction;supine;10 repetitions;2 sets  -     Row Name 10/04/22 1126          Knee (Therapeutic Exercise)    Knee (Therapeutic Exercise) AAROM (active assistive range of motion)  -DK     Knee AAROM (Therapeutic Exercise) bilateral;flexion;extension;supine;10 repetitions;2 sets  -     Row Name 10/04/22 1126          Ankle (Therapeutic Exercise)    Ankle (Therapeutic Exercise) AAROM (active assistive range of motion)  -DK     Ankle AAROM (Therapeutic Exercise) bilateral;dorsiflexion;plantarflexion;supine;10 repetitions;2 sets  -     Row Name 10/04/22 1126          Plan of Care Review    Plan of Care Reviewed With patient  -DK     Progress no change  -     Row Name 10/04/22 1126          Positioning and Restraints    Pre-Treatment Position in bed  -DK     Post Treatment Position bed  -DK     In Bed supine;call light within reach;encouraged to call for assist;exit alarm on;with family/caregiver;side rails up x3;legs elevated;heels elevated  -     Row Name 10/04/22 1126           Therapy Assessment/Plan (PT)    Rehab Potential (PT) fair, will monitor progress closely  -DK     Criteria for Skilled Interventions Met (PT) skilled treatment is necessary  -DK     Therapy Frequency (PT) daily  -DK     Problem List (PT) problems related to;balance;cognition;mobility;strength;hearing  -DK     Activity Limitations Related to Problem List (PT) unable to ambulate safely;unable to transfer safely  -DK     Row Name 10/04/22 1126          Progress Summary (PT)    Progress Toward Functional Goals (PT) progress toward functional goals is fair  -DK           User Key  (r) = Recorded By, (t) = Taken By, (c) = Cosigned By    Initials Name Provider Type    Loraine Thibodeaux, PTA Physical Therapist Assistant                Physical Therapy Education                 Title: PT OT SLP Therapies (Done)     Topic: Physical Therapy (Done)     Point: Mobility training (Done)     Learning Progress Summary           Patient Acceptance, E, VU by SLICK at 10/3/2022 1051    Acceptance, E,TB, VU by MD at 10/2/2022 0929    Acceptance, E,TB, VU by MD at 10/1/2022 1314    Acceptance, E, VU by GINA at 9/29/2022 1452                   Point: Home exercise program (Done)     Learning Progress Summary           Patient Acceptance, E, VU by AV at 10/3/2022 1051    Acceptance, E,TB, VU by MD at 10/2/2022 0929    Acceptance, E,TB, VU by MD at 10/1/2022 1314    Acceptance, E, VU by BN at 9/29/2022 1452                   Point: Body mechanics (Done)     Learning Progress Summary           Patient Acceptance, E, VU by AV at 10/3/2022 1051    Acceptance, E,TB, VU by MD at 10/2/2022 0929    Acceptance, E,TB, VU by MD at 10/1/2022 1314    Acceptance, E, VU by BN at 9/29/2022 1452                   Point: Precautions (Done)     Learning Progress Summary           Patient Acceptance, E, VU by SLICK at 10/3/2022 1051    Acceptance, E,TB, VU by MD at 10/2/2022 0929    Acceptance, E,TB, VU by MD at 10/1/2022 1314    Acceptance, E, VU by BN at 9/29/2022  1452                               User Key     Initials Effective Dates Name Provider Type Discipline    AV 06/16/21 -  Corey Espino OT Occupational Therapist OT    MD 01/17/22 -  Debbie Charles RN Registered Nurse Nurse    BN 08/31/22 -  Babita Rangel, ESPERANZA Student PT Student PT              PT Recommendation and Plan  Planned Therapy Interventions (PT): balance training, bed mobility training, gait training, strengthening, transfer training  Therapy Frequency (PT): daily  Progress Summary (PT)  Progress Toward Functional Goals (PT): progress toward functional goals is fair  Plan of Care Reviewed With: patient  Progress: no change   Outcome Measures     Row Name 10/04/22 1126             How much help from another person do you currently need...    Turning from your back to your side while in flat bed without using bedrails? 1  -DK      Moving from lying on back to sitting on the side of a flat bed without bedrails? 1  -DK      Moving to and from a bed to a chair (including a wheelchair)? 1  -DK      Standing up from a chair using your arms (e.g., wheelchair, bedside chair)? 1  -DK      Climbing 3-5 steps with a railing? 1  -DK      To walk in hospital room? 1  -DK      AM-PAC 6 Clicks Score (PT) 6  -DK              Functional Assessment    Outcome Measure Options AM-PAC 6 Clicks Basic Mobility (PT)  -DK            User Key  (r) = Recorded By, (t) = Taken By, (c) = Cosigned By    Initials Name Provider Type    DK Loraine Baker, PTA Physical Therapist Assistant                 Time Calculation:    PT Charges     Row Name 10/04/22 1129             Time Calculation    PT Received On 10/04/22  -DK      PT Goal Re-Cert Due Date 10/08/22  -DK              Timed Charges    69500 - PT Therapeutic Exercise Minutes 14  -DK      56560 - PT Therapeutic Activity Minutes 4  -DK              Total Minutes    Timed Charges Total Minutes 18  -DK       Total Minutes 18  -DK            User Key  (r) = Recorded By, (t) = Taken By,  (c) = Cosigned By    Initials Name Provider Type    Loraine Thibodeaux PTA Physical Therapist Assistant              Therapy Charges for Today     Code Description Service Date Service Provider Modifiers Qty    44751994652 HC PT THER PROC EA 15 MIN 10/4/2022 Loraine Baker PTA GP 1          PT G-Codes  Outcome Measure Options: AM-PAC 6 Clicks Basic Mobility (PT)  AM-PAC 6 Clicks Score (PT): 6  AM-PAC 6 Clicks Score (OT): 12    Loraine Baker PTA  10/4/2022

## 2022-10-04 NOTE — PLAN OF CARE
Goal Outcome Evaluation:  Plan of Care Reviewed With: patient        Progress: no change  Outcome Evaluation: PT APPEARS TO BE RESTING IN BED WITH EYES CLOSED, BREATHING STEADILY. NO SIGNS OF DISCOMFORT AT THIS TIME

## 2022-10-04 NOTE — DISCHARGE SUMMARY
[facility]         HOSPITALIST  DISCHARGE SUMMARY    Patient Name: Pati Leon  : 1933  MRN: [id number]    Date of Admission: 2022  Date of Discharge:  10/4/2022  Primary Care Physician: Madeline Trammell APRN    Consults     Date and Time Order Name Status Description    2022  2:02 AM Hospitalist (on-call MD unless specified)            Active and Resolved Hospital Problems:  Urinary tract infection  Altered mental status  Dementia  Atrial fibrillation  Lactic acidosis, resolved  Hypertension   Transaminitis  Hypokalemia  Hospital Course     Hospital Course:  Pati Leon is a 89 y.o. female with past medical history of dementia, recurrent urinary tract infections presents for altered mental status.  Daughter at bedside providing majority of history given patient's altered mentation.  Patient was recently tested positive for urinary tract infection as an outpatient, started on on outpatient antibiotics, believed to be Macrobid.  However given worsening mentation at home, daughter brought patient into the emergency department for evaluation.  ED work-up shows leukocytosis, elevated LFTs, patient was tachycardic.  CT abdomen and pelvis shows diffuse hepatic steatosis but otherwise unrevealing.  Patient was started on IV fluids and broad-spectrum antibiotics in the treatment of urinary tract infection resulting in encephalopathy.  Was able to discuss with daughter at bedside on 2022, given patient's dementia combined with urinary tract infections, daughter is aware that this will be is slowly continuing worsening of her baseline mentation.  PT/OT consulted and recommended rehab.    Of note, patient's LFTs elevated, suspecting secondary to Macrobid.  Work-up in patient included abdominal ultrasound showing 4 mm gallbladder polyp, not large enough for surgical referral or removal.  Continue to avoid hepatotoxic medications.  Patient was started on scheduled potassium due  to persistently low potassium, patient's BMP should be monitored as an outpatient.  Patient completed course of IV Ancef inpatient.  Patient should follow-up with urology closely following discharge for recurrent urinary tract infections.  Patient seen on date of discharge, clinically and hemodynamically stable.  Patient provided concerning signs and symptoms prompting immediate medical attention, patient understanding and agreeable      DISCHARGE Follow Up Recommendations for labs and diagnostics:   Follow-up PCP soon as possible  Follow-up with urology outpatient for repeat urinary tract infections  Continue to follow patient's potassium, discharged on oral supplementation given persistently low potassium  Patient was discharged on course of topical nystatin for concerning of yeast infection      Day of Discharge     Vital Signs:  Vitals:    10/04/22 1110   BP: 136/77   Pulse: (!) 131   Resp: 16   Temp: 97.9 °F (36.6 °C)   SpO2: 98%     Physical Exam:               Constitutional: Resting in bed, NAD, pleasantly confused              Eyes: Pupils equal, sclerae anicteric, no conjunctival injection              HENT: NCAT, mucous membranes moist, hard of hearing              Neck: Supple, no thyromegaly, no lymphadenopathy, trachea midline              Respiratory: Clear to auscultation bilaterally, nonlabored respirations               Cardiovascular: Irregularly irregular, no murmurs, rubs, or gallops              Gastrointestinal: Positive bowel sounds, soft, nontender, nondistended              Musculoskeletal: No bilateral ankle edema, no clubbing or cyanosis to extremities              Neurologic: Oriented x 1, cranial nerves grossly intact, moves all 4 extremities              Skin: No rashes        Discharge Details        Discharge Medications      New Medications      Instructions Start Date   nystatin 560479 UNIT/GM powder  Commonly known as: MYCOSTATIN   Topical, 4 Times Daily      potassium chloride 10  MEQ CR capsule  Commonly known as: MICRO-K   20 mEq, Oral, 2 Times Daily         Changes to Medications      Instructions Start Date   metoprolol succinate XL 50 MG 24 hr tablet  Commonly known as: TOPROL-XL  What changed: additional instructions   1/2 tab am and 1 tab in evening         Continue These Medications      Instructions Start Date   acetaminophen 500 MG tablet  Commonly known as: TYLENOL   500 mg, Oral, Every Morning      acetaminophen 500 MG tablet  Commonly known as: TYLENOL   1,000 mg, Oral, Nightly      aspirin 81 MG EC tablet   81 mg, Oral, Daily      donepezil 5 MG tablet  Commonly known as: ARICEPT   5 mg, Oral, Every Night at Bedtime      hydrOXYzine pamoate 25 MG capsule  Commonly known as: VISTARIL   25 mg, Oral, Daily      loratadine 10 MG tablet  Commonly known as: CLARITIN   10 mg, Oral, Daily      losartan 50 MG tablet  Commonly known as: COZAAR   25 mg, Oral, Daily      Timolol Maleate (Once-Daily) 0.5 % solution   1 drop      travoprost (JUNE free) 0.004 % solution ophthalmic solution  Commonly known as: TRAVATAN   1 drop         Stop These Medications    nitrofurantoin (macrocrystal-monohydrate) 100 MG capsule  Commonly known as: MACROBID            Allergies   Allergen Reactions   • Sulfa Antibiotics Unknown - Low Severity   • Bactrim [Sulfamethoxazole-Trimethoprim] Hives   • Keflex [Cephalexin] Rash       Discharge Disposition:  Skilled Nursing Facility (DC - External)    Diet:  Hospital:  Diet Order   Procedures   • Diet Pureed; Elsa / Syrup Thick; Cardiac       Discharge Activity:       CODE STATUS:  Code Status and Medical Interventions:   Ordered at: 09/29/22 0240     Medical Intervention Limits:    NO intubation (DNI)     Code Status (Patient has no pulse and is not breathing):    No CPR (Do Not Attempt to Resuscitate)     Medical Interventions (Patient has pulse or is breathing):    Limited Support         Future Appointments   Date Time Provider Department Center   1/25/2023   1:45 PM Madeline Trammell APRN [facility]           Pertinent  and/or Most Recent Results     PROCEDURES:       LAB RESULTS:      Lab 10/04/22  0512 10/01/22  0438 09/30/22  0551 09/29/22  0459 09/29/22  0126 09/28/22 2017   WBC 7.11 4.57 5.71 10.78  --  15.15*   HEMOGLOBIN 12.6 11.5* 11.2* 11.8*  --  12.7   HEMATOCRIT 37.9 35.1 33.6* 35.1  --  37.8   PLATELETS 202 161 132* 150  --  181   NEUTROS ABS 4.29  --   --  9.52*  --  13.57*   IMMATURE GRANS (ABS) 0.13*  --   --  0.02  --  0.05   LYMPHS ABS 1.71  --   --  0.79  --  0.87   MONOS ABS 0.78  --   --  0.43  --  0.64   EOS ABS 0.18  --   --  0.01  --  0.00   MCV 92.0 92.1 93.3 90.9  --  90.6   LACTATE  --   --   --   --  2.0 2.2*         Lab 10/04/22  0512 10/03/22  1143 10/02/22  0735 10/01/22  0438 09/30/22  0551 09/29/22  0459 09/28/22 2017   SODIUM 142 139 140 139 139   < > 140   POTASSIUM 3.5 2.9* 3.1* 3.4* 3.5   < > 3.0*   CHLORIDE 105 101 102 107 105   < > 102   CO2 26.4 26.7 26.7 24.4 25.5   < > 25.9   ANION GAP 10.6 11.3 11.3 7.6 8.5   < > 12.1   BUN 17 11 8 11 10   < > 14   CREATININE 0.55* 0.59 0.55* 0.53* 0.51*   < > 0.67   EGFR 87.7 86.3 87.7 88.5 89.4   < > 83.7   GLUCOSE 122* 133* 117* 111* 111*   < > 152*   CALCIUM 8.6 9.1 8.5* 8.3* 8.2*   < > 9.2   MAGNESIUM 1.9  --   --  1.9 1.8  --  1.9   PHOSPHORUS 3.8  --   --   --   --   --   --     < > = values in this interval not displayed.         Lab 10/04/22  0512 10/03/22  1143 10/02/22  0735 10/01/22  0438 09/30/22  0551 09/29/22  0459   TOTAL PROTEIN 6.5 7.2 6.8 6.0 5.9* 6.7   ALBUMIN 3.00* 3.50 3.30* 2.60* 2.80* 3.30*   GLOBULIN 3.5 3.7 3.5 3.4  --  3.4   ALT (SGPT) 38* 63* 117* 179* 150* 194*   AST (SGOT) 27 34* 83* 248* 174* 301*   BILIRUBIN 0.8 1.1 1.5* 1.7* 2.6* 2.5*   INDIRECT BILIRUBIN  --   --   --   --  0.5  --    BILIRUBIN DIRECT  --   --   --   --  2.1*  --    ALK PHOS 495* 612* 680* 582* 392* 337*         Lab 09/28/22 2017   TROPONIN T <0.010                 Brief Urine Lab  Results  (Last result in the past 365 days)      Color   Clarity   Blood   Leuk Est   Nitrite   Protein   CREAT   Urine HCG        09/29/22 1847 Dark Yellow   Clear   Negative   Negative   Negative   30 mg/dL (1+)               Microbiology Results (last 10 days)     Procedure Component Value - Date/Time    Blood Culture - Blood, Arm, Left [[id number]]  (Normal) Collected: 09/28/22 2019    Lab Status: Final result Specimen: Blood from Arm, Left Updated: 10/03/22 2032     Blood Culture No growth at 5 days          CT Abdomen Pelvis With Contrast    Result Date: 9/29/2022  Impression:  No definite acute findings are appreciated.  Please see above comments for further detail.    Please note that portions of this note were completed with a voice recognition program.  AGUSTIN URENA JR, MD      Electronically Signed and Approved By: AGUSTIN URENA JR, MD on 9/29/2022 at 1:50         XR Chest 1 View    Result Date: 9/28/2022  Impression:   No acute infiltrate is appreciated.     Please note that portions of this note were completed with a voice recognition program.  AGUSTIN UERNA JR, MD       Electronically Signed and Approved By: AGUSTIN URENA JR, MD on 9/28/2022 at 20:38                            Labs Pending at Discharge:        Time spent on Discharge including face to face service:  36 minutes    Electronically signed by Daron Jaime MD, 10/04/22, 12:30 PM EDT.

## 2022-10-04 NOTE — PLAN OF CARE
Goal Outcome Evaluation:  Plan of Care Reviewed With: patient, daughter         Pt to be discharge to [facility], daughter at bedside.  EMS to transport.

## 2022-10-05 ENCOUNTER — TELEPHONE (OUTPATIENT)
Dept: FAMILY MEDICINE CLINIC | Facility: CLINIC | Age: 87
End: 2022-10-05

## 2022-10-05 NOTE — TELEPHONE ENCOUNTER
Patient daughter Yenni phone office requesting dates flu vaccine, COVID vaccine, pneumonia vaccine. Magui is going to [facility]. Yenni given dates from chart.

## 2022-10-25 ENCOUNTER — TELEPHONE (OUTPATIENT)
Dept: UROLOGY | Facility: CLINIC | Age: 87
End: 2022-10-25

## 2022-10-25 NOTE — TELEPHONE ENCOUNTER
CALLED PT DAUGHTER TO RS APPT TO [facility]/APRIL DOES NOT SEE FOR IC/[facility] HAS APPS AVAILABLE ON 10/26/22/LMOM

## 2022-10-25 NOTE — TELEPHONE ENCOUNTER
SPOKE TO PT'S DAUGHTER (RENE) TO R/S APPT WITH April TO [facility] AS April DOESN'T SEE FOR IC/MS

## 2022-10-26 ENCOUNTER — OFFICE VISIT (OUTPATIENT)
Dept: UROLOGY | Facility: CLINIC | Age: 87
End: 2022-10-26

## 2022-10-26 VITALS
DIASTOLIC BLOOD PRESSURE: 56 MMHG | SYSTOLIC BLOOD PRESSURE: 100 MMHG | HEART RATE: 91 BPM | WEIGHT: 113 LBS | TEMPERATURE: 98.4 F | BODY MASS INDEX: 19.29 KG/M2 | HEIGHT: 64 IN

## 2022-10-26 DIAGNOSIS — Z74.09: ICD-10-CM

## 2022-10-26 DIAGNOSIS — N89.5: ICD-10-CM

## 2022-10-26 DIAGNOSIS — Z74.09 IMPAIRED MOBILITY AND ACTIVITIES OF DAILY LIVING: ICD-10-CM

## 2022-10-26 DIAGNOSIS — F03.90 DEMENTIA, UNSPECIFIED DEMENTIA SEVERITY, UNSPECIFIED DEMENTIA TYPE, UNSPECIFIED WHETHER BEHAVIORAL, PSYCHOTIC, OR MOOD DISTURBANCE OR ANXIETY: ICD-10-CM

## 2022-10-26 DIAGNOSIS — N76.0 VULVOVAGINITIS: Primary | ICD-10-CM

## 2022-10-26 DIAGNOSIS — Z78.9 IMPAIRED MOBILITY AND ACTIVITIES OF DAILY LIVING: ICD-10-CM

## 2022-10-26 DIAGNOSIS — R39.81 URINARY INCONTINENCE DUE TO IMMOBILITY: ICD-10-CM

## 2022-10-26 DIAGNOSIS — Z87.440 HISTORY OF RECURRENT UTIS: ICD-10-CM

## 2022-10-26 DIAGNOSIS — E63.9 NUTRITIONAL PROBLEMS: ICD-10-CM

## 2022-10-26 PROBLEM — R33.9 INCOMPLETE EMPTYING OF BLADDER: Status: ACTIVE | Noted: 2022-10-26

## 2022-10-26 LAB
BILIRUB BLD-MCNC: NEGATIVE MG/DL
CLARITY, POC: CLEAR
COLOR UR: YELLOW
GLUCOSE UR STRIP-MCNC: NEGATIVE MG/DL
KETONES UR QL: NEGATIVE
LEUKOCYTE EST, POC: NEGATIVE
NITRITE UR-MCNC: NEGATIVE MG/ML
PH UR: 5 [PH] (ref 5–8)
PROT UR STRIP-MCNC: NEGATIVE MG/DL
RBC # UR STRIP: NEGATIVE /UL
SP GR UR: 1.02 (ref 1–1.03)
UROBILINOGEN UR QL: NORMAL

## 2022-10-26 PROCEDURE — 87086 URINE CULTURE/COLONY COUNT: CPT | Performed by: NURSE PRACTITIONER

## 2022-10-26 PROCEDURE — 81002 URINALYSIS NONAUTO W/O SCOPE: CPT | Performed by: NURSE PRACTITIONER

## 2022-10-26 PROCEDURE — 99215 OFFICE O/P EST HI 40 MIN: CPT | Performed by: NURSE PRACTITIONER

## 2022-10-26 RX ORDER — IBUPROFEN 400 MG/1
400 TABLET ORAL EVERY 6 HOURS PRN
COMMUNITY

## 2022-10-26 RX ORDER — QUETIAPINE FUMARATE 25 MG/1
25 TABLET, FILM COATED ORAL NIGHTLY
COMMUNITY

## 2022-10-26 RX ORDER — POTASSIUM CITRATE 5 MEQ/1
20 TABLET, EXTENDED RELEASE ORAL 2 TIMES DAILY WITH MEALS
COMMUNITY

## 2022-10-26 RX ORDER — FLUCONAZOLE 150 MG/1
150 TABLET ORAL ONCE
COMMUNITY

## 2022-10-26 NOTE — PROGRESS NOTES
"Chief Complaint  Recent hospitalization for sepsis  Severe weakness and decline in patient health believed due to untreated uti per  Daughter  History of recurrent uti past few years.   Subjective          Pati Leon 89 y.o. female presents to [facility]  History of Present Illness  Patient with history of recurrent UTI.  She was recently hospitalized and treated with IV fluids and antibiotics with diagnosis of sepsis.  Patient is currently at [facility] for rehab however daughter states discharged from therapy due to patient unable to participate.  Daughter is convinced that her mothers rapid decline in physical and cognitive health past few weeks  is due to untreated utis. Questioning if no uti in the past and urine cultures contaminated, why did mother improve with antibiotics. Patient has never complained of uti symptoms.No problems with fever.   Dissatisfied catalina upset that patient  Is not getting more antibiotics.  Daughter relayed that mother was doing well when taking Bactrim daily but it had to be stopped due to reaction. Patient with had another positive urine culture at the nursing facility and provided with antibiotic. Unable to get cath sample but did eventually get a voided sample.  Medication sheet from nursing home indicates treatment with Levaquin 250 mg.  Eventually we were able to get the culture results from the nursing home and it indicated E. coli.  Urinalysis indicated RBC, WBC, bacteria and yeast.  There was no yeast in final culture culture result. I suspected that this sample was contaminated based on physical assessment today. Patient apparently wears attends and not getting up to bathroom to void.  She also requires assistance with thickened liquids and eating.  Patient is very lethargic and minimal verbal response.  She is slumped over in the wheelchair. When asked it patient had improved after medication recent, daughter  unsure. \"Always seemed " "better after antibiotics in the past and\" if it was not an urinary infection in the past then why would she get better with antibiotics\"? .  Patient does have diarrhea with some fecal incontinence.  She was living at home with daughter.  Home health urine swab results obtained when very reviewing past medical history.  Urine ID molecular pathogen report collected on 10/8/2022 PER HOME HEALTH.Voided sample assumed. Report indicates presence of 8 organisms with majority of organisms consider normal part of skin manny and mucous membranes which logically indicates a contaminated voided urine sample. Resistance gene identified again bactrim, quinolones, and tetracyclines.  Macrobid noted as only effective against few of the 8 identified and noted not  macrobid not effective against pyelonephritis. Dose adjustment for CrCl<60 and contraindicated if <30.    Office Visit with Shaina Lloyd APRN (10/28/2021)   Office Visit with Edna Guzman APRN (11/15/2021)      Review of Systems   Constitutional: Negative for fever.   Skin: Positive for pallor.   Neurological: Positive for weakness.   Psychiatric/Behavioral: Positive for confusion.        History of dementia      Objective   Vital Signs:   /56   Pulse 91   Temp 98.4 °F (36.9 °C)   Ht 162.6 cm (64\")   Wt 51.3 kg (113 lb)   BMI 19.40 kg/m²      Past Surgical History:   Procedure Laterality Date   •  SECTION     • HIP FRACTURE SURGERY Right 2019    Dr. Orta   • OTHER SURGICAL HISTORY      Joint Surgery   • SKIN CANCER EXCISION  2001    Melanoma        Physical Exam  Vitals and nursing note reviewed.   Constitutional:       General: She is sleeping. She is not in acute distress.     Appearance: She is well-developed.      Comments: Minimal verbal response. W/c. Max assist to stand and transfer. arouses for short periods of time with noxious stimuli.(physical or loud sound)    Cardiovascular:      Rate and Rhythm: Normal rate.   Pulmonary:      " Breath sounds: Normal air entry.      Comments: No visible distress  Skin:     General: Skin is warm.      Coloration: Skin is pale.      Comments: Dry oral mucous membranes and skin tenting. Erythema to perineal region    Neurological:      Mental Status: She is lethargic.      Motor: Motor function is intact.   Psychiatric:      Comments: Lethargic and difficulty answering questions, Occasional yes or no then falls asleep without answering        Result Review :        ED to Hosp-Admission (Discharged) with Daron Jaime MD; Murphy Schulte MD (09/28/2022)         ED with Samuel Saeed MD (08/19/2022)  ED to Hosp-Admission (Discharged) with Daron Jaime MD; Murphy Schulte MD (09/28/2022)  CT Abdomen Pelvis With Contrast (09/29/2022 00:29)          CT Chest With Contrast Diagnostic (08/19/2022 16:07)   IMPRESSION: copy results ct of chest below                 1. Mild cardiomegaly with moderate-sized pericardial effusion  2. Mild fusiform ectasia of ascending thoracic aorta measuring up to 3.8 cm in size.  No evidence   of aortic dissection  3. No large central pulmonary emboli  4. Left lower lobe airspace disease favored to be due to atelectasis or scarring.  Superimposed   pneumonia cannot be entirely excluded.  Follow-up CT scan of the chest could be performed in 4-6   weeks to evaluate for resolution    CT Abdomen Pelvis With Contrast (08/19/2022 16:07)  IMPRESSION: copy results below                 1. Mild cardiomegaly with moderate pericardial effusion  2. Left lower lobe airspace disease which may be due to atelectasis or scarring.  Pneumonia cannot   be entirely excluded.  Follow-up chest CT could be performed in 4-6 weeks to evaluate for   resolution   3. No acute process seen within the abdomen or pelvis.        Assessment and Plan    Diagnoses and all orders for this visit:    1. Impaired mobility and activities of daily living (Primary)    2. History of recurrent UTIs  -     POC Urinalysis  Dipstick  -     Urine Culture - Urine, Urine, Catheter In/Out    3. Incomplete emptying of bladder    4. Acquired vaginal adhesions    5. Vulvovaginitis- Aerobic vaginosis   -     Urine Culture - Urine, Urine, Catheter In/Out    6. Urinary incontinence due to immobility  -     Urine Culture - Urine, Urine, Catheter In/Out    7. Dementia, unspecified dementia severity, unspecified dementia type, unspecified whether behavioral, psychotic, or mood disturbance or anxiety (formerly [facility])    8. Nutritional problems    Sterile straight cath in and out Urine sample obtained in office. Assist of 2 staff members to transfer patient and also assist with maintaining position during procedure. Unable to place lithotomy position and musculoskeletal resistance and pain expressed with  abduction thigh/pelvis. No UTI present per st cath urine sample today. No rbc, No wbc, No bacteria per HP. Will send for culture just to confirm.       I have expressed concern that treating patient with prophylactic antibiotics may be more harmful to patient's health. I would not advise treating a positive urine culture unless certain it was obtained with cath  sterile technigue. Explained to daughter  that patient may not have had uti, urine culture indicated mixed manny and contamination on several urine cultures.Patient may have improved in general with any antibiotic if another source  of infection present such as pneumonia or colitis. ER visit in august indicated CT abdomen moderate pericardial effusion./CT chest l No significant urological abnormality noted.  CT abdomen September indicated pericardial effusion and diffuse hepatic steatosis with hepatomegaly.CT chest not performed    Difficulty assessing and visualizing urethral meatus due to clitoral and vaginal adhesions and vulvovaginitis. Even with staff holding lower extremity and other staff member providing direct light to vaginal introitus, required 1 cath to be positioned in vaginal vault the  second cath to be placed above the 1st in order to adequately insert into meatus and bladder. Urine return clear and dennise. Approximately 400 cc total. Patient did have on attends and urine present in garment. Unsure if patient having retention. Last void unable to determine. Urine was checked under hpf and no rbc, no wbc, no bacteria and no sign of infection. Patient does have visible areas of inflamed and red tissue above introitus, general clitoral region and tissue vaginal introitus. Suspect source of rbc seen in voided urine samples.  I would recommend that patient have cystoscopy under sedation in the future if wanting further evaluation. Patient would not be able to tolerate procedure in office nor would it be advised. Patient would not be candidate for urodynamic study due to  Her physical and cognitive status.       I have expressed my professional opinion that patient  Recent decline may be secondary to other factors to include inadequate fluid and po intake. Patient requires assistance from staff in order to eat and take in thickened liquids even if in the room, patient requires assistance. Patient is wearing attends garments or in bed provided with bedpan. Not being assisted to bathroom to void.  Written consult note to [facility] and written recommendations for staff assist patient to void 3 times her day.Copy of this note will be faxed to nursing facility.    I spent 50+ minutes caring for Pati on this date of service. This time includes time spent by me in the following activities:preparing for the visit, reviewing tests, obtaining and/or reviewing a separately obtained history, performing a medically appropriate examination and/or evaluation , counseling and educating the patient/family/caregiver, ordering medications, tests, or procedures, referring and communicating with other health care professionals , documenting information in the medical record, independently  interpreting results and communicating that information with the patient/family/caregiver, care coordination and time for procedure in office to obtain st cath urine extended time  Follow Up   Return for recommend follow up with urologist who may perform cystoscopy under sedation. .  Patient was given instructions and counseling regarding her condition or for health maintenance advice. Please see specific information pulled into the AVS if appropriate.     Roxanna Marshall, APRN

## 2022-10-27 LAB — BACTERIA SPEC AEROBE CULT: NO GROWTH

## 2022-11-18 ENCOUNTER — TELEPHONE (OUTPATIENT)
Dept: FAMILY MEDICINE CLINIC | Facility: CLINIC | Age: 87
End: 2022-11-18

## 2022-11-18 NOTE — TELEPHONE ENCOUNTER
Caller: [facility]    Relationship to patient: Home [facility]    Best call back number: [phone]    Patient is needing: HOME [facility]ALTH CALLED IN STATING THEY SPOKE WITH THE PATIENT'S  A FEW WEEKS AGO AND WAS UNABLE TO GET AN UPDATE ON THIS PATIENT. HOME [facility] STATES THEY CALLED THE NURSING HOME AND WAS TOLD THE PATIENT HAS NOT BEEN IN THEIR CARE FOR OVER A WEEK NOW. HOME [facility] STATES THEY WERE SEEING THE PATIENT AND WOULD LIKE TO SEE WHERE SHE IS AND IF THEY ARE NEEDED.     [facility] IS ASKING FOR A CALL BACK WITH AN UPDATE ON THE PATIENT.